# Patient Record
Sex: FEMALE | Race: WHITE | NOT HISPANIC OR LATINO | Employment: FULL TIME | URBAN - METROPOLITAN AREA
[De-identification: names, ages, dates, MRNs, and addresses within clinical notes are randomized per-mention and may not be internally consistent; named-entity substitution may affect disease eponyms.]

---

## 2017-07-05 RX ORDER — NORETHINDRONE ACETATE AND ETHINYL ESTRADIOL 1MG-20(21)
1 KIT ORAL DAILY
Qty: 28 TABLET | Refills: 0 | Status: SHIPPED | OUTPATIENT
Start: 2017-07-05 | End: 2017-08-07 | Stop reason: SDUPTHER

## 2017-08-08 RX ORDER — NORETHINDRONE ACETATE AND ETHINYL ESTRADIOL 1MG-20(21)
1 KIT ORAL DAILY
Qty: 28 TABLET | Refills: 0 | Status: SHIPPED | OUTPATIENT
Start: 2017-08-08 | End: 2017-08-09 | Stop reason: SDUPTHER

## 2017-08-09 ENCOUNTER — OFFICE VISIT (OUTPATIENT)
Dept: OBSTETRICS AND GYNECOLOGY | Facility: CLINIC | Age: 27
End: 2017-08-09
Payer: COMMERCIAL

## 2017-08-09 VITALS — BODY MASS INDEX: 25.32 KG/M2 | HEIGHT: 61 IN | WEIGHT: 134.13 LBS

## 2017-08-09 DIAGNOSIS — Z01.419 ENCOUNTER FOR GYNECOLOGICAL EXAMINATION: Primary | ICD-10-CM

## 2017-08-09 DIAGNOSIS — Z30.41 ENCOUNTER FOR SURVEILLANCE OF CONTRACEPTIVE PILLS: ICD-10-CM

## 2017-08-09 DIAGNOSIS — Z12.4 SCREENING FOR MALIGNANT NEOPLASM OF THE CERVIX: ICD-10-CM

## 2017-08-09 PROCEDURE — 99395 PREV VISIT EST AGE 18-39: CPT | Mod: S$GLB,,, | Performed by: NURSE PRACTITIONER

## 2017-08-09 PROCEDURE — 99999 PR PBB SHADOW E&M-EST. PATIENT-LVL III: CPT | Mod: PBBFAC,,, | Performed by: NURSE PRACTITIONER

## 2017-08-09 RX ORDER — ALPRAZOLAM 0.25 MG/1
0.25 TABLET ORAL DAILY
Refills: 0 | COMMUNITY
Start: 2017-05-18 | End: 2019-06-08

## 2017-08-09 RX ORDER — LISDEXAMFETAMINE DIMESYLATE 30 MG/1
30 CAPSULE ORAL EVERY MORNING
Refills: 0 | COMMUNITY
Start: 2017-07-13 | End: 2018-08-28

## 2017-08-09 RX ORDER — VENLAFAXINE HYDROCHLORIDE 75 MG/1
CAPSULE, EXTENDED RELEASE ORAL
Refills: 1 | COMMUNITY
Start: 2017-07-21 | End: 2018-08-28

## 2017-08-09 RX ORDER — NORETHINDRONE ACETATE AND ETHINYL ESTRADIOL 1MG-20(21)
1 KIT ORAL DAILY
Qty: 28 TABLET | Refills: 11 | Status: SHIPPED | OUTPATIENT
Start: 2017-08-09 | End: 2017-08-09 | Stop reason: CLARIF

## 2017-08-09 RX ORDER — NORETHINDRONE ACETATE AND ETHINYL ESTRADIOL, AND FERROUS FUMARATE 1MG-20(24)
1 KIT ORAL DAILY
Qty: 84 CAPSULE | Refills: 3 | Status: SHIPPED | OUTPATIENT
Start: 2017-08-09 | End: 2018-08-09

## 2017-08-09 NOTE — PROGRESS NOTES
Chief Complaint: Well Woman Exam     HPI:      (Shayna pt)  Last Pap: 2016 (normal)    Tamara Posey is a 27 y.o.  who presents today for well woman exam.  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  She is engaged to be , and will likely have wedding at end of 2018.  She will be without health insurance as of 17 until her new insurance kicks in in 2-3 months - she'd like to know if there's a cheaper alternative or if she can get a few packs at once.  LMP: Patient's last menstrual period was 2017 (exact date).    No issues, problems, or complaints. Specifically, patient denies abnormal vaginal bleeding, discharge, pelvic pain, urinary problems, or changes in appetite.   Ms. Posey is currently sexually active with a single male partner.   She declines STD screening today.      Past Medical History:   Diagnosis Date    ADD (attention deficit disorder)     Anxiety     History of panic attacks      Past Surgical History:   Procedure Laterality Date    LASIK Bilateral 2012    TONSILLECTOMY  2014    WISDOM TOOTH EXTRACTION       Social History     Social History    Marital status: Single     Spouse name: N/A    Number of children: N/A    Years of education: N/A     Occupational History    Not on file.     Social History Main Topics    Smoking status: Never Smoker    Smokeless tobacco: Never Used    Alcohol use Yes      Comment: Occational    Drug use: No    Sexual activity: Yes     Partners: Male     Birth control/ protection: OCP      Comment: Engaged:     Other Topics Concern    Not on file     Social History Narrative    No narrative on file     Family History   Problem Relation Age of Onset    Colon cancer Paternal Grandfather     Cancer Paternal Grandfather     Breast cancer Neg Hx     Ovarian cancer Neg Hx      OB History      Para Term  AB Living    0 0 0 0 0 0    SAB TAB Ectopic Multiple  "Live Births    0 0 0 0          Obstetric Comments    Menarche ~ 15          ROS:     GENERAL: Denies unintentional weight gain or weight loss. Feeling well overall.   SKIN: Denies rash or lesions.   HEENT: Denies headaches, or vision changes.   CARDIOVASCULAR: Denies palpitations or chest pain.   RESPIRATORY: Denies shortness of breath or dyspnea on exertion.  BREASTS: Denies pain, lumps, or nipple discharge.   ABDOMEN: Denies abdominal pain, constipation, diarrhea, nausea, vomiting, change in appetite.  URINARY: Denies frequency, dysuria, hematuria.  NEUROLOGIC: Denies syncope or weakness.   PSYCHIATRIC: Denies depression, anxiety or mood swings.      Physical Exam:      PHYSICAL EXAM:  Ht 5' 1" (1.549 m)   Wt 60.9 kg (134 lb 2.4 oz)   LMP 07/18/2017 (Exact Date)   BMI 25.35 kg/m²   Body mass index is 25.35 kg/m².     APPEARANCE: Well nourished, well developed, in no acute distress.  PSYCH: Appropriate mood and affect.  SKIN: No acne or hirsutism.  NECK: Neck symmetric without masses or thyromegaly  NODES: No inguinal, axillary, or supraclavicular lymph node enlargement  CHEST: Normal respiratory effort.  ABDOMEN: Soft.  No tenderness or masses.    BREASTS: Symmetrical, no skin changes or visible lesions.  No palpable masses or nipple discharge bilaterally.  PELVIC: Normal external genitalia without lesions.  Normal hair distribution.  Adequate perineal body, normal urethral meatus.  Vagina moist and well rugated without lesions or discharge.  Cervix pink, without lesions, discharge or tenderness.  No significant cystocele or rectocele.  Bimanual exam shows uterus to be normal size, regular, mobile and nontender.  Adnexa without masses or tenderness.    EXTREMITIES: No edema.    Assessment/Plan:     Encounter for gynecological examination    Screening for malignant neoplasm of the cervix  -     Cancel: Liquid-based pap smear, screening    Encounter for surveillance of contraceptive pills  -     Discontinue: " norethindrone-ethinyl estradiol (BLISOVI FE 1/20, 28,) 1 mg-20 mcg (21)/75 mg (7) per tablet; Take 1 tablet by mouth once daily. Pt needs appointment for further refills.  Dispense: 28 tablet; Refill: 11  -     TAYTULLA 1 mg-20 mcg (24)/75 mg (4) Cap; Take 1 capsule by mouth once daily. (Dispense 3-packs at a time please)  Dispense: 84 capsule; Refill: 3    * Next pap due 2019      Counseling:     Patient was counseled today on current ASCCP pap guidelines, the recommendation for yearly pelvic exams, healthy diet and exercise routines, annual mammograms and breast self awareness. She is to see her PCP for other health maintenance.     Return in about 1 year (around 8/9/2018) for Annual.

## 2018-08-28 ENCOUNTER — OFFICE VISIT (OUTPATIENT)
Dept: OBSTETRICS AND GYNECOLOGY | Facility: CLINIC | Age: 28
End: 2018-08-28
Payer: COMMERCIAL

## 2018-08-28 VITALS
BODY MASS INDEX: 26.81 KG/M2 | SYSTOLIC BLOOD PRESSURE: 110 MMHG | DIASTOLIC BLOOD PRESSURE: 68 MMHG | HEIGHT: 61 IN | WEIGHT: 142 LBS

## 2018-08-28 DIAGNOSIS — Z01.419 ENCOUNTER FOR GYNECOLOGICAL EXAMINATION: Primary | ICD-10-CM

## 2018-08-28 DIAGNOSIS — Z30.41 ENCOUNTER FOR SURVEILLANCE OF CONTRACEPTIVE PILLS: ICD-10-CM

## 2018-08-28 PROCEDURE — 99395 PREV VISIT EST AGE 18-39: CPT | Mod: S$GLB,,, | Performed by: NURSE PRACTITIONER

## 2018-08-28 PROCEDURE — 99999 PR PBB SHADOW E&M-EST. PATIENT-LVL III: CPT | Mod: PBBFAC,,, | Performed by: NURSE PRACTITIONER

## 2018-08-28 NOTE — PROGRESS NOTES
Chief Complaint: Well Woman Exam     (Dr. montez patient)    Last Pap:  2016 Normal,  HPV n/a    Last Mammo:  n/a    HPI:      Tamara Posey is a 28 y.o.  who presents today for well woman exam.  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  LMP: Patient's last menstrual period was 2018.  Happy with OCP's.  No issues, problems, or complaints. Specifically, patient denies abnormal vaginal bleeding, discharge, pelvic pain, urinary problems, or changes in appetite.   Ms. Posey is currently sexually active with a single male partner.  She declines STD screening today.      Past Medical History:   Diagnosis Date    ADD (attention deficit disorder)     Anxiety     Foot fracture, left     History of panic attacks      Past Surgical History:   Procedure Laterality Date    LASIK Bilateral 2012    TONSILLECTOMY  2014    WISDOM TOOTH EXTRACTION       Social History     Socioeconomic History    Marital status: Single     Spouse name: Not on file    Number of children: Not on file    Years of education: Not on file    Highest education level: Not on file   Social Needs    Financial resource strain: Not on file    Food insecurity - worry: Not on file    Food insecurity - inability: Not on file    Transportation needs - medical: Not on file    Transportation needs - non-medical: Not on file   Occupational History    Not on file   Tobacco Use    Smoking status: Never Smoker    Smokeless tobacco: Never Used   Substance and Sexual Activity    Alcohol use: Yes     Comment: Occational    Drug use: No    Sexual activity: Yes     Partners: Male     Birth control/protection: OCP     Comment: Engaged:   Other Topics Concern    Not on file   Social History Narrative    Not on file     Family History   Problem Relation Age of Onset    Colon cancer Paternal Grandfather     Cancer Paternal Grandfather     Breast cancer Neg Hx     Ovarian cancer Neg Hx   "    OB History      Para Term  AB Living    0 0 0 0 0 0    SAB TAB Ectopic Multiple Live Births    0 0 0 0          Obstetric Comments    Menarche ~ 15          ROS:     GENERAL: Denies unintentional weight gain or weight loss. Feeling well overall.   SKIN: Denies rash or lesions.   HEENT: Denies headaches, or vision changes.   CARDIOVASCULAR: Denies palpitations or chest pain.   RESPIRATORY: Denies shortness of breath or dyspnea on exertion.  BREASTS: The patient performs breast self-examination and denies pain, lumps, or nipple discharge.   ABDOMEN: Denies abdominal pain, constipation, diarrhea, nausea, vomiting, change in appetite.  URINARY: Denies frequency, dysuria, hematuria.  NEUROLOGIC: Denies syncope or weakness.   PSYCHIATRIC: Denies depression, anxiety or mood swings.  VULVAR: No pain, no lesions and no itching.  VAGINAL: No relaxation, no itching, no abnormal bleeding and no lesions.    Physical Exam:     PHYSICAL EXAM:  /68   Ht 5' 1" (1.549 m)   Wt 64.4 kg (141 lb 15.6 oz)   LMP 2018   BMI 26.83 kg/m²   Body mass index is 26.83 kg/m².     APPEARANCE: Well nourished, well developed, in no acute distress.  PSYCH: Appropriate mood and affect.  SKIN: No acne or hirsutism.  NECK: Neck symmetric without masses or thyromegaly  NODES: No inguinal, axillary, or supraclavicular lymph node enlargement  CHEST: Normal respiratory effort.  ABDOMEN: Soft.  No tenderness or masses.    BREASTS: Symmetrical, no skin changes or visible lesions.  No palpable masses or nipple discharge bilaterally.  PELVIC: External genitalia: normal external genitalia, no erythema, no discharge  and urethra: normal appearing urethra with no masses, tenderness or lesions.  Normal hair distribution.  Vagina normal appearing vagina with normal color and discharge, no lesions.  Cervix normal appearing cervix without discharge or lesions, cervical motion tenderness absent.  No significant cystocele or rectocele.  " Bimanual exam shows uterus to be uterus is normal size, shape, consistency and nontender, mobile.  Adnexa normal adnexa and no mass, fullness, tenderness.    EXTREMITIES: No edema.       Assessment/Plan:     Encounter for gynecological examination    Encounter for surveillance of contraceptive pills  - Blisovi refilled (28 tablet, RF#11)    Counseling:     Patient was counseled today on current ASCCP pap guidelines, the recommendation for yearly pelvic exams, healthy diet and exercise routines, annual mammograms starting at age 40, and breast self awareness. She is to see her PCP for other health maintenance.     Use of the [facility] Patient Portal discussed and encouraged during today's visit.     Follow-up in about 1 year (around 8/28/2019) for Annual.

## 2019-06-08 ENCOUNTER — OFFICE VISIT (OUTPATIENT)
Dept: URGENT CARE | Facility: CLINIC | Age: 29
End: 2019-06-08
Payer: COMMERCIAL

## 2019-06-08 VITALS
WEIGHT: 142 LBS | TEMPERATURE: 98 F | HEART RATE: 72 BPM | DIASTOLIC BLOOD PRESSURE: 81 MMHG | BODY MASS INDEX: 26.81 KG/M2 | OXYGEN SATURATION: 99 % | SYSTOLIC BLOOD PRESSURE: 118 MMHG | RESPIRATION RATE: 18 BRPM | HEIGHT: 61 IN

## 2019-06-08 DIAGNOSIS — H01.119 CONTACT DERMATITIS OF EYELID, UNSPECIFIED LATERALITY: Primary | ICD-10-CM

## 2019-06-08 PROCEDURE — 3008F BODY MASS INDEX DOCD: CPT | Mod: CPTII,S$GLB,,

## 2019-06-08 PROCEDURE — 99214 OFFICE O/P EST MOD 30 MIN: CPT | Mod: S$GLB,,,

## 2019-06-08 PROCEDURE — 3008F PR BODY MASS INDEX (BMI) DOCUMENTED: ICD-10-PCS | Mod: CPTII,S$GLB,,

## 2019-06-08 PROCEDURE — 99214 PR OFFICE/OUTPT VISIT, EST, LEVL IV, 30-39 MIN: ICD-10-PCS | Mod: S$GLB,,,

## 2019-06-08 RX ORDER — LOTEPREDNOL ETABONATE 2 MG/ML
1 SUSPENSION/ DROPS OPHTHALMIC 2 TIMES DAILY
Qty: 1 BOTTLE | Refills: 0 | Status: SHIPPED | OUTPATIENT
Start: 2019-06-08 | End: 2019-06-18

## 2019-06-08 RX ORDER — CETIRIZINE HYDROCHLORIDE 10 MG/1
10 TABLET ORAL DAILY
COMMUNITY

## 2019-06-08 RX ORDER — VENLAFAXINE HYDROCHLORIDE 75 MG/1
CAPSULE, EXTENDED RELEASE ORAL
COMMUNITY
Start: 2018-12-18 | End: 2019-09-23 | Stop reason: SDUPTHER

## 2019-06-08 NOTE — PROGRESS NOTES
"Subjective:       Patient ID: Tamara Posey is a 29 y.o. female.    Vitals:  height is 5' 1" (1.549 m) and weight is 64.4 kg (142 lb). Her oral temperature is 98.1 °F (36.7 °C). Her blood pressure is 118/81 and her pulse is 72. Her respiration is 18 and oxygen saturation is 99%.     Chief Complaint: Eye Problem    This is a 29 y.o. female who presents today with a chief complaint of bilateral eye redness, itchy, dryness and feels gritty for 4 days. Using [facility] pink Eye relief.   Has allergic response symptoms; redness; itchy; mild edema;    Eye Problem    Both eyes are affected.This is a new problem. The current episode started in the past 7 days. The problem occurs constantly. The problem has been gradually worsening. There was no injury mechanism. The pain is at a severity of 0/10. The patient is experiencing no pain. She does not wear contacts. Associated symptoms include an eye discharge, eye redness and itching. Pertinent negatives include no blurred vision, double vision, fever, foreign body sensation, nausea, photophobia or vomiting. She has tried eye drops for the symptoms. The treatment provided mild relief.       Constitution: Negative for chills and fever.   HENT: Negative for ear pain, congestion and sinus pain.    Neck: Negative for painful lymph nodes.   Cardiovascular: Negative for chest pain.   Eyes: Positive for eye discharge, eye itching and eye redness. Negative for eye trauma, foreign body in eye, eye pain, photophobia, vision loss, double vision, blurred vision and eyelid swelling.   Gastrointestinal: Negative for nausea and vomiting.   Genitourinary: Negative for dysuria and history of kidney stones.   Skin: Negative for rash.   Allergic/Immunologic: Positive for seasonal allergies. Negative for itching.   Neurological: Negative for dizziness, headaches and altered mental status.   Hematologic/Lymphatic: Negative for swollen lymph nodes.   Psychiatric/Behavioral: Negative for altered mental " status.       Objective:      Physical Exam   Constitutional: She is oriented to person, place, and time. She appears well-developed and well-nourished. No distress.   HENT:   Head: Normocephalic.   Right Ear: External ear normal.   Left Ear: External ear normal.   Mouth/Throat: Oropharynx is clear and moist.   Eyes: Pupils are equal, round, and reactive to light. Right eye exhibits no discharge. Left eye exhibits no discharge. No scleral icterus.   Erythema;not injected; edema; clear discharge;   Neck: Normal range of motion. Neck supple.   Cardiovascular: Normal rate, regular rhythm, normal heart sounds and intact distal pulses.   Pulmonary/Chest: Effort normal and breath sounds normal. No stridor. No respiratory distress. She has no wheezes. She has no rales.   Musculoskeletal: Normal range of motion. She exhibits no edema or deformity.   Neurological: She is alert and oriented to person, place, and time.   Skin: Skin is warm. She is not diaphoretic.   Psychiatric: She has a normal mood and affect. Her behavior is normal. Judgment and thought content normal.       Assessment:       1. Contact dermatitis of eyelid, unspecified laterality        Plan:       Patient Instructions     Seasonal Allergy  Seasonal allergy is also called hay fever. It may occur after a person is exposed to pollens released from grasses, weeds, trees and shrubs. This type of allergy occurs during the spring and summer when the pollen contacts the lining of the nose, eyes, eyelids, sinuses and throat. This causes histamine to be released from the tissues. Histamine causes itching and swelling. This may produce a watery discharge from the eyes or nose. Violent sneezing, nasal congestion, post-nasal drip, itching of the eyes, nose, throat and mouth, scratchy throat, and dry cough may also occur.  Home care  Seasonal allergy cannot be cured, but symptoms can be reduced by these measures:  · Avoid or reduce exposure to the allergen as much as  you can:    ¨ Stay indoors on windy days of pollen season.   ¨ Keep windows and doors closed. Use air conditioning instead in your home and car. This filters the air.  ¨ Change air conditioner filters often.  ¨ Take a shower, wash your hair, and change clothes after being outdoors.  ¨ Put on a NIOSH-rated 95 filter mask when working outdoors. Before going outside, take your allergy medicine as advised by your healthcare provider.  · Decongestant pills and sprays reduce tissue swelling and watery discharge. Overuse of nasal decongestant sprays may make symptoms worse. Do not use these more often than recommended. Sometimes you can experience a rebound effect (symptoms worsen), when stopping them. Talk to your healthcare provider or pharmacist about these medicines before taking them, especially if you have high blood pressure or heart problems.   · Antihistamines block the release of histamine during the allergic response. They work better when taken before symptoms develop. Unless a prescription antihistamine was prescribed, you can take over-the-counter antihistamines that do not cause drowsiness.  Ask your pharmacist for suggestions.  · Steroid nasal sprays or oral steroids may also be prescribed for more severe symptoms. These help to reduce the local inflammation that can add to the allergic response.  · If you have asthma, pollen season may make your asthma symptoms worse. It is important that you use your asthma medicines as directed during this time to prevent or treat attacks. Some persons with asthma have asthma symptoms that get worse when they take antihistamines. This is due to the drying effect on the lungs. If you notice this, stop the antihistamines, drink extra fluids and notify your doctor.  · If you have sinus congestion or drainage, a saline nasal rinse may give relief. A saline nasal rinse lessens the swelling and clears excess mucus. This allows sinuses to drain. Prepackaged kits are sold at  most drug stores. These contain pre-mixed salt packets and an irrigation device.  Follow-up care  Follow up with your healthcare provider or as directed. If you have been referred to a specialist, make an appointment promptly.  When to seek medical advice  Call your healthcare provider for any of the following:  · Facial, ear or sinus pain; colored drainage from the nose  · Headaches  · You have asthma and your asthma symptoms do not respond to the usual doses of your medicine  · Cough with colored sputum (mucus)  · Fever of 100.4°F (38°C) or higher, or as directed by the healthcare provider  Call 911 if any of these occur:  · Trouble breathing or swallowing, wheezing  · Hoarse voice, trouble speaking, or drooling  · Confusion  · Very drowsy or trouble awakening  · Fainting or loss of consciousness  · Rapid heart rate, or weak pulse  · Low blood pressure  · Feeling of doom  · Nausea, vomiting, abdominal pain, diarrhea  · Vomiting blood, or large amounts of blood in stool  · Seizure  · Cold, moist, or pale (blue in color) skin  Date Last Reviewed: 5/1/2017  © 0320-9393 [employer]. [address] [place] [id number]. All rights reserved. This information is not intended as a substitute for professional medical care. Always follow your healthcare professional's instructions.            Contact dermatitis of eyelid, unspecified laterality  -     loteprednol (ALREX) 0.2 % DrpS; Place 1 drop into both eyes 2 (two) times daily. for 10 days  Dispense: 1 Bottle; Refill: 0

## 2019-06-08 NOTE — PATIENT INSTRUCTIONS
Seasonal Allergy  Seasonal allergy is also called hay fever. It may occur after a person is exposed to pollens released from grasses, weeds, trees and shrubs. This type of allergy occurs during the spring and summer when the pollen contacts the lining of the nose, eyes, eyelids, sinuses and throat. This causes histamine to be released from the tissues. Histamine causes itching and swelling. This may produce a watery discharge from the eyes or nose. Violent sneezing, nasal congestion, post-nasal drip, itching of the eyes, nose, throat and mouth, scratchy throat, and dry cough may also occur.  Home care  Seasonal allergy cannot be cured, but symptoms can be reduced by these measures:  · Avoid or reduce exposure to the allergen as much as you can:    ¨ Stay indoors on windy days of pollen season.   ¨ Keep windows and doors closed. Use air conditioning instead in your home and car. This filters the air.  ¨ Change air conditioner filters often.  ¨ Take a shower, wash your hair, and change clothes after being outdoors.  ¨ Put on a NIOSH-rated 95 filter mask when working outdoors. Before going outside, take your allergy medicine as advised by your healthcare provider.  · Decongestant pills and sprays reduce tissue swelling and watery discharge. Overuse of nasal decongestant sprays may make symptoms worse. Do not use these more often than recommended. Sometimes you can experience a rebound effect (symptoms worsen), when stopping them. Talk to your healthcare provider or pharmacist about these medicines before taking them, especially if you have high blood pressure or heart problems.   · Antihistamines block the release of histamine during the allergic response. They work better when taken before symptoms develop. Unless a prescription antihistamine was prescribed, you can take over-the-counter antihistamines that do not cause drowsiness.  Ask your pharmacist for suggestions.  · Steroid nasal sprays or oral steroids may  also be prescribed for more severe symptoms. These help to reduce the local inflammation that can add to the allergic response.  · If you have asthma, pollen season may make your asthma symptoms worse. It is important that you use your asthma medicines as directed during this time to prevent or treat attacks. Some persons with asthma have asthma symptoms that get worse when they take antihistamines. This is due to the drying effect on the lungs. If you notice this, stop the antihistamines, drink extra fluids and notify your doctor.  · If you have sinus congestion or drainage, a saline nasal rinse may give relief. A saline nasal rinse lessens the swelling and clears excess mucus. This allows sinuses to drain. Prepackaged kits are sold at most drug stores. These contain pre-mixed salt packets and an irrigation device.  Follow-up care  Follow up with your healthcare provider or as directed. If you have been referred to a specialist, make an appointment promptly.  When to seek medical advice  Call your healthcare provider for any of the following:  · Facial, ear or sinus pain; colored drainage from the nose  · Headaches  · You have asthma and your asthma symptoms do not respond to the usual doses of your medicine  · Cough with colored sputum (mucus)  · Fever of 100.4°F (38°C) or higher, or as directed by the healthcare provider  Call 911 if any of these occur:  · Trouble breathing or swallowing, wheezing  · Hoarse voice, trouble speaking, or drooling  · Confusion  · Very drowsy or trouble awakening  · Fainting or loss of consciousness  · Rapid heart rate, or weak pulse  · Low blood pressure  · Feeling of doom  · Nausea, vomiting, abdominal pain, diarrhea  · Vomiting blood, or large amounts of blood in stool  · Seizure  · Cold, moist, or pale (blue in color) skin  Date Last Reviewed: 5/1/2017  © 1490-9050 [employer] [address] [place] [address], [place] [address]. All rights reserved. This information is not  intended as a substitute for professional medical care. Always follow your healthcare professional's instructions.

## 2019-06-11 ENCOUNTER — TELEPHONE (OUTPATIENT)
Dept: URGENT CARE | Facility: CLINIC | Age: 29
End: 2019-06-11

## 2019-06-11 NOTE — TELEPHONE ENCOUNTER
I have attempted to contact this patient by phone with a courtesy call from visit on 06/08/2019. Left msg for pt to call back

## 2019-07-19 DIAGNOSIS — Z30.41 ENCOUNTER FOR SURVEILLANCE OF CONTRACEPTIVE PILLS: ICD-10-CM

## 2019-07-23 RX ORDER — NORETHINDRONE ACETATE AND ETHINYL ESTRADIOL 1MG-20(24)
KIT ORAL
Qty: 84 TABLET | Refills: 3 | OUTPATIENT
Start: 2019-07-23

## 2019-09-16 ENCOUNTER — TELEPHONE (OUTPATIENT)
Dept: OBSTETRICS AND GYNECOLOGY | Facility: CLINIC | Age: 29
End: 2019-09-16

## 2019-09-16 NOTE — TELEPHONE ENCOUNTER
Spoke with patient and notified that it is ok for her to have her annual appointment while she is on her cycle.     Pt reschedule to a later date because she would feel more comfortable.

## 2019-09-16 NOTE — TELEPHONE ENCOUNTER
----- Message from Sirena Montesinos sent at 9/16/2019 11:27 AM CDT -----  Contact: self  Pt is calling to see if she need to reschedule her upcoming appointment due to her being on her cycle. Pt stated her cycle is not heavy at the moment and she is in the middle of her cycle. Pt can be reached at [phone].

## 2019-09-23 ENCOUNTER — OFFICE VISIT (OUTPATIENT)
Dept: OBSTETRICS AND GYNECOLOGY | Facility: CLINIC | Age: 29
End: 2019-09-23
Payer: COMMERCIAL

## 2019-09-23 VITALS
DIASTOLIC BLOOD PRESSURE: 82 MMHG | WEIGHT: 132.25 LBS | HEIGHT: 61 IN | SYSTOLIC BLOOD PRESSURE: 112 MMHG | BODY MASS INDEX: 24.97 KG/M2

## 2019-09-23 DIAGNOSIS — Z12.4 CERVICAL CANCER SCREENING: Primary | ICD-10-CM

## 2019-09-23 PROCEDURE — 99395 PREV VISIT EST AGE 18-39: CPT | Mod: S$GLB,,, | Performed by: OBSTETRICS & GYNECOLOGY

## 2019-09-23 PROCEDURE — 99395 PR PREVENTIVE VISIT,EST,18-39: ICD-10-PCS | Mod: S$GLB,,, | Performed by: OBSTETRICS & GYNECOLOGY

## 2019-09-23 PROCEDURE — 88175 CYTOPATH C/V AUTO FLUID REDO: CPT

## 2019-09-23 RX ORDER — VENLAFAXINE HYDROCHLORIDE 75 MG/1
CAPSULE, EXTENDED RELEASE ORAL
Qty: 30 CAPSULE | Refills: 11 | Status: SHIPPED | OUTPATIENT
Start: 2019-09-23 | End: 2019-09-24 | Stop reason: SDUPTHER

## 2019-09-23 NOTE — PROGRESS NOTES
"CC: 30 yo G0 female, here for AE     HPI: Tamara is overall well today. Doing well. Last pap was in 2016, NILM. Engaged. She works for "[employer]. Severo works in [employer] business. Regular cycles on COCS. Happy with pills. Gardisil up to date.     Past Medical History:   Diagnosis Date    ADD (attention deficit disorder)     Anxiety     Foot fracture, left     History of panic attacks        Past Surgical History:   Procedure Laterality Date    LASIK Bilateral     TONSILLECTOMY      WISDOM TOOTH EXTRACTION         OB History        0    Para   0    Term   0       0    AB   0    Living   0       SAB   0    TAB   0    Ectopic   0    Multiple   0    Live Births               Obstetric Comments   Menarche ~ 15             Current Outpatient Medications on File Prior to Visit   Medication Sig Dispense Refill    cetirizine (ZYRTEC) 10 MG tablet Take 10 mg by mouth once daily.      norethindrone-e.estradiol-iron (BLISOVI 24 FE ORAL) Blisovi 24 Fe      venlafaxine (EFFEXOR-XR) 75 MG 24 hr capsule venlafaxine       No current facility-administered medications on file prior to visit.          ROS:  GENERAL: Denies weight gain or weight loss. Feeling well overall.   SKIN: Denies rash or lesions.   HEAD: Denies head injury or headache.   CHEST: Denies chest pain or shortness of breath.   CARDIOVASCULAR: Denies palpitations or left sided chest pain.   ABDOMEN: No abdominal pain  URINARY: No frequency, dysuria, hematuria or burning on urination.  REPRODUCTIVE: See HPI.   HEMATOLOGIC: No easy bruisability or excessive bleeding.   MUSCULOSKELETAL: Denies joint pain or swelling.     Physical Exam:   /82   Ht 5' 1" (1.549 m)   Wt 60 kg (132 lb 4.4 oz)   LMP 2019 (Approximate)   BMI 24.99 kg/m²   General: No distress, well appearing  HEENT: normocephalic, atraumatic   Heart: Regular rate  Lungs: No increased work of breathing  Breasts: Symmetrical, no masses, " discharge or skin retractions.  Abdomen: soft, nontender, no masses  MS: lower extremeties symmetrical, no edema  Pelvic Exam:     GENITALIA: Normal external genitalia, no lesions   URETHRA: normal appearing   Bladder non tender   VAGINA: normal vaginal mucosa, no lesions   CERVIX: no lesions visible, no CMT    UTERUS: small, mobile, non tender   ADNEXA: no adnexal masses, tenderness or fullness  PSYCH: Normal affect, mood appropriate       ASSESSMENT/PLAN: 30 yo G0 here for AE.     1. Pap today. If normal, can repeat in 3 years.   2. Refilled COCs  3. Refilled effexor  4. Gardisil up to date  5. Flu shot through work    RTO in 1 year for [employer]    Saloni Poe MD  Obstetrics and Gynecology  [facility]

## 2019-09-24 RX ORDER — VENLAFAXINE HYDROCHLORIDE 75 MG/1
75 CAPSULE, EXTENDED RELEASE ORAL DAILY
Qty: 30 CAPSULE | Refills: 11 | Status: SHIPPED | OUTPATIENT
Start: 2019-09-24 | End: 2019-12-03 | Stop reason: SDUPTHER

## 2019-12-02 ENCOUNTER — OFFICE VISIT (OUTPATIENT)
Dept: INTERNAL MEDICINE | Facility: CLINIC | Age: 29
End: 2019-12-02
Payer: COMMERCIAL

## 2019-12-02 ENCOUNTER — CLINICAL SUPPORT (OUTPATIENT)
Dept: INTERNAL MEDICINE | Facility: CLINIC | Age: 29
End: 2019-12-02
Payer: COMMERCIAL

## 2019-12-02 VITALS
WEIGHT: 136.69 LBS | BODY MASS INDEX: 25.81 KG/M2 | TEMPERATURE: 99 F | HEIGHT: 61 IN | SYSTOLIC BLOOD PRESSURE: 110 MMHG | DIASTOLIC BLOOD PRESSURE: 70 MMHG | OXYGEN SATURATION: 100 % | HEART RATE: 90 BPM

## 2019-12-02 DIAGNOSIS — Z13.220 SCREENING FOR LIPID DISORDERS: ICD-10-CM

## 2019-12-02 DIAGNOSIS — Z23 NEED FOR DIPHTHERIA-TETANUS-PERTUSSIS (TDAP) VACCINE: ICD-10-CM

## 2019-12-02 DIAGNOSIS — Z00.00 ANNUAL PHYSICAL EXAM: ICD-10-CM

## 2019-12-02 DIAGNOSIS — F41.9 CHRONIC ANXIETY: ICD-10-CM

## 2019-12-02 DIAGNOSIS — Z13.1 SCREENING FOR DIABETES MELLITUS: ICD-10-CM

## 2019-12-02 DIAGNOSIS — Z00.00 ANNUAL PHYSICAL EXAM: Primary | ICD-10-CM

## 2019-12-02 PROBLEM — F90.9 ATTENTION-DEFICIT HYPERACTIVITY DISORDER, UNSPECIFIED TYPE: Status: ACTIVE | Noted: 2018-12-18

## 2019-12-02 LAB
ALBUMIN SERPL BCP-MCNC: 4 G/DL (ref 3.5–5.2)
ALP SERPL-CCNC: 64 U/L (ref 55–135)
ALT SERPL W/O P-5'-P-CCNC: 13 U/L (ref 10–44)
ANION GAP SERPL CALC-SCNC: 9 MMOL/L (ref 8–16)
AST SERPL-CCNC: 17 U/L (ref 10–40)
BASOPHILS # BLD AUTO: 0.08 K/UL (ref 0–0.2)
BASOPHILS NFR BLD: 1 % (ref 0–1.9)
BILIRUB SERPL-MCNC: 0.4 MG/DL (ref 0.1–1)
BUN SERPL-MCNC: 11 MG/DL (ref 6–20)
CALCIUM SERPL-MCNC: 9.5 MG/DL (ref 8.7–10.5)
CHLORIDE SERPL-SCNC: 103 MMOL/L (ref 95–110)
CHOLEST SERPL-MCNC: 195 MG/DL (ref 120–199)
CHOLEST/HDLC SERPL: 3.3 {RATIO} (ref 2–5)
CO2 SERPL-SCNC: 26 MMOL/L (ref 23–29)
CREAT SERPL-MCNC: 0.7 MG/DL (ref 0.5–1.4)
DIFFERENTIAL METHOD: ABNORMAL
EOSINOPHIL # BLD AUTO: 0.5 K/UL (ref 0–0.5)
EOSINOPHIL NFR BLD: 6.3 % (ref 0–8)
ERYTHROCYTE [DISTWIDTH] IN BLOOD BY AUTOMATED COUNT: 12.4 % (ref 11.5–14.5)
EST. GFR  (AFRICAN AMERICAN): >60 ML/MIN/1.73 M^2
EST. GFR  (NON AFRICAN AMERICAN): >60 ML/MIN/1.73 M^2
GLUCOSE SERPL-MCNC: 91 MG/DL (ref 70–110)
HCT VFR BLD AUTO: 44.5 % (ref 37–48.5)
HDLC SERPL-MCNC: 60 MG/DL (ref 40–75)
HDLC SERPL: 30.8 % (ref 20–50)
HGB BLD-MCNC: 14 G/DL (ref 12–16)
IMM GRANULOCYTES # BLD AUTO: 0.02 K/UL (ref 0–0.04)
IMM GRANULOCYTES NFR BLD AUTO: 0.3 % (ref 0–0.5)
LDLC SERPL CALC-MCNC: 119.2 MG/DL (ref 63–159)
LYMPHOCYTES # BLD AUTO: 2.2 K/UL (ref 1–4.8)
LYMPHOCYTES NFR BLD: 27.6 % (ref 18–48)
MCH RBC QN AUTO: 30.9 PG (ref 27–31)
MCHC RBC AUTO-ENTMCNC: 31.5 G/DL (ref 32–36)
MCV RBC AUTO: 98 FL (ref 82–98)
MONOCYTES # BLD AUTO: 0.7 K/UL (ref 0.3–1)
MONOCYTES NFR BLD: 9.1 % (ref 4–15)
NEUTROPHILS # BLD AUTO: 4.5 K/UL (ref 1.8–7.7)
NEUTROPHILS NFR BLD: 55.7 % (ref 38–73)
NONHDLC SERPL-MCNC: 135 MG/DL
NRBC BLD-RTO: 0 /100 WBC
PLATELET # BLD AUTO: 264 K/UL (ref 150–350)
PMV BLD AUTO: 11.1 FL (ref 9.2–12.9)
POTASSIUM SERPL-SCNC: 4.8 MMOL/L (ref 3.5–5.1)
PROT SERPL-MCNC: 7 G/DL (ref 6–8.4)
RBC # BLD AUTO: 4.53 M/UL (ref 4–5.4)
SODIUM SERPL-SCNC: 138 MMOL/L (ref 136–145)
TRIGL SERPL-MCNC: 79 MG/DL (ref 30–150)
WBC # BLD AUTO: 8 K/UL (ref 3.9–12.7)

## 2019-12-02 PROCEDURE — 99999 PR PBB SHADOW E&M-EST. PATIENT-LVL IV: CPT | Mod: PBBFAC,,, | Performed by: FAMILY MEDICINE

## 2019-12-02 PROCEDURE — 90471 TDAP VACCINE GREATER THAN OR EQUAL TO 7YO IM: ICD-10-PCS | Mod: S$GLB,,, | Performed by: FAMILY MEDICINE

## 2019-12-02 PROCEDURE — 90471 IMMUNIZATION ADMIN: CPT | Mod: S$GLB,,, | Performed by: FAMILY MEDICINE

## 2019-12-02 PROCEDURE — 80061 LIPID PANEL: CPT

## 2019-12-02 PROCEDURE — 90715 TDAP VACCINE GREATER THAN OR EQUAL TO 7YO IM: ICD-10-PCS | Mod: S$GLB,,, | Performed by: FAMILY MEDICINE

## 2019-12-02 PROCEDURE — 90715 TDAP VACCINE 7 YRS/> IM: CPT | Mod: S$GLB,,, | Performed by: FAMILY MEDICINE

## 2019-12-02 PROCEDURE — 99385 PREV VISIT NEW AGE 18-39: CPT | Mod: S$GLB,,, | Performed by: FAMILY MEDICINE

## 2019-12-02 PROCEDURE — 80053 COMPREHEN METABOLIC PANEL: CPT

## 2019-12-02 PROCEDURE — 85025 COMPLETE CBC W/AUTO DIFF WBC: CPT

## 2019-12-02 PROCEDURE — 99999 PR PBB SHADOW E&M-EST. PATIENT-LVL IV: ICD-10-PCS | Mod: PBBFAC,,, | Performed by: FAMILY MEDICINE

## 2019-12-02 PROCEDURE — 99385 PR PREVENTIVE VISIT,NEW,18-39: ICD-10-PCS | Mod: S$GLB,,, | Performed by: FAMILY MEDICINE

## 2019-12-02 RX ORDER — HYDROXYZINE HYDROCHLORIDE 10 MG/1
10 TABLET, FILM COATED ORAL 3 TIMES DAILY PRN
Qty: 30 TABLET | Refills: 1 | Status: SHIPPED | OUTPATIENT
Start: 2019-12-02 | End: 2020-02-07

## 2019-12-02 RX ORDER — VENLAFAXINE HYDROCHLORIDE 37.5 MG/1
37.5 CAPSULE, EXTENDED RELEASE ORAL DAILY
Qty: 30 CAPSULE | Refills: 1 | Status: SHIPPED | OUTPATIENT
Start: 2019-12-02 | End: 2019-12-30 | Stop reason: SDUPTHER

## 2019-12-02 NOTE — PATIENT INSTRUCTIONS
Prevention Guidelines, Women Ages 18 to 39  Screening tests and vaccines are an important part of managing your health. Health counseling is essential, too. Below are guidelines for these, for women ages 18 to 39. Talk with your healthcare provider to make sure youre up-to-date on what you need.  Screening Who needs it How often   Alcohol misuse All women in this age group At routine exams   Blood pressure All women in this age group Every 2 years if your blood pressure is less than 120/80 mm Hg; yearly if your systolic blood pressure is 120 to 139 mm Hg, or your diastolic blood pressure reading is 80 to 89 mm Hg   Breast cancer All women in this age group should talk with their healthcare providers about the need for clinical breast exams (CBE)1 Clinical breast exam every 3 years1   Cervical cancer Women ages 21 and older Women between ages 21 and 29 should have a Pap test every 3 years; women between ages 30 and 65 are advised to have a Pap test plus an HPV test every 5 years   Chlamydia Sexually active women ages 24 and younger, and women at increased risk for infection Every 3 years if you're at risk or have symptoms   Depression All women in this age group At routine exams   Diabetes mellitus, type 2 Adults with no symptoms who are overweight or obese and have 1 or more other risk factors for diabetes At least every 3 years   Gonorrhea Sexually active women at increased risk for infection At routine exams   Hepatitis C Anyone at increased risk At routine exams   HIV All women At routine exams   Obesity All women in this age group At routine exams   Syphilis Women at increased risk for infection - talk with your healthcare provider At routine exams   Tuberculosis Women at increased risk for infection - talk with your healthcare provider Ask your healthcare provider   Vision All women in this age group At least 1 complete exam in your 20s, and 2 in your 30s   Vaccine2 Who needs it How often   Chickenpox  (varicella) All women in this age group who have no record of this infection or vaccine 2 doses; the second dose should be given 4 to 8 weeks after the first dose   Hepatitis A Women at increased risk for infection - talk with your healthcare provider 2 doses given at least 6 months apart   Hepatitis B Women at increased risk for infection - talk with your healthcare provider 3 doses over 6 months; second dose should be given 1 month after the first dose; the third dose should be given at least 2 months after the second dose and at least 4 months after the first dose   Haemophilus influenzae Type B (HIB) Women at increased risk for infection - talk with your healthcare provider 1 to 3 doses   Human papillomavirus (HPV) All women in this age group up to age 26 3 doses; the second dose should be given 1 to 2 months after the first dose and the third dose given 6 months after the first dose   Influenza (flu) All women in this age group Once a year   Measles, mumps, rubella (MMR) All women in this age group who have no record of these infections or vaccines 1 or 2 doses   Meningococcal Women at increased risk for infection - talk with your healthcare provider 1 or more doses   Pneumococcal conjugate vaccine (PCV13) and pneumococcal polysaccharide vaccine (PPSV23) Women at increased risk for infection - talk with your healthcare provider PCV13: 1 dose ages 19 to 65 (protects against 13 types of pneumococcal bacteria)  PPSV23: 1 to 2 doses through age 64, or 1 dose at 65 or older (protects against 23 types of pneumococcal bacteria)      Tetanus/diphtheria/pertussis (Td/Tdap) booster All women in this age group Td every 10 years, or a one-time dose of Tdap instead of a Td booster after age 18, then Td every 10 years   Counseling Who needs it How often   BRCA gene mutation testing for breast and ovarian cancer susceptibility Women with increased risk for having gene mutation When your risk is known   Breast cancer and  chemoprevention Women at high risk for breast cancer When your risk is known   Diet and exercise Women who are overweight or obese When diagnosed, and then at routine exams   Domestic violence Women at the age in which they are able to have children At routine exams   Sexually transmitted infection prevention Women who are sexually active At routine exams   Skin cancer Prevention of skin cancer in fair-skinned adults through age 24 At routine exams   Use of tobacco and the health effects it can cause All women in this age group Every visit   1According to the ACS, women ages 20 to 39 years should have a clinical breast exam (CBE) as part of their routine health exam every 3 years. Breast self-exams are an option for women starting in their 20s. But the  USPSTF does not recommend CBE.  2Those who are 18 years old and not up-to-date on their childhood vaccines should get all appropriate catch-up vaccines recommended by the CDC.  Date Last Reviewed: 8/27/2015  © 9432-6209 [employer]. [address] [place] [address], [place] [address]. All rights reserved. This information is not intended as a substitute for professional medical care. Always follow your healthcare professional's instructions.

## 2019-12-02 NOTE — PROGRESS NOTES
[facility]  [facility]/ Internal Medicine  Clinic Note      Subjective:       Patient ID: Tamara Posey is a 29 y.o. female.    Chief Complaint: Headache and Panic Attack    New patient is here today for annual exam.  She has history of headaches, details below, stable and well-controlled with OTC remedies.  She has history of chronic anxiety and panic episodes, described as onset of tremors, dizziness, chest pain, excessive worry, and sense of impending doom.  She has had prior ER workup of these episodes and chest pain, with normal EKGs.  She notes taking Effexor for the chronic anxiety for the last 2 years, after prior trial of Zoloft and Paxil were both ineffective.  She feels the Effexor is becoming less effective, but is tolerating medication well.  Otherwise at baseline health, no additional questions or concerns.  Women's health issues reviewed and up to date, no breast concerns.  Past medical, surgical, family, social history reviewed.  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    Headache    The problem has been unchanged. The pain is located in the right unilateral and parietal region. The pain quality is similar to prior headaches. The quality of the pain is described as pulsating and squeezing. The pain is moderate. Associated symptoms include dizziness, eye watering, photophobia and scalp tenderness. Pertinent negatives include no blurred vision, coughing, facial sweating, fever, nausea, neck pain, numbness, phonophobia, rhinorrhea, sinus pressure, sore throat or vomiting. The symptoms are aggravated by emotional stress. She has tried acetaminophen and NSAIDs for the symptoms. The treatment provided significant relief. There is no history of cluster headaches or migraine headaches.     Review of Systems   Constitutional: Negative for fatigue and fever.   HENT: Negative for rhinorrhea, sinus pressure and sore throat.    Eyes: Positive for photophobia. Negative  "for blurred vision and visual disturbance.   Respiratory: Negative for cough and shortness of breath.    Cardiovascular: Negative for chest pain and palpitations.   Gastrointestinal: Negative for diarrhea, nausea and vomiting.   Genitourinary: Negative for dysuria and menstrual problem.   Musculoskeletal: Negative for neck pain.   Skin: Negative for rash and wound.   Neurological: Positive for dizziness and headaches. Negative for syncope and numbness.   Psychiatric/Behavioral: Negative for dysphoric mood and sleep disturbance. The patient is nervous/anxious.        Objective:      /70 (BP Location: Right arm, Patient Position: Sitting, BP Method: Medium (Manual))   Pulse 90   Temp 98.5 °F (36.9 °C)   Ht 5' 1" (1.549 m)   Wt 62 kg (136 lb 11 oz)   LMP 11/18/2019   SpO2 100%   BMI 25.83 kg/m²   Physical Exam   Constitutional: She is oriented to person, place, and time. She appears well-developed and well-nourished. No distress.   HENT:   Head: Normocephalic and atraumatic.   Right Ear: Tympanic membrane and ear canal normal. Tympanic membrane is not erythematous and not retracted. No middle ear effusion.   Left Ear: Tympanic membrane and ear canal normal. Tympanic membrane is not erythematous and not retracted.  No middle ear effusion.   Nose: Nose normal. No mucosal edema or rhinorrhea.   Mouth/Throat: Oropharynx is clear and moist and mucous membranes are normal. No posterior oropharyngeal edema or posterior oropharyngeal erythema.   Neck: Normal range of motion. No thyromegaly present.   Cardiovascular: Normal rate and regular rhythm.   No murmur heard.  Pulmonary/Chest: Effort normal and breath sounds normal. No respiratory distress. She has no wheezes. She has no rales.   Abdominal: Soft. Bowel sounds are normal. She exhibits no distension. There is no tenderness.   Musculoskeletal: Normal range of motion. She exhibits no edema.   Lymphadenopathy:     She has no cervical adenopathy.   Neurological: " She is alert and oriented to person, place, and time. No cranial nerve deficit or sensory deficit. She exhibits normal muscle tone.   Skin: Skin is warm and dry. No rash noted.   Psychiatric: She has a normal mood and affect.   Vitals reviewed.      Assessment:       1. Annual physical exam    2. Screening for diabetes mellitus    3. Screening for lipid disorders    4. Chronic anxiety        Plan:     1. Annual physical exam  2. Screening for diabetes mellitus  3. Screening for lipid disorders  - health history reviewed, USPSTF preventive health recommendations based on age reviewed and tests ordered below  - - Lipid panel; Future  - - Comprehensive metabolic panel; Future  - routine health maintenance counseling provided and healthy diet/exercise recommendations reviewed  - adult immunization counseling reviewed     4. Chronic anxiety  - history reviewed, with chronic anxiety and worsening symptoms, increasing panic episodes  - - CBC auto differential; Future   - non-pharmacologic treatment measures reviewed, including benefit of routine/structured exercise program and therapy/counseling   - treatment options reviewed, will increase dosing of Effexor to 112.5 mg daily (75+37.5 mg tablets) and monitor response, provide with trial of safe anxiolytic for as-needed use, dosing instructions and potential side effects reviewed  - - venlafaxine (EFFEXOR-XR) 37.5 MG 24 hr capsule; Take 1 capsule (37.5 mg total) by mouth once daily.  Dispense: 30 capsule; Refill: 1  - - hydrOXYzine HCl (ATARAX) 10 MG Tab; Take 1 tablet (10 mg total) by mouth 3 (three) times daily as needed.  Dispense: 30 tablet; Refill: 1  - questions answered, warning signs reviewed, patient is comfortable with this plan and was encouraged to call the office for any concerns or worsening of condition     - Follow up in about 4 weeks (around 12/30/2019) for follow-up medication.     Brayan Mayo MD  12/2/2019          Medication List with  Changes/Refills   New Medications    HYDROXYZINE HCL (ATARAX) 10 MG TAB    Take 1 tablet (10 mg total) by mouth 3 (three) times daily as needed.    VENLAFAXINE (EFFEXOR-XR) 37.5 MG 24 HR CAPSULE    Take 1 capsule (37.5 mg total) by mouth once daily.   Current Medications    CETIRIZINE (ZYRTEC) 10 MG TABLET    Take 10 mg by mouth once daily.    NORETHINDRONE-E.ESTRADIOL-IRON (BLISOVI 24 FE) 1 MG-20 MCG (24)/75 MG (4) ORAL PER TABLET    Take 1 tablet by mouth once daily.    VENLAFAXINE (EFFEXOR-XR) 75 MG 24 HR CAPSULE    Take 1 capsule (75 mg total) by mouth once daily. venlafaxine

## 2019-12-02 NOTE — PROGRESS NOTES
After obtaining consent, and per orders of Dr. Mayo, injection of tdap Lot [id number] 11/13/21 given in the LD by NIDHI TORRES. Patient tolerated well and band aid applied. Patient instructed to remain in clinic for 15 minutes afterwards, and to report any adverse reaction to me immediately.

## 2019-12-03 ENCOUNTER — TELEPHONE (OUTPATIENT)
Dept: INTERNAL MEDICINE | Facility: CLINIC | Age: 29
End: 2019-12-03

## 2019-12-03 ENCOUNTER — TELEPHONE (OUTPATIENT)
Dept: OBSTETRICS AND GYNECOLOGY | Facility: CLINIC | Age: 29
End: 2019-12-03

## 2019-12-03 DIAGNOSIS — F41.9 CHRONIC ANXIETY: Primary | ICD-10-CM

## 2019-12-03 RX ORDER — VENLAFAXINE HYDROCHLORIDE 75 MG/1
75 CAPSULE, EXTENDED RELEASE ORAL DAILY
Qty: 90 CAPSULE | Refills: 3 | Status: SHIPPED | OUTPATIENT
Start: 2019-12-03 | End: 2019-12-30 | Stop reason: SDUPTHER

## 2019-12-03 NOTE — TELEPHONE ENCOUNTER
"[employer] message sent.  Please let the patient know their results, thank you:    " Hi Ms. Posey,    I have reviewed your recent results.  Your lab results are all entirely normal, and there is nothing worrisome.  This includes your blood counts, electrolytes, blood sugar, kidney/liver function, and cholesterol levels.  Keep up the good work and please contact the office if you have any additional questions or concerns.    Brayan Mayo MD      "

## 2019-12-03 NOTE — TELEPHONE ENCOUNTER
----- Message from Urbano Corral sent at 12/3/2019 12:49 PM CST -----  Contact: Self      Can the clinic reply in [facility]:Y        Please refill the medication(s) listed below. Please call the patient when the prescription(s) is ready for  at this phone number Pt is saying that the Pharmacy is saying that Dr. Deepika don is inactive and they cant refill her prescription because of that.Please contact to further discuss and advise.        Medication #1 venlafaxine (EFFEXOR-XR) 75 MG 24 hr capsule    Medication #2       Preferred Pharmacy: [id number] - [place] - [address]

## 2019-12-03 NOTE — TELEPHONE ENCOUNTER
Spoke with patient, she has been informed she will need to visit with PCP to continue to receive medication requested.

## 2019-12-30 ENCOUNTER — OFFICE VISIT (OUTPATIENT)
Dept: INTERNAL MEDICINE | Facility: CLINIC | Age: 29
End: 2019-12-30
Payer: COMMERCIAL

## 2019-12-30 VITALS
DIASTOLIC BLOOD PRESSURE: 80 MMHG | HEIGHT: 61 IN | SYSTOLIC BLOOD PRESSURE: 106 MMHG | BODY MASS INDEX: 25.89 KG/M2 | HEART RATE: 77 BPM | WEIGHT: 137.13 LBS | OXYGEN SATURATION: 99 %

## 2019-12-30 DIAGNOSIS — F41.9 CHRONIC ANXIETY: ICD-10-CM

## 2019-12-30 PROCEDURE — 99213 PR OFFICE/OUTPT VISIT, EST, LEVL III, 20-29 MIN: ICD-10-PCS | Mod: S$GLB,,, | Performed by: FAMILY MEDICINE

## 2019-12-30 PROCEDURE — 3008F PR BODY MASS INDEX (BMI) DOCUMENTED: ICD-10-PCS | Mod: CPTII,S$GLB,, | Performed by: FAMILY MEDICINE

## 2019-12-30 PROCEDURE — 3008F BODY MASS INDEX DOCD: CPT | Mod: CPTII,S$GLB,, | Performed by: FAMILY MEDICINE

## 2019-12-30 PROCEDURE — 99999 PR PBB SHADOW E&M-EST. PATIENT-LVL III: ICD-10-PCS | Mod: PBBFAC,,, | Performed by: FAMILY MEDICINE

## 2019-12-30 PROCEDURE — 99213 OFFICE O/P EST LOW 20 MIN: CPT | Mod: S$GLB,,, | Performed by: FAMILY MEDICINE

## 2019-12-30 PROCEDURE — 99999 PR PBB SHADOW E&M-EST. PATIENT-LVL III: CPT | Mod: PBBFAC,,, | Performed by: FAMILY MEDICINE

## 2019-12-30 RX ORDER — VENLAFAXINE HYDROCHLORIDE 75 MG/1
75 CAPSULE, EXTENDED RELEASE ORAL DAILY
Qty: 90 CAPSULE | Refills: 3 | Status: SHIPPED | OUTPATIENT
Start: 2019-12-30 | End: 2022-02-22

## 2019-12-30 RX ORDER — VENLAFAXINE HYDROCHLORIDE 37.5 MG/1
37.5 CAPSULE, EXTENDED RELEASE ORAL DAILY
Qty: 90 CAPSULE | Refills: 3 | Status: SHIPPED | OUTPATIENT
Start: 2019-12-30 | End: 2021-07-08

## 2019-12-30 NOTE — PROGRESS NOTES
"[facility]  [facility]/ Internal Medicine  Clinic Note      Subjective:       Patient ID: Tamara Posey is a 29 y.o. female.    Chief Complaint: Medication Follow up and 4 week follow up    Patient returns today for follow-up visit.  She was last seen for annual exam, labs obtained and results reviewed today, all normal and reassuring.  She had noted a history of headaches, which continue to be benign in character and responding well to OTC NSAID treatment.  She had history of chronic anxiety and panic episodes, and long-term Effexor which was becoming less effective.  We discussed raising the dose of her Effexor medication, and she notes excellent response to this change.  She is having less anxiety and has had no panic episodes since her last visit.  She was given a trial of hydroxyzine and notes she has used it once, and tolerated this medication well without any undue drowsiness.  She is satisfied with her current medication regimen.    Review of Systems   Constitutional: Negative for fatigue and fever.   HENT: Negative for rhinorrhea, sinus pressure and sore throat.    Eyes: Negative for blurred vision.   Respiratory: Negative for cough and shortness of breath.    Cardiovascular: Negative for chest pain and palpitations.   Gastrointestinal: Negative for diarrhea, nausea and vomiting.   Genitourinary: Negative for dysuria and menstrual problem.   Musculoskeletal: Negative for neck pain.   Skin: Negative for rash and wound.   Neurological: Positive for dizziness and headaches. Negative for syncope and numbness.   Psychiatric/Behavioral: Negative for dysphoric mood and sleep disturbance. The patient is not nervous/anxious.        Objective:      /80 (BP Location: Left arm, Patient Position: Sitting, BP Method: Large (Manual))   Pulse 77   Ht 5' 1" (1.549 m)   Wt 62.2 kg (137 lb 2 oz)   LMP 12/23/2019   SpO2 99%   BMI 25.91 kg/m²   Physical Exam   Constitutional: She is " oriented to person, place, and time. She appears well-developed and well-nourished. No distress.   HENT:   Head: Normocephalic and atraumatic.   Neck: Normal range of motion.   Cardiovascular: Normal rate and regular rhythm.   No murmur heard.  Pulmonary/Chest: Effort normal and breath sounds normal. No respiratory distress. She has no wheezes. She has no rales.   Abdominal: Soft. Bowel sounds are normal. She exhibits no distension. There is no tenderness.   Musculoskeletal: Normal range of motion.   Neurological: She is alert and oriented to person, place, and time.   Skin: Skin is warm and dry. No rash noted.   Psychiatric: She has a normal mood and affect.   Vitals reviewed.      Assessment:       1. Chronic anxiety        Plan:     1. Chronic anxiety  - chronic condition, is now better-controlled on higher dosing of daily Effexor to 112.5 mg, will continue current regimen and refill provided   - - venlafaxine (EFFEXOR-XR) 37.5 MG 24 hr capsule; Take 1 capsule (37.5 mg total) by mouth once daily. Take with 75 mg tablet for total daily dose of 112.5 mg  Dispense: 90 capsule; Refill: 3  - - venlafaxine (EFFEXOR-XR) 75 MG 24 hr capsule; Take 1 capsule (75 mg total) by mouth once daily. Take with 37.5 mg tablet for total daily dose of 112.5 mg  Dispense: 90 capsule; Refill: 3  - non-pharmacologic treatment measures reviewed, including benefit of routine/structured exercise program and therapy/counseling   - OK to use hydroxyzine as needed for anxiety or panic, dosing instructions reviewed  - questions answered, warning signs reviewed, patient is comfortable with this plan and was encouraged to call the office for any concerns or worsening of condition     - Follow up in about 1 year (around 12/30/2020) for annual exam.     Brayan Mayo MD  12/30/2019          Medication List with Changes/Refills   Current Medications    CETIRIZINE (ZYRTEC) 10 MG TABLET    Take 10 mg by mouth once daily.    HYDROXYZINE HCL  (ATARAX) 10 MG TAB    Take 1 tablet (10 mg total) by mouth 3 (three) times daily as needed.    NORETHINDRONE-E.ESTRADIOL-IRON (BLISOVI 24 FE) 1 MG-20 MCG (24)/75 MG (4) ORAL PER TABLET    Take 1 tablet by mouth once daily.   Changed and/or Refilled Medications    Modified Medication Previous Medication    VENLAFAXINE (EFFEXOR-XR) 37.5 MG 24 HR CAPSULE venlafaxine (EFFEXOR-XR) 37.5 MG 24 hr capsule       Take 1 capsule (37.5 mg total) by mouth once daily. Take with 75 mg tablet for total daily dose of 112.5 mg    Take 1 capsule (37.5 mg total) by mouth once daily.    VENLAFAXINE (EFFEXOR-XR) 75 MG 24 HR CAPSULE venlafaxine (EFFEXOR-XR) 75 MG 24 hr capsule       Take 1 capsule (75 mg total) by mouth once daily. Take with 37.5 mg tablet for total daily dose of 112.5 mg    Take 1 capsule (75 mg total) by mouth once daily. venlafaxine

## 2020-02-06 DIAGNOSIS — F41.9 CHRONIC ANXIETY: ICD-10-CM

## 2020-02-07 RX ORDER — HYDROXYZINE HYDROCHLORIDE 10 MG/1
TABLET, FILM COATED ORAL
Qty: 30 TABLET | Refills: 1 | Status: SHIPPED | OUTPATIENT
Start: 2020-02-07 | End: 2021-09-29

## 2020-04-08 ENCOUNTER — PATIENT MESSAGE (OUTPATIENT)
Dept: INTERNAL MEDICINE | Facility: CLINIC | Age: 30
End: 2020-04-08

## 2020-04-21 DIAGNOSIS — Z01.84 ANTIBODY RESPONSE EXAMINATION: ICD-10-CM

## 2020-04-24 ENCOUNTER — OFFICE VISIT (OUTPATIENT)
Dept: INTERNAL MEDICINE | Facility: CLINIC | Age: 30
End: 2020-04-24
Payer: COMMERCIAL

## 2020-04-24 ENCOUNTER — TELEPHONE (OUTPATIENT)
Dept: INTERNAL MEDICINE | Facility: CLINIC | Age: 30
End: 2020-04-24

## 2020-04-24 DIAGNOSIS — M25.552 CHRONIC PAIN OF BOTH HIPS: ICD-10-CM

## 2020-04-24 DIAGNOSIS — G89.29 CHRONIC NECK PAIN: Primary | ICD-10-CM

## 2020-04-24 DIAGNOSIS — M54.2 CHRONIC NECK PAIN: Primary | ICD-10-CM

## 2020-04-24 DIAGNOSIS — G89.29 CHRONIC PAIN OF BOTH HIPS: ICD-10-CM

## 2020-04-24 DIAGNOSIS — M25.551 CHRONIC PAIN OF BOTH HIPS: ICD-10-CM

## 2020-04-24 PROCEDURE — 99213 OFFICE O/P EST LOW 20 MIN: CPT | Mod: 95,,, | Performed by: FAMILY MEDICINE

## 2020-04-24 PROCEDURE — 99213 PR OFFICE/OUTPT VISIT, EST, LEVL III, 20-29 MIN: ICD-10-PCS | Mod: 95,,, | Performed by: FAMILY MEDICINE

## 2020-04-24 NOTE — PROGRESS NOTES
"[facility]  [facility] - Family Medicine/ Internal Medicine  Clinic Note      Subjective:       Patient ID: Tamara Posey is a 29 y.o. female.    Chief Complaint: No chief complaint on file.    Patient calls in for a tele visit.  She notes onset of hip and neck pain 2 months ago, not getting better.  No preceding injury, but has been working at her job for longer periods of time in a seated position.  Topical remedies have been helpful.  No joint swelling, erythema, tenderness, increased warmth, or headache.  No fever or sick symptoms.    Hip Pain    Incident onset: 2 months. There was no injury mechanism. The pain is present in the left hip and right hip. Quality: "radiating and tight" The pain is moderate. Pertinent negatives include no inability to bear weight, muscle weakness, numbness or tingling. The symptoms are aggravated by movement. Treatments tried: OTC topical analgesics are working better than OTC oral ibuprofen.   Neck Pain    This is a new problem. Episode onset: 2 months. The problem occurs constantly. The problem has been unchanged. Quality: "tightness" The pain is moderate. The symptoms are aggravated by bending and position. The pain is same all the time. Pertinent negatives include no fever, headaches, numbness or tingling.     Review of Systems   Constitutional: Negative for fever.   Musculoskeletal: Positive for neck pain.   Neurological: Negative for tingling, numbness and headaches.       Objective:      There were no vitals taken for this visit.  Physical Exam   Constitutional: She is oriented to person, place, and time. She appears well-developed and well-nourished.  Non-toxic appearance. She does not have a sickly appearance. She does not appear ill. No distress.   Limited exam and vitals today due to virtual video visit   Eyes: Conjunctivae are normal.   Pulmonary/Chest: Effort normal. No respiratory distress.   Neurological: She is alert and oriented to person, place, and " time.   Psychiatric: She has a normal mood and affect. Her speech is normal and behavior is normal. Thought content normal.       Assessment:       1. Chronic neck pain    2. Chronic pain of both hips        Plan:     1. Chronic neck pain  2. Chronic pain of both hips  - history and exam findings reviewed, differential reviewed, likely muscle sprain/strain due to ergonomic issues at work, has been working for prolonged periods of time in seated position  - supportive care measures reviewed, will refer to PT for evaluation and treatment when they open back up, have recommended massage, stretching, exercise/activity, handout provided  - - Ambulatory referral/consult to Physical/Occupational Therapy; Future   - treatment options reviewed, OK to continue topical analgesics and will Rx trial of compounded medication from [facility] pharmacy  - - Rx for flurbiprofen-cyclobenzaprine-gabapentin-lidocaine faxed to [facility]  - questions answered, warning signs reviewed, patient is comfortable with this plan and was encouraged to call the office for any concerns or worsening of condition    The patient location is: at home in [place]  The chief complaint leading to consultation is: hip and neck pain  Visit type: audiovisual  Total time spent with patient: 10 minutes  Each patient to whom he or she provides medical services by telemedicine is:  (1) informed of the relationship between the physician and patient and the respective role of any other health care provider with respect to management of the patient; and (2) notified that he or she may decline to receive medical services by telemedicine and may withdraw from such care at any time.      - Follow up in 2-4 weeks as needed, for follow-up hip and neck pain.     Brayan Mayo MD  4/24/2020          Medication List with Changes/Refills   Current Medications    CETIRIZINE (ZYRTEC) 10 MG TABLET    Take 10 mg by mouth once daily.    HYDROXYZINE HCL  (ATARAX) 10 MG TAB    TAKE 1 TABLET BY MOUTH THREE TIMES A DAY AS NEEDED    NORETHINDRONE-E.ESTRADIOL-IRON (BLISOVI 24 FE) 1 MG-20 MCG (24)/75 MG (4) ORAL PER TABLET    Take 1 tablet by mouth once daily.    VENLAFAXINE (EFFEXOR-XR) 37.5 MG 24 HR CAPSULE    Take 1 capsule (37.5 mg total) by mouth once daily. Take with 75 mg tablet for total daily dose of 112.5 mg    VENLAFAXINE (EFFEXOR-XR) 75 MG 24 HR CAPSULE    Take 1 capsule (75 mg total) by mouth once daily. Take with 37.5 mg tablet for total daily dose of 112.5 mg

## 2020-04-24 NOTE — PATIENT INSTRUCTIONS
Muscle Strain in the Extremities  A muscle strain is a stretching and tearing of muscle fibers. This causes pain, especially when you move that muscle. There may also be some swelling and bruising.  Home care  · Keep the hurt area raised to reduce pain and swelling. This is especially important during the first 48 hours.  · Apply an ice pack over the injured area for 15 to 20 minutes every 3 to 6 hours. You should do this for the first 24 to 48 hours. You can make an ice pack by filling a plastic bag that seals at the top with ice cubes and then wrapping it with a thin towel. Be careful not to injure your skin with the ice treatments. Ice should never be applied directly to skin. Continue the use of ice packs for relief of pain and swelling as needed. After 48 hours, apply heat (warm shower or warm bath) for 15 to 20 minutes several times a day, or alternate ice and heat.  · You may use over-the-counter pain medicine to control pain, unless another medicine was prescribed. If you have chronic liver or kidney disease or ever had a stomach ulcer or GI bleeding, talk with your healthcare provider before using these medicines.  · For leg strains: If crutches have been recommended, dont put full weight on the hurt leg until you can do so without pain. You can return to sports when you are able to hop and run on the injured leg without pain.  Follow-up care  Follow up with your healthcare provider, or as advised.  When to seek medical advice  Call your healthcare provider right away if any of these occur:  · The toes of the injured leg become swollen, cold, blue, numb, or tingly  · Pain or swelling increases  Date Last Reviewed: 11/19/2015 © 2000-2017 [employer]. [address] [place] [id number]. All rights reserved. This information is not intended as a substitute for professional medical care. Always follow your healthcare professional's instructions.      Neck Sprain or Strain  A sudden  force that causes turning or bending of the neck can cause sprain or strain. An example would be the force from a car accident. This can stretch or tear muscles called a strain. It can also stretch or tear ligaments called a sprain. Either of these can cause neck pain. Sometimes neck pain occurs after a simple awkward movement. In either case, muscle spasm is commonly present and contributes to the pain.     Unless you had a forceful physical injury (for example, a car accident or fall), X-rays are usually not ordered for the initial evaluation of neck pain. If pain continues and dose not respond to medical treatment, X-rays and other tests may be performed at a later time.  Home care  · You may feel more soreness and spasm the first few days after the injury. Rest until symptoms begin to improve.  · When lying down, use a comfortable pillow or a rolled towel that supports the head and keeps the spine in a neutral position. The position of the head should not be tilted forward or backward.  · Apply an ice pack over the injured area for 15 to 20 minutes every 3 to 6 hours. You should do this for the first 24 to 48 hours. You can make an ice pack by filling a plastic bag that seals at the top with ice cubes and then wrapping it with a thin towel. After 48 hours, apply heat (warm shower or warm bath) for 15 to 20 minutes several times a day, or alternate ice and heat.  · You may use over-the-counter pain medicine to control pain, unless another pain medicine was prescribed. If you have chronic liver or kidney disease or ever had a stomach ulcer or GI bleeding, talk with your healthcare provider before using these medicines.  · If a soft cervical collar was prescribed, it should be worn only for periods of increased pain. It should not be worn for more than 3 hours a day, or for a period longer than 1 to 2 weeks.  Follow-up care  Follow up with your healthcare provider as directed. Physical therapy may be  needed.  Sometimes fractures dont show up on the first X-ray. Bruises and sprains can sometimes hurt as much as a fracture. These injuries can take time to heal completely. If your symptoms dont improve or they get worse, talk with your healthcare provider. You may need a repeat X-ray or other tests. If X-rays were taken, you will be told of any new findings that may affect your care.  Call 911  Call 911 if you have:  · Neck swelling, difficulty or painful swallowing  · Difficulty breathing  · Chest pain  When to seek medical advice  Call your healthcare provider right away if any of these occur:  · Pain becomes worse or spreads into your arms  · Weakness or numbness in one or both arms  Date Last Reviewed: 11/19/2015  © 5328-6065 [employer]. [address] [place] [address], [place] [address]. All rights reserved. This information is not intended as a substitute for professional medical care. Always follow your healthcare professional's instructions.

## 2020-04-24 NOTE — TELEPHONE ENCOUNTER
----- Message from Flora Ziegler sent at 4/24/2020 10:31 AM CDT -----  Contact: ESSIE DACOSTA [[id number]]  Type:  Patient Returning Call    Who Called: ESSIE DACOSTA [[id number]]    Who Left Message for Patient: Rogerio    Does the patient know what this is regarding?: yes    Best Call Back Number: [phone]    Additional Information: Patient declined virtual visit at this time, she states she would rather be seen in person

## 2020-04-24 NOTE — TELEPHONE ENCOUNTER
Spoke to patient. Patient scheduled virtual visit with Dr. Mayo today at 11:00 am for hip and pain and neck pain.

## 2020-05-21 DIAGNOSIS — Z01.84 ANTIBODY RESPONSE EXAMINATION: ICD-10-CM

## 2020-06-20 DIAGNOSIS — Z01.84 ANTIBODY RESPONSE EXAMINATION: ICD-10-CM

## 2020-07-20 DIAGNOSIS — Z01.84 ANTIBODY RESPONSE EXAMINATION: ICD-10-CM

## 2020-08-19 DIAGNOSIS — Z01.84 ANTIBODY RESPONSE EXAMINATION: ICD-10-CM

## 2020-09-18 DIAGNOSIS — Z01.84 ANTIBODY RESPONSE EXAMINATION: ICD-10-CM

## 2020-10-18 DIAGNOSIS — Z01.84 ANTIBODY RESPONSE EXAMINATION: ICD-10-CM

## 2020-11-17 DIAGNOSIS — Z01.84 ANTIBODY RESPONSE EXAMINATION: ICD-10-CM

## 2020-12-09 ENCOUNTER — PATIENT MESSAGE (OUTPATIENT)
Dept: OBSTETRICS AND GYNECOLOGY | Facility: CLINIC | Age: 30
End: 2020-12-09

## 2020-12-10 DIAGNOSIS — Z32.01 POSITIVE PREGNANCY TEST: Primary | ICD-10-CM

## 2020-12-17 ENCOUNTER — OFFICE VISIT (OUTPATIENT)
Dept: DERMATOLOGY | Facility: CLINIC | Age: 30
End: 2020-12-17
Payer: COMMERCIAL

## 2020-12-17 DIAGNOSIS — D22.9 MULTIPLE BENIGN NEVI: Primary | ICD-10-CM

## 2020-12-17 PROCEDURE — 99999 PR PBB SHADOW E&M-EST. PATIENT-LVL III: ICD-10-PCS | Mod: PBBFAC,,, | Performed by: DERMATOLOGY

## 2020-12-17 PROCEDURE — 99202 OFFICE O/P NEW SF 15 MIN: CPT | Mod: S$GLB,,, | Performed by: DERMATOLOGY

## 2020-12-17 PROCEDURE — 99202 PR OFFICE/OUTPT VISIT, NEW, LEVL II, 15-29 MIN: ICD-10-PCS | Mod: S$GLB,,, | Performed by: DERMATOLOGY

## 2020-12-17 PROCEDURE — 99999 PR PBB SHADOW E&M-EST. PATIENT-LVL III: CPT | Mod: PBBFAC,,, | Performed by: DERMATOLOGY

## 2020-12-17 PROCEDURE — 1126F PR PAIN SEVERITY QUANTIFIED, NO PAIN PRESENT: ICD-10-PCS | Mod: S$GLB,,, | Performed by: DERMATOLOGY

## 2020-12-17 PROCEDURE — 1126F AMNT PAIN NOTED NONE PRSNT: CPT | Mod: S$GLB,,, | Performed by: DERMATOLOGY

## 2020-12-17 NOTE — PROGRESS NOTES
Subjective:       Patient ID:  Tamara Aldana is a 30 y.o. female who presents for   Chief Complaint   Patient presents with    Mole     L side of scalp     HPI   New patient. Here today for focused exam of mole at L frontal hairline; present x ~12 years, asymptomatic (denies pain, pruritus, bleeding), seems stable to patient but her father thinks it may have grown. No personal or known family hx skin cancer.          Review of Systems   Constitutional: Negative for malaise.   Skin: Positive for daily sunscreen use and activity-related sunscreen use. Negative for recent sunburn and wears hat.   Hematologic/Lymphatic: Does not bruise/bleed easily.        Objective:    Physical Exam   Constitutional: She appears well-developed and well-nourished. No distress.   Neurological: She is alert and oriented to person, place, and time. She is not disoriented.   Psychiatric: She has a normal mood and affect.   Skin:   Areas Examined (abnormalities noted in diagram):   Scalp / Hair Palpated and Inspected  Head / Face Inspection Performed              Diagram Legend     Erythematous scaling macule/papule c/w actinic keratosis       Vascular papule c/w angioma      Pigmented verrucoid papule/plaque c/w seborrheic keratosis      Yellow umbilicated papule c/w sebaceous hyperplasia      Irregularly shaped tan macule c/w lentigo     1-2 mm smooth white papules consistent with Milia      Movable subcutaneous cyst with punctum c/w epidermal inclusion cyst      Subcutaneous movable cyst c/w pilar cyst      Firm pink to brown papule c/w dermatofibroma      Pedunculated fleshy papule(s) c/w skin tag(s)      Evenly pigmented macule c/w junctional nevus     Mildly variegated pigmented, slightly irregular-bordered macule c/w mildly atypical nevus      Flesh colored to evenly pigmented papule c/w intradermal nevus       Pink pearly papule/plaque c/w basal cell carcinoma      Erythematous hyperkeratotic cursted plaque c/w SCC       Surgical scar with no sign of skin cancer recurrence      Open and closed comedones      Inflammatory papules and pustules      Verrucoid papule consistent consistent with wart     Erythematous eczematous patches and plaques     Dystrophic onycholytic nail with subungual debris c/w onychomycosis     Umbilicated papule    Erythematous-base heme-crusted tan verrucoid plaque consistent with inflamed seborrheic keratosis     Erythematous Silvery Scaling Plaque c/w Psoriasis     See annotation      Assessment / Plan:        Multiple benign nevi  - Discussed diagnosis, etiology, and benign-nature of condition.  - Reassured; no lesions suspicious for malignancy noted on exam today, especially considering scalp as special site.   - Recommended daily sun protection and routine self examination of skin. Discussed the ABCDEs of melanoma and ugly duckling sign.            Follow up in about 3 months (around 3/17/2021) for TBSE.

## 2020-12-18 NOTE — PATIENT INSTRUCTIONS
What Are the Symptoms of Skin Cancer?  A change in your skin is the most common sign of skin cancer. This could be a new growth, a sore that doesnt heal, or a change in a mole. Not all skin cancers look the same.    For melanoma specifically, a simple way to remember the warning signs is to remember the A-B-C-D-Es of melanoma--    A stands for asymmetrical. Does the mole or spot have an irregular shape with two parts that look very different?  B stands for border. Is the border irregular or jagged?  C is for color. Is the color uneven?  D is for diameter. Is the mole or spot larger than the size of a pea?  E is for evolving. Has the mole or spot changed during the past few weeks or months?    Talk to your doctor if you notice changes in your skin such as a new growth, a sore that doesnt heal, a change in an old growth, or any of the A-B-C-D-Es of melanoma    What Can I Do to Reduce My Risk of Skin Cancer?  Protection from ultraviolet (UV) radiation is important all year, not just during the summer or at the beach. UV rays from the sun can reach you on cloudy and hazy days, not just on bright and jenn days. UV rays also reflect off of surfaces like water, cement, sand, and snow. Indoor tanning (using a tanning bed, pena, or sunlamp to get tan) exposes users to UV radiation.    The hours between 10 a.m. and 4 p.m. Daylight Saving Time (9 a.m. to 3 p.m. standard time) are the most hazardous for UV exposure outdoors in the continental [place]. UV rays from sunlight are the greatest during the late spring and early summer in [place].    CDC recommends easy options for protection from UV radiation--    Stay in the shade or indoors, especially during midday hours.  Wear clothing that covers your arms and legs.  Wear a hat with a wide brim to shade your face, head, ears, and neck.  Wear sunglasses that wrap around and block both UVA and UVB rays.  Use sunscreen with a sun protection factor (SPF) of  30 or higher, and both UVA and UVB (broad spectrum) protection.  Avoid indoor tanning.    Adapted from https://www.cdc.gov/cancer/skin/basic_info/

## 2021-01-07 ENCOUNTER — PROCEDURE VISIT (OUTPATIENT)
Dept: OBSTETRICS AND GYNECOLOGY | Facility: CLINIC | Age: 31
End: 2021-01-07
Payer: COMMERCIAL

## 2021-01-07 ENCOUNTER — LAB VISIT (OUTPATIENT)
Dept: LAB | Facility: OTHER | Age: 31
End: 2021-01-07
Attending: OBSTETRICS & GYNECOLOGY
Payer: COMMERCIAL

## 2021-01-07 ENCOUNTER — OFFICE VISIT (OUTPATIENT)
Dept: OBSTETRICS AND GYNECOLOGY | Facility: CLINIC | Age: 31
End: 2021-01-07
Payer: COMMERCIAL

## 2021-01-07 VITALS
DIASTOLIC BLOOD PRESSURE: 76 MMHG | BODY MASS INDEX: 27.66 KG/M2 | SYSTOLIC BLOOD PRESSURE: 118 MMHG | WEIGHT: 146.38 LBS

## 2021-01-07 DIAGNOSIS — Z36.89 ESTABLISH GESTATIONAL AGE, ULTRASOUND: ICD-10-CM

## 2021-01-07 DIAGNOSIS — Z32.01 POSITIVE PREGNANCY TEST: ICD-10-CM

## 2021-01-07 DIAGNOSIS — N91.2 AMENORRHEA: ICD-10-CM

## 2021-01-07 DIAGNOSIS — N91.2 AMENORRHEA: Primary | ICD-10-CM

## 2021-01-07 LAB
ABO + RH BLD: NORMAL
B-HCG UR QL: POSITIVE
BLD GP AB SCN CELLS X3 SERPL QL: NORMAL
CTP QC/QA: YES

## 2021-01-07 PROCEDURE — 99214 OFFICE O/P EST MOD 30 MIN: CPT | Mod: S$GLB,,, | Performed by: OBSTETRICS & GYNECOLOGY

## 2021-01-07 PROCEDURE — 99214 PR OFFICE/OUTPT VISIT, EST, LEVL IV, 30-39 MIN: ICD-10-PCS | Mod: S$GLB,,, | Performed by: OBSTETRICS & GYNECOLOGY

## 2021-01-07 PROCEDURE — 3008F BODY MASS INDEX DOCD: CPT | Mod: CPTII,S$GLB,, | Performed by: OBSTETRICS & GYNECOLOGY

## 2021-01-07 PROCEDURE — 1126F AMNT PAIN NOTED NONE PRSNT: CPT | Mod: S$GLB,,, | Performed by: OBSTETRICS & GYNECOLOGY

## 2021-01-07 PROCEDURE — 86787 VARICELLA-ZOSTER ANTIBODY: CPT

## 2021-01-07 PROCEDURE — 76801 OB US < 14 WKS SINGLE FETUS: CPT | Mod: S$GLB,,, | Performed by: OBSTETRICS & GYNECOLOGY

## 2021-01-07 PROCEDURE — 86645 CMV ANTIBODY IGM: CPT

## 2021-01-07 PROCEDURE — 86762 RUBELLA ANTIBODY: CPT

## 2021-01-07 PROCEDURE — 86644 CMV ANTIBODY: CPT

## 2021-01-07 PROCEDURE — 86592 SYPHILIS TEST NON-TREP QUAL: CPT

## 2021-01-07 PROCEDURE — 3008F PR BODY MASS INDEX (BMI) DOCUMENTED: ICD-10-PCS | Mod: CPTII,S$GLB,, | Performed by: OBSTETRICS & GYNECOLOGY

## 2021-01-07 PROCEDURE — 83021 HEMOGLOBIN CHROMOTOGRAPHY: CPT

## 2021-01-07 PROCEDURE — 1126F PR PAIN SEVERITY QUANTIFIED, NO PAIN PRESENT: ICD-10-PCS | Mod: S$GLB,,, | Performed by: OBSTETRICS & GYNECOLOGY

## 2021-01-07 PROCEDURE — 76801 PR US, OB <14WKS, TRANSABD, SINGLE GESTATION: ICD-10-PCS | Mod: S$GLB,,, | Performed by: OBSTETRICS & GYNECOLOGY

## 2021-01-07 PROCEDURE — 86703 HIV-1/HIV-2 1 RESULT ANTBDY: CPT

## 2021-01-07 PROCEDURE — 87086 URINE CULTURE/COLONY COUNT: CPT

## 2021-01-07 PROCEDURE — 99999 PR PBB SHADOW E&M-EST. PATIENT-LVL III: ICD-10-PCS | Mod: PBBFAC,,, | Performed by: OBSTETRICS & GYNECOLOGY

## 2021-01-07 PROCEDURE — 99999 PR PBB SHADOW E&M-EST. PATIENT-LVL III: CPT | Mod: PBBFAC,,, | Performed by: OBSTETRICS & GYNECOLOGY

## 2021-01-07 PROCEDURE — 86900 BLOOD TYPING SEROLOGIC ABO: CPT

## 2021-01-07 PROCEDURE — 87340 HEPATITIS B SURFACE AG IA: CPT

## 2021-01-07 RX ORDER — DOXYLAMINE SUCCINATE AND PYRIDOXINE HYDROCHLORIDE, DELAYED RELEASE TABLETS 10 MG/10 MG 10; 10 MG/1; MG/1
2 TABLET, DELAYED RELEASE ORAL NIGHTLY PRN
Qty: 30 TABLET | Refills: 1 | Status: SHIPPED | OUTPATIENT
Start: 2021-01-07 | End: 2021-09-29

## 2021-01-08 ENCOUNTER — PATIENT MESSAGE (OUTPATIENT)
Dept: OBSTETRICS AND GYNECOLOGY | Facility: CLINIC | Age: 31
End: 2021-01-08

## 2021-01-08 LAB
CMV IGG SERPL QL IA: REACTIVE
HBV SURFACE AG SERPL QL IA: NEGATIVE
HGB A2 MFR BLD HPLC: 2.7 % (ref 2.2–3.2)
HGB FRACT BLD ELPH-IMP: NORMAL
HGB FRACT BLD ELPH-IMP: NORMAL
HIV 1+2 AB+HIV1 P24 AG SERPL QL IA: NEGATIVE
RPR SER QL: NORMAL
RUBV IGG SER-ACNC: 20.5 IU/ML
RUBV IGG SER-IMP: REACTIVE

## 2021-01-09 LAB
BACTERIA UR CULT: NO GROWTH
VARICELLA INTERPRETATION: POSITIVE
VARICELLA ZOSTER IGG: 2.32 ISR (ref 0–0.9)

## 2021-01-11 ENCOUNTER — IMMUNIZATION (OUTPATIENT)
Dept: INTERNAL MEDICINE | Facility: CLINIC | Age: 31
End: 2021-01-11
Payer: COMMERCIAL

## 2021-01-11 DIAGNOSIS — Z23 NEED FOR VACCINATION: ICD-10-CM

## 2021-01-11 LAB — CMV IGM SERPL IA-ACNC: <8 AU/ML

## 2021-01-11 PROCEDURE — 91300 COVID-19, MRNA, LNP-S, PF, 30 MCG/0.3 ML DOSE VACCINE: CPT | Mod: PBBFAC | Performed by: STUDENT IN AN ORGANIZED HEALTH CARE EDUCATION/TRAINING PROGRAM

## 2021-01-13 ENCOUNTER — PATIENT MESSAGE (OUTPATIENT)
Dept: OBSTETRICS AND GYNECOLOGY | Facility: CLINIC | Age: 31
End: 2021-01-13

## 2021-01-14 ENCOUNTER — TELEPHONE (OUTPATIENT)
Dept: OBSTETRICS AND GYNECOLOGY | Facility: CLINIC | Age: 31
End: 2021-01-14

## 2021-01-30 ENCOUNTER — PATIENT MESSAGE (OUTPATIENT)
Dept: OBSTETRICS AND GYNECOLOGY | Facility: CLINIC | Age: 31
End: 2021-01-30

## 2021-01-31 DIAGNOSIS — R52 BODY ACHES: ICD-10-CM

## 2021-01-31 DIAGNOSIS — R50.9 FEVER, UNSPECIFIED FEVER CAUSE: ICD-10-CM

## 2021-01-31 DIAGNOSIS — R05.9 COUGH: Primary | ICD-10-CM

## 2021-01-31 DIAGNOSIS — R68.89 CONGESTION OF THROAT: ICD-10-CM

## 2021-01-31 DIAGNOSIS — R51.9 NONINTRACTABLE HEADACHE, UNSPECIFIED CHRONICITY PATTERN, UNSPECIFIED HEADACHE TYPE: ICD-10-CM

## 2021-01-31 DIAGNOSIS — R53.83 FATIGUE, UNSPECIFIED TYPE: ICD-10-CM

## 2021-02-01 ENCOUNTER — TELEPHONE (OUTPATIENT)
Dept: PRIMARY CARE CLINIC | Facility: CLINIC | Age: 31
End: 2021-02-01

## 2021-02-01 ENCOUNTER — LAB VISIT (OUTPATIENT)
Dept: INTERNAL MEDICINE | Facility: CLINIC | Age: 31
End: 2021-02-01

## 2021-02-01 ENCOUNTER — IMMUNIZATION (OUTPATIENT)
Dept: INTERNAL MEDICINE | Facility: CLINIC | Age: 31
End: 2021-02-01
Payer: COMMERCIAL

## 2021-02-01 DIAGNOSIS — R52 BODY ACHES: ICD-10-CM

## 2021-02-01 DIAGNOSIS — R51.9 NONINTRACTABLE HEADACHE, UNSPECIFIED CHRONICITY PATTERN, UNSPECIFIED HEADACHE TYPE: ICD-10-CM

## 2021-02-01 DIAGNOSIS — Z23 NEED FOR VACCINATION: Primary | ICD-10-CM

## 2021-02-01 DIAGNOSIS — R05.9 COUGH: ICD-10-CM

## 2021-02-01 DIAGNOSIS — R50.9 FEVER, UNSPECIFIED FEVER CAUSE: ICD-10-CM

## 2021-02-01 DIAGNOSIS — R53.83 FATIGUE, UNSPECIFIED TYPE: ICD-10-CM

## 2021-02-01 DIAGNOSIS — R68.89 CONGESTION OF THROAT: ICD-10-CM

## 2021-02-01 LAB
CTP QC/QA: YES
SARS-COV-2 RDRP RESP QL NAA+PROBE: NEGATIVE

## 2021-02-01 PROCEDURE — 91300 PR SARS-COV- 2 COVID-19 VACCINE, NO PRSV, 30MCG/0.3ML, IM: CPT | Mod: PBBFAC | Performed by: NURSE PRACTITIONER

## 2021-02-01 PROCEDURE — 0002A PR IMMUNIZ ADMIN, SARS-COV-2 COVID-19 VACC, 30MCG/0.3ML, 2ND DOSE: CPT | Mod: PBBFAC | Performed by: NURSE PRACTITIONER

## 2021-02-01 PROCEDURE — U0002 COVID-19 LAB TEST NON-CDC: HCPCS | Mod: QW,S$GLB,, | Performed by: PREVENTIVE MEDICINE

## 2021-02-01 PROCEDURE — U0002: ICD-10-PCS | Mod: QW,S$GLB,, | Performed by: PREVENTIVE MEDICINE

## 2021-02-01 RX ADMIN — RNA INGREDIENT BNT-162B2 0.3 ML: 0.23 INJECTION, SUSPENSION INTRAMUSCULAR at 10:02

## 2021-02-03 ENCOUNTER — PATIENT MESSAGE (OUTPATIENT)
Dept: ADMINISTRATIVE | Facility: OTHER | Age: 31
End: 2021-02-03

## 2021-02-03 ENCOUNTER — ROUTINE PRENATAL (OUTPATIENT)
Dept: OBSTETRICS AND GYNECOLOGY | Facility: CLINIC | Age: 31
End: 2021-02-03
Payer: COMMERCIAL

## 2021-02-03 VITALS
WEIGHT: 151.25 LBS | DIASTOLIC BLOOD PRESSURE: 60 MMHG | BODY MASS INDEX: 28.58 KG/M2 | SYSTOLIC BLOOD PRESSURE: 120 MMHG

## 2021-02-03 DIAGNOSIS — Z3A.11 11 WEEKS GESTATION OF PREGNANCY: Primary | ICD-10-CM

## 2021-02-03 PROCEDURE — 0500F PR INITIAL PRENATAL CARE VISIT: ICD-10-PCS | Mod: S$GLB,,, | Performed by: NURSE PRACTITIONER

## 2021-02-03 PROCEDURE — 99999 PR PBB SHADOW E&M-EST. PATIENT-LVL III: CPT | Mod: PBBFAC,,, | Performed by: NURSE PRACTITIONER

## 2021-02-03 PROCEDURE — 99999 PR PBB SHADOW E&M-EST. PATIENT-LVL III: ICD-10-PCS | Mod: PBBFAC,,, | Performed by: NURSE PRACTITIONER

## 2021-02-03 PROCEDURE — 0500F INITIAL PRENATAL CARE VISIT: CPT | Mod: S$GLB,,, | Performed by: NURSE PRACTITIONER

## 2021-02-09 ENCOUNTER — PROCEDURE VISIT (OUTPATIENT)
Dept: OBSTETRICS AND GYNECOLOGY | Facility: CLINIC | Age: 31
End: 2021-02-09
Payer: COMMERCIAL

## 2021-02-09 ENCOUNTER — LAB VISIT (OUTPATIENT)
Dept: LAB | Facility: OTHER | Age: 31
End: 2021-02-09
Attending: OBSTETRICS & GYNECOLOGY
Payer: COMMERCIAL

## 2021-02-09 DIAGNOSIS — Z36.89 ENCOUNTER FOR FETAL ANATOMIC SURVEY: ICD-10-CM

## 2021-02-09 DIAGNOSIS — Z36.9 ENCOUNTER FOR ANTENATAL SCREENING: Primary | ICD-10-CM

## 2021-02-09 DIAGNOSIS — Z3A.11 11 WEEKS GESTATION OF PREGNANCY: ICD-10-CM

## 2021-02-09 DIAGNOSIS — Z36.9 ENCOUNTER FOR ANTENATAL SCREENING: ICD-10-CM

## 2021-02-09 DIAGNOSIS — Z36.82 ENCOUNTER FOR (NT) NUCHAL TRANSLUCENCY SCAN: ICD-10-CM

## 2021-02-09 PROCEDURE — 36415 COLL VENOUS BLD VENIPUNCTURE: CPT

## 2021-02-09 PROCEDURE — 81508 FTL CGEN ABNOR TWO PROTEINS: CPT

## 2021-02-09 PROCEDURE — 76813 PR US, OB NUCHAL, TRANSABDOM/TRANSVAG, FIRST GESTATION: ICD-10-PCS | Mod: S$GLB,,, | Performed by: OBSTETRICS & GYNECOLOGY

## 2021-02-09 PROCEDURE — 76813 OB US NUCHAL MEAS 1 GEST: CPT | Mod: S$GLB,,, | Performed by: OBSTETRICS & GYNECOLOGY

## 2021-02-12 LAB
# FETUSES US: NORMAL
AGE AT DELIVERY: 30
B-HCG MOM SERPL: NORMAL
B-HCG SERPL-ACNC: 101.1 IU/ML
FET CRL US.MEAS: 59.8 MM
FET NASAL BONE LENGTH US.MEAS: NORMAL MM
FET NUCHAL FOLD MOM THICKNESS US.MEAS: NORMAL
FET NUCHAL FOLD THICKNESS US.MEAS: 1.4 MM
FET TS 21 RISK FROM MAT AGE: NORMAL
GA (DAYS): 3 D
GA (WEEKS): 12 WK
IDDM PATIENT QL: NORMAL
INTEGRATED SCN PATIENT-IMP NAR: NORMAL
INTEGRATED SCN PATIENT-IMP: NEGATIVE
PAPP-A MOM SERPL: NORMAL
PAPP-A SERPL-MCNC: 929.6 NG/ML
SMOKING STATUS FTND: NO
TS 18 RISK FETUS: NORMAL
TS 21 RISK FETUS: NORMAL
US DATE: NORMAL

## 2021-02-18 ENCOUNTER — PATIENT MESSAGE (OUTPATIENT)
Dept: OBSTETRICS AND GYNECOLOGY | Facility: CLINIC | Age: 31
End: 2021-02-18

## 2021-03-04 ENCOUNTER — ROUTINE PRENATAL (OUTPATIENT)
Dept: OBSTETRICS AND GYNECOLOGY | Facility: CLINIC | Age: 31
End: 2021-03-04
Payer: COMMERCIAL

## 2021-03-04 ENCOUNTER — PATIENT MESSAGE (OUTPATIENT)
Dept: OBSTETRICS AND GYNECOLOGY | Facility: CLINIC | Age: 31
End: 2021-03-04

## 2021-03-04 VITALS
WEIGHT: 149.69 LBS | BODY MASS INDEX: 28.28 KG/M2 | DIASTOLIC BLOOD PRESSURE: 62 MMHG | SYSTOLIC BLOOD PRESSURE: 112 MMHG

## 2021-03-04 DIAGNOSIS — Z34.02 SUPERVISION OF NORMAL FIRST PREGNANCY IN SECOND TRIMESTER: Primary | ICD-10-CM

## 2021-03-04 PROCEDURE — 99999 PR PBB SHADOW E&M-EST. PATIENT-LVL III: CPT | Mod: PBBFAC,,, | Performed by: OBSTETRICS & GYNECOLOGY

## 2021-03-04 PROCEDURE — 99999 PR PBB SHADOW E&M-EST. PATIENT-LVL III: ICD-10-PCS | Mod: PBBFAC,,, | Performed by: OBSTETRICS & GYNECOLOGY

## 2021-03-04 PROCEDURE — 0502F PR SUBSEQUENT PRENATAL CARE: ICD-10-PCS | Mod: CPTII,S$GLB,, | Performed by: OBSTETRICS & GYNECOLOGY

## 2021-03-04 PROCEDURE — 0502F SUBSEQUENT PRENATAL CARE: CPT | Mod: CPTII,S$GLB,, | Performed by: OBSTETRICS & GYNECOLOGY

## 2021-03-12 ENCOUNTER — PATIENT MESSAGE (OUTPATIENT)
Dept: OBSTETRICS AND GYNECOLOGY | Facility: CLINIC | Age: 31
End: 2021-03-12

## 2021-03-12 RX ORDER — ONDANSETRON 4 MG/1
4 TABLET, ORALLY DISINTEGRATING ORAL EVERY 6 HOURS PRN
Qty: 30 TABLET | Refills: 1 | Status: SHIPPED | OUTPATIENT
Start: 2021-03-12 | End: 2021-09-29

## 2021-03-16 ENCOUNTER — LAB VISIT (OUTPATIENT)
Dept: LAB | Facility: HOSPITAL | Age: 31
End: 2021-03-16
Attending: OBSTETRICS & GYNECOLOGY
Payer: COMMERCIAL

## 2021-03-16 ENCOUNTER — PATIENT MESSAGE (OUTPATIENT)
Dept: OBSTETRICS AND GYNECOLOGY | Facility: CLINIC | Age: 31
End: 2021-03-16

## 2021-03-16 DIAGNOSIS — Z34.02 SUPERVISION OF NORMAL FIRST PREGNANCY IN SECOND TRIMESTER: ICD-10-CM

## 2021-03-16 DIAGNOSIS — N91.2 AMENORRHEA: ICD-10-CM

## 2021-03-16 LAB
BASOPHILS # BLD AUTO: 0.05 K/UL (ref 0–0.2)
BASOPHILS NFR BLD: 0.6 % (ref 0–1.9)
DIFFERENTIAL METHOD: ABNORMAL
EOSINOPHIL # BLD AUTO: 0.4 K/UL (ref 0–0.5)
EOSINOPHIL NFR BLD: 4.7 % (ref 0–8)
ERYTHROCYTE [DISTWIDTH] IN BLOOD BY AUTOMATED COUNT: 13.2 % (ref 11.5–14.5)
ESTIMATED AVG GLUCOSE: 91 MG/DL (ref 68–131)
HBA1C MFR BLD: 4.8 % (ref 4–5.6)
HCT VFR BLD AUTO: 37.2 % (ref 37–48.5)
HGB BLD-MCNC: 12.6 G/DL (ref 12–16)
IMM GRANULOCYTES # BLD AUTO: 0.03 K/UL (ref 0–0.04)
IMM GRANULOCYTES NFR BLD AUTO: 0.3 % (ref 0–0.5)
LYMPHOCYTES # BLD AUTO: 1.8 K/UL (ref 1–4.8)
LYMPHOCYTES NFR BLD: 19.7 % (ref 18–48)
MCH RBC QN AUTO: 32.1 PG (ref 27–31)
MCHC RBC AUTO-ENTMCNC: 33.9 G/DL (ref 32–36)
MCV RBC AUTO: 95 FL (ref 82–98)
MONOCYTES # BLD AUTO: 0.6 K/UL (ref 0.3–1)
MONOCYTES NFR BLD: 6.5 % (ref 4–15)
NEUTROPHILS # BLD AUTO: 6.1 K/UL (ref 1.8–7.7)
NEUTROPHILS NFR BLD: 68.2 % (ref 38–73)
NRBC BLD-RTO: 0 /100 WBC
PLATELET # BLD AUTO: 263 K/UL (ref 150–350)
PMV BLD AUTO: 10 FL (ref 9.2–12.9)
RBC # BLD AUTO: 3.93 M/UL (ref 4–5.4)
WBC # BLD AUTO: 8.89 K/UL (ref 3.9–12.7)

## 2021-03-16 PROCEDURE — 85025 COMPLETE CBC W/AUTO DIFF WBC: CPT | Performed by: OBSTETRICS & GYNECOLOGY

## 2021-03-16 PROCEDURE — 36415 COLL VENOUS BLD VENIPUNCTURE: CPT | Performed by: OBSTETRICS & GYNECOLOGY

## 2021-03-16 PROCEDURE — 83036 HEMOGLOBIN GLYCOSYLATED A1C: CPT | Performed by: OBSTETRICS & GYNECOLOGY

## 2021-03-16 PROCEDURE — 81511 FTL CGEN ABNOR FOUR ANAL: CPT | Performed by: OBSTETRICS & GYNECOLOGY

## 2021-03-18 ENCOUNTER — PATIENT MESSAGE (OUTPATIENT)
Dept: RESEARCH | Facility: HOSPITAL | Age: 31
End: 2021-03-18

## 2021-03-19 LAB
# FETUSES US: NORMAL
AFP MOM SERPL: 1.2
AFP SERPL-MCNC: 47.8 NG/ML
AGE AT DELIVERY: 30
B-HCG MOM SERPL: 1.24
B-HCG SERPL-ACNC: 33 IU/ML
COLLECT DATE BLD: NORMAL
COLLECT DATE: NORMAL
FET NASAL BONE LENGTH US.MEAS: NORMAL MM
FET NUCHAL FOLD MOM THICKNESS US.MEAS: 0.94
FET NUCHAL FOLD THICKNESS US.MEAS: 1.4 MM
FET TS 21 RISK FROM MAT AGE: NORMAL
GA (DAYS): 3 D
GA (WEEKS): 12 WK
GA METHOD: NORMAL
GEST. AGE (DAYS) 2ND SAMPLE (SS2): 3
GEST. AGE (WKS) 2ND SAMPLE (SS2): 17
IDDM PATIENT QL: NORMAL
INHIBIN A MOM SERPL: 1.44
INHIBIN A SERPL-MCNC: 200.7 PG/ML
INTEGRATED SCN PATIENT-IMP: NEGATIVE
PAPP-A MOM SERPL: 1.17
PAPP-A SERPL-MCNC: 929.6 NG/ML
SEQUENTIAL SCREEN PART 2 INTERP: NORMAL
SMOKING STATUS FTND: NO
TS 18 RISK FETUS: NORMAL
TS 21 RISK FETUS: NORMAL
U ESTRIOL MOM SERPL: 0.8
U ESTRIOL SERPL-MCNC: 1.15 NG/ML

## 2021-03-26 ENCOUNTER — PATIENT MESSAGE (OUTPATIENT)
Dept: RESEARCH | Facility: HOSPITAL | Age: 31
End: 2021-03-26

## 2021-03-29 ENCOUNTER — PROCEDURE VISIT (OUTPATIENT)
Dept: MATERNAL FETAL MEDICINE | Facility: CLINIC | Age: 31
End: 2021-03-29
Payer: COMMERCIAL

## 2021-03-29 DIAGNOSIS — Q51.3 BICORNUATE UTERUS: Primary | ICD-10-CM

## 2021-03-29 DIAGNOSIS — Z3A.11 11 WEEKS GESTATION OF PREGNANCY: ICD-10-CM

## 2021-03-29 PROCEDURE — 76805 OB US >/= 14 WKS SNGL FETUS: CPT | Mod: S$GLB,,, | Performed by: OBSTETRICS & GYNECOLOGY

## 2021-03-29 PROCEDURE — 76805 PR US, OB 14+WKS, TRANSABD, SINGLE GESTATION: ICD-10-PCS | Mod: S$GLB,,, | Performed by: OBSTETRICS & GYNECOLOGY

## 2021-03-30 ENCOUNTER — PATIENT MESSAGE (OUTPATIENT)
Dept: OBSTETRICS AND GYNECOLOGY | Facility: CLINIC | Age: 31
End: 2021-03-30

## 2021-04-06 ENCOUNTER — ROUTINE PRENATAL (OUTPATIENT)
Dept: OBSTETRICS AND GYNECOLOGY | Facility: CLINIC | Age: 31
End: 2021-04-06
Payer: COMMERCIAL

## 2021-04-06 VITALS
WEIGHT: 152.56 LBS | SYSTOLIC BLOOD PRESSURE: 116 MMHG | BODY MASS INDEX: 28.83 KG/M2 | DIASTOLIC BLOOD PRESSURE: 60 MMHG

## 2021-04-06 DIAGNOSIS — Q51.3 BICORNUATE UTERUS: ICD-10-CM

## 2021-04-06 DIAGNOSIS — Z34.02 SUPERVISION OF NORMAL FIRST PREGNANCY IN SECOND TRIMESTER: Primary | ICD-10-CM

## 2021-04-06 PROCEDURE — 99999 PR PBB SHADOW E&M-EST. PATIENT-LVL III: ICD-10-PCS | Mod: PBBFAC,,, | Performed by: OBSTETRICS & GYNECOLOGY

## 2021-04-06 PROCEDURE — 0502F PR SUBSEQUENT PRENATAL CARE: ICD-10-PCS | Mod: CPTII,S$GLB,, | Performed by: OBSTETRICS & GYNECOLOGY

## 2021-04-06 PROCEDURE — 99999 PR PBB SHADOW E&M-EST. PATIENT-LVL III: CPT | Mod: PBBFAC,,, | Performed by: OBSTETRICS & GYNECOLOGY

## 2021-04-06 PROCEDURE — 0502F SUBSEQUENT PRENATAL CARE: CPT | Mod: CPTII,S$GLB,, | Performed by: OBSTETRICS & GYNECOLOGY

## 2021-04-06 RX ORDER — PANTOPRAZOLE SODIUM 20 MG/1
20 TABLET, DELAYED RELEASE ORAL DAILY
Qty: 30 TABLET | Refills: 1 | Status: SHIPPED | OUTPATIENT
Start: 2021-04-06 | End: 2021-09-29

## 2021-04-12 ENCOUNTER — PATIENT MESSAGE (OUTPATIENT)
Dept: PEDIATRICS | Facility: CLINIC | Age: 31
End: 2021-04-12

## 2021-04-13 ENCOUNTER — PROCEDURE VISIT (OUTPATIENT)
Dept: MATERNAL FETAL MEDICINE | Facility: CLINIC | Age: 31
End: 2021-04-13
Payer: COMMERCIAL

## 2021-04-13 DIAGNOSIS — Z36.89 ENCOUNTER FOR ULTRASOUND TO ASSESS FETAL GROWTH: Primary | ICD-10-CM

## 2021-04-13 DIAGNOSIS — Q51.3 BICORNUATE UTERUS: ICD-10-CM

## 2021-04-13 DIAGNOSIS — Z36.86 ENCOUNTER FOR ANTENATAL SCREENING FOR CERVICAL LENGTH: ICD-10-CM

## 2021-04-13 PROCEDURE — 76815 OB US LIMITED FETUS(S): CPT | Mod: S$GLB,,, | Performed by: OBSTETRICS & GYNECOLOGY

## 2021-04-13 PROCEDURE — 76815 PR  US,PREGNANT UTERUS,LIMITED, 1/> FETUSES: ICD-10-PCS | Mod: S$GLB,,, | Performed by: OBSTETRICS & GYNECOLOGY

## 2021-04-13 PROCEDURE — 76817 TRANSVAGINAL US OBSTETRIC: CPT | Mod: S$GLB,,, | Performed by: OBSTETRICS & GYNECOLOGY

## 2021-04-13 PROCEDURE — 76817 PR US, OB, TRANSVAG APPROACH: ICD-10-PCS | Mod: S$GLB,,, | Performed by: OBSTETRICS & GYNECOLOGY

## 2021-04-21 ENCOUNTER — PATIENT MESSAGE (OUTPATIENT)
Dept: OBSTETRICS AND GYNECOLOGY | Facility: CLINIC | Age: 31
End: 2021-04-21

## 2021-05-03 ENCOUNTER — OFFICE VISIT (OUTPATIENT)
Dept: PEDIATRICS | Facility: CLINIC | Age: 31
End: 2021-05-03
Payer: COMMERCIAL

## 2021-05-03 DIAGNOSIS — Z76.81 EXPECTANT PARENT PREBIRTH PEDIATRICIAN VISIT: Primary | ICD-10-CM

## 2021-05-03 PROCEDURE — 99499 NO LOS: ICD-10-PCS | Mod: S$GLB,,, | Performed by: PEDIATRICS

## 2021-05-03 PROCEDURE — 99499 UNLISTED E&M SERVICE: CPT | Mod: S$GLB,,, | Performed by: PEDIATRICS

## 2021-05-06 ENCOUNTER — PATIENT MESSAGE (OUTPATIENT)
Dept: OBSTETRICS AND GYNECOLOGY | Facility: CLINIC | Age: 31
End: 2021-05-06

## 2021-05-10 ENCOUNTER — ROUTINE PRENATAL (OUTPATIENT)
Dept: OBSTETRICS AND GYNECOLOGY | Facility: CLINIC | Age: 31
End: 2021-05-10
Payer: COMMERCIAL

## 2021-05-10 ENCOUNTER — LAB VISIT (OUTPATIENT)
Dept: LAB | Facility: OTHER | Age: 31
End: 2021-05-10
Attending: OBSTETRICS & GYNECOLOGY
Payer: COMMERCIAL

## 2021-05-10 VITALS — BODY MASS INDEX: 29.28 KG/M2 | DIASTOLIC BLOOD PRESSURE: 66 MMHG | WEIGHT: 155 LBS | SYSTOLIC BLOOD PRESSURE: 112 MMHG

## 2021-05-10 DIAGNOSIS — Z34.02 SUPERVISION OF NORMAL FIRST PREGNANCY IN SECOND TRIMESTER: ICD-10-CM

## 2021-05-10 DIAGNOSIS — Z34.02 SUPERVISION OF NORMAL FIRST PREGNANCY IN SECOND TRIMESTER: Primary | ICD-10-CM

## 2021-05-10 DIAGNOSIS — Q51.3 BICORNUATE UTERUS: ICD-10-CM

## 2021-05-10 LAB
BASOPHILS # BLD AUTO: 0.03 K/UL (ref 0–0.2)
BASOPHILS NFR BLD: 0.4 % (ref 0–1.9)
DIFFERENTIAL METHOD: ABNORMAL
EOSINOPHIL # BLD AUTO: 0.2 K/UL (ref 0–0.5)
EOSINOPHIL NFR BLD: 2.7 % (ref 0–8)
ERYTHROCYTE [DISTWIDTH] IN BLOOD BY AUTOMATED COUNT: 13.4 % (ref 11.5–14.5)
GLUCOSE SERPL-MCNC: 114 MG/DL (ref 70–140)
HCT VFR BLD AUTO: 36.8 % (ref 37–48.5)
HGB BLD-MCNC: 12 G/DL (ref 12–16)
IMM GRANULOCYTES # BLD AUTO: 0.03 K/UL (ref 0–0.04)
IMM GRANULOCYTES NFR BLD AUTO: 0.4 % (ref 0–0.5)
LYMPHOCYTES # BLD AUTO: 1.4 K/UL (ref 1–4.8)
LYMPHOCYTES NFR BLD: 16.9 % (ref 18–48)
MCH RBC QN AUTO: 31.6 PG (ref 27–31)
MCHC RBC AUTO-ENTMCNC: 32.6 G/DL (ref 32–36)
MCV RBC AUTO: 97 FL (ref 82–98)
MONOCYTES # BLD AUTO: 0.5 K/UL (ref 0.3–1)
MONOCYTES NFR BLD: 6.3 % (ref 4–15)
NEUTROPHILS # BLD AUTO: 6.3 K/UL (ref 1.8–7.7)
NEUTROPHILS NFR BLD: 73.3 % (ref 38–73)
NRBC BLD-RTO: 0 /100 WBC
PLATELET # BLD AUTO: 267 K/UL (ref 150–450)
PMV BLD AUTO: 10.6 FL (ref 9.2–12.9)
RBC # BLD AUTO: 3.8 M/UL (ref 4–5.4)
WBC # BLD AUTO: 8.54 K/UL (ref 3.9–12.7)

## 2021-05-10 PROCEDURE — 99999 PR PBB SHADOW E&M-EST. PATIENT-LVL III: ICD-10-PCS | Mod: PBBFAC,,, | Performed by: OBSTETRICS & GYNECOLOGY

## 2021-05-10 PROCEDURE — 99999 PR PBB SHADOW E&M-EST. PATIENT-LVL III: CPT | Mod: PBBFAC,,, | Performed by: OBSTETRICS & GYNECOLOGY

## 2021-05-10 PROCEDURE — 36415 COLL VENOUS BLD VENIPUNCTURE: CPT | Performed by: OBSTETRICS & GYNECOLOGY

## 2021-05-10 PROCEDURE — 85025 COMPLETE CBC W/AUTO DIFF WBC: CPT | Performed by: OBSTETRICS & GYNECOLOGY

## 2021-05-10 PROCEDURE — 0502F SUBSEQUENT PRENATAL CARE: CPT | Mod: CPTII,S$GLB,, | Performed by: OBSTETRICS & GYNECOLOGY

## 2021-05-10 PROCEDURE — 82950 GLUCOSE TEST: CPT | Performed by: OBSTETRICS & GYNECOLOGY

## 2021-05-10 PROCEDURE — 0502F PR SUBSEQUENT PRENATAL CARE: ICD-10-PCS | Mod: CPTII,S$GLB,, | Performed by: OBSTETRICS & GYNECOLOGY

## 2021-06-14 ENCOUNTER — ROUTINE PRENATAL (OUTPATIENT)
Dept: OBSTETRICS AND GYNECOLOGY | Facility: CLINIC | Age: 31
End: 2021-06-14
Payer: COMMERCIAL

## 2021-06-14 VITALS
WEIGHT: 159.63 LBS | BODY MASS INDEX: 30.16 KG/M2 | DIASTOLIC BLOOD PRESSURE: 62 MMHG | SYSTOLIC BLOOD PRESSURE: 102 MMHG

## 2021-06-14 DIAGNOSIS — Q51.3 BICORNUATE UTERUS: ICD-10-CM

## 2021-06-14 DIAGNOSIS — Z34.93 SUPERVISION OF LOW-RISK PREGNANCY, THIRD TRIMESTER: Primary | ICD-10-CM

## 2021-06-14 PROCEDURE — 99999 PR PBB SHADOW E&M-EST. PATIENT-LVL II: ICD-10-PCS | Mod: PBBFAC,,, | Performed by: OBSTETRICS & GYNECOLOGY

## 2021-06-14 PROCEDURE — 0502F SUBSEQUENT PRENATAL CARE: CPT | Mod: CPTII,S$GLB,, | Performed by: OBSTETRICS & GYNECOLOGY

## 2021-06-14 PROCEDURE — 99999 PR PBB SHADOW E&M-EST. PATIENT-LVL II: CPT | Mod: PBBFAC,,, | Performed by: OBSTETRICS & GYNECOLOGY

## 2021-06-14 PROCEDURE — 0502F PR SUBSEQUENT PRENATAL CARE: ICD-10-PCS | Mod: CPTII,S$GLB,, | Performed by: OBSTETRICS & GYNECOLOGY

## 2021-06-17 ENCOUNTER — IMMUNIZATION (OUTPATIENT)
Dept: PHARMACY | Facility: CLINIC | Age: 31
End: 2021-06-17
Payer: COMMERCIAL

## 2021-06-25 ENCOUNTER — TELEPHONE (OUTPATIENT)
Dept: OBSTETRICS AND GYNECOLOGY | Facility: CLINIC | Age: 31
End: 2021-06-25

## 2021-06-28 ENCOUNTER — TELEPHONE (OUTPATIENT)
Dept: OBSTETRICS AND GYNECOLOGY | Facility: CLINIC | Age: 31
End: 2021-06-28

## 2021-06-30 ENCOUNTER — ROUTINE PRENATAL (OUTPATIENT)
Dept: OBSTETRICS AND GYNECOLOGY | Facility: CLINIC | Age: 31
End: 2021-06-30
Payer: COMMERCIAL

## 2021-06-30 ENCOUNTER — PROCEDURE VISIT (OUTPATIENT)
Dept: MATERNAL FETAL MEDICINE | Facility: CLINIC | Age: 31
End: 2021-06-30
Payer: COMMERCIAL

## 2021-06-30 VITALS — SYSTOLIC BLOOD PRESSURE: 112 MMHG | DIASTOLIC BLOOD PRESSURE: 76 MMHG | WEIGHT: 162.5 LBS | BODY MASS INDEX: 30.7 KG/M2

## 2021-06-30 DIAGNOSIS — Q51.3 BICORNUATE UTERUS: ICD-10-CM

## 2021-06-30 DIAGNOSIS — Z34.93 SUPERVISION OF LOW-RISK PREGNANCY, THIRD TRIMESTER: Primary | ICD-10-CM

## 2021-06-30 DIAGNOSIS — Z36.89 ENCOUNTER FOR ULTRASOUND TO ASSESS FETAL GROWTH: ICD-10-CM

## 2021-06-30 DIAGNOSIS — O34.03 CONGENITAL ABNORMALITY OF UTERUS DURING PREGNANCY IN THIRD TRIMESTER: ICD-10-CM

## 2021-06-30 PROCEDURE — 76816 PR  US,PREGNANT UTERUS,F/U,TRANSABD APP: ICD-10-PCS | Mod: S$GLB,,, | Performed by: OBSTETRICS & GYNECOLOGY

## 2021-06-30 PROCEDURE — 0502F SUBSEQUENT PRENATAL CARE: CPT | Mod: CPTII,S$GLB,, | Performed by: OBSTETRICS & GYNECOLOGY

## 2021-06-30 PROCEDURE — 99999 PR PBB SHADOW E&M-EST. PATIENT-LVL II: CPT | Mod: PBBFAC,,, | Performed by: OBSTETRICS & GYNECOLOGY

## 2021-06-30 PROCEDURE — 76816 OB US FOLLOW-UP PER FETUS: CPT | Mod: S$GLB,,, | Performed by: OBSTETRICS & GYNECOLOGY

## 2021-06-30 PROCEDURE — 0502F PR SUBSEQUENT PRENATAL CARE: ICD-10-PCS | Mod: CPTII,S$GLB,, | Performed by: OBSTETRICS & GYNECOLOGY

## 2021-06-30 PROCEDURE — 99999 PR PBB SHADOW E&M-EST. PATIENT-LVL II: ICD-10-PCS | Mod: PBBFAC,,, | Performed by: OBSTETRICS & GYNECOLOGY

## 2021-06-30 RX ORDER — CYCLOBENZAPRINE HCL 10 MG
10 TABLET ORAL 3 TIMES DAILY PRN
Qty: 30 TABLET | Refills: 1 | Status: SHIPPED | OUTPATIENT
Start: 2021-06-30 | End: 2021-07-10

## 2021-07-03 ENCOUNTER — PATIENT MESSAGE (OUTPATIENT)
Dept: OBSTETRICS AND GYNECOLOGY | Facility: CLINIC | Age: 31
End: 2021-07-03

## 2021-07-07 ENCOUNTER — PATIENT MESSAGE (OUTPATIENT)
Dept: OBSTETRICS AND GYNECOLOGY | Facility: CLINIC | Age: 31
End: 2021-07-07

## 2021-07-08 ENCOUNTER — OFFICE VISIT (OUTPATIENT)
Dept: URGENT CARE | Facility: CLINIC | Age: 31
End: 2021-07-08
Payer: COMMERCIAL

## 2021-07-08 ENCOUNTER — TELEPHONE (OUTPATIENT)
Dept: OBSTETRICS AND GYNECOLOGY | Facility: CLINIC | Age: 31
End: 2021-07-08

## 2021-07-08 VITALS
OXYGEN SATURATION: 98 % | HEART RATE: 87 BPM | WEIGHT: 162 LBS | DIASTOLIC BLOOD PRESSURE: 73 MMHG | SYSTOLIC BLOOD PRESSURE: 120 MMHG | RESPIRATION RATE: 18 BRPM | BODY MASS INDEX: 30.58 KG/M2 | TEMPERATURE: 98 F | HEIGHT: 61 IN

## 2021-07-08 DIAGNOSIS — J02.9 SORE THROAT: ICD-10-CM

## 2021-07-08 DIAGNOSIS — R05.9 COUGH: ICD-10-CM

## 2021-07-08 DIAGNOSIS — J06.9 VIRAL URI WITH COUGH: Primary | ICD-10-CM

## 2021-07-08 DIAGNOSIS — Z20.828 RSV EXPOSURE: ICD-10-CM

## 2021-07-08 LAB
CTP QC/QA: YES
SARS-COV-2 RDRP RESP QL NAA+PROBE: NEGATIVE

## 2021-07-08 PROCEDURE — 3008F PR BODY MASS INDEX (BMI) DOCUMENTED: ICD-10-PCS | Mod: CPTII,S$GLB,, | Performed by: NURSE PRACTITIONER

## 2021-07-08 PROCEDURE — 99213 PR OFFICE/OUTPT VISIT, EST, LEVL III, 20-29 MIN: ICD-10-PCS | Mod: S$GLB,CS,, | Performed by: NURSE PRACTITIONER

## 2021-07-08 PROCEDURE — U0002 COVID-19 LAB TEST NON-CDC: HCPCS | Mod: QW,S$GLB,, | Performed by: NURSE PRACTITIONER

## 2021-07-08 PROCEDURE — 99213 OFFICE O/P EST LOW 20 MIN: CPT | Mod: S$GLB,CS,, | Performed by: NURSE PRACTITIONER

## 2021-07-08 PROCEDURE — 3008F BODY MASS INDEX DOCD: CPT | Mod: CPTII,S$GLB,, | Performed by: NURSE PRACTITIONER

## 2021-07-08 PROCEDURE — U0002: ICD-10-PCS | Mod: QW,S$GLB,, | Performed by: NURSE PRACTITIONER

## 2021-07-12 ENCOUNTER — ROUTINE PRENATAL (OUTPATIENT)
Dept: OBSTETRICS AND GYNECOLOGY | Facility: CLINIC | Age: 31
End: 2021-07-12
Payer: COMMERCIAL

## 2021-07-12 VITALS — DIASTOLIC BLOOD PRESSURE: 70 MMHG | WEIGHT: 167.75 LBS | SYSTOLIC BLOOD PRESSURE: 110 MMHG | BODY MASS INDEX: 31.7 KG/M2

## 2021-07-12 DIAGNOSIS — Z34.83 ENCOUNTER FOR SUPERVISION OF OTHER NORMAL PREGNANCY, THIRD TRIMESTER: Primary | ICD-10-CM

## 2021-07-12 PROCEDURE — 99999 PR PBB SHADOW E&M-EST. PATIENT-LVL II: ICD-10-PCS | Mod: PBBFAC,,, | Performed by: OBSTETRICS & GYNECOLOGY

## 2021-07-12 PROCEDURE — 0502F PR SUBSEQUENT PRENATAL CARE: ICD-10-PCS | Mod: CPTII,S$GLB,, | Performed by: OBSTETRICS & GYNECOLOGY

## 2021-07-12 PROCEDURE — 99999 PR PBB SHADOW E&M-EST. PATIENT-LVL II: CPT | Mod: PBBFAC,,, | Performed by: OBSTETRICS & GYNECOLOGY

## 2021-07-12 PROCEDURE — 0502F SUBSEQUENT PRENATAL CARE: CPT | Mod: CPTII,S$GLB,, | Performed by: OBSTETRICS & GYNECOLOGY

## 2021-07-30 ENCOUNTER — LAB VISIT (OUTPATIENT)
Dept: LAB | Facility: OTHER | Age: 31
End: 2021-07-30
Attending: NURSE PRACTITIONER
Payer: COMMERCIAL

## 2021-07-30 ENCOUNTER — ROUTINE PRENATAL (OUTPATIENT)
Dept: OBSTETRICS AND GYNECOLOGY | Facility: CLINIC | Age: 31
End: 2021-07-30
Payer: COMMERCIAL

## 2021-07-30 VITALS — BODY MASS INDEX: 32.4 KG/M2 | DIASTOLIC BLOOD PRESSURE: 70 MMHG | WEIGHT: 171.5 LBS | SYSTOLIC BLOOD PRESSURE: 130 MMHG

## 2021-07-30 DIAGNOSIS — Z3A.36 36 WEEKS GESTATION OF PREGNANCY: ICD-10-CM

## 2021-07-30 DIAGNOSIS — Z3A.36 36 WEEKS GESTATION OF PREGNANCY: Primary | ICD-10-CM

## 2021-07-30 LAB
ALBUMIN SERPL BCP-MCNC: 2.9 G/DL (ref 3.5–5.2)
ALP SERPL-CCNC: 165 U/L (ref 55–135)
ALT SERPL W/O P-5'-P-CCNC: 15 U/L (ref 10–44)
ANION GAP SERPL CALC-SCNC: 9 MMOL/L (ref 8–16)
AST SERPL-CCNC: 19 U/L (ref 10–40)
BASOPHILS # BLD AUTO: 0.03 K/UL (ref 0–0.2)
BASOPHILS NFR BLD: 0.3 % (ref 0–1.9)
BILIRUB SERPL-MCNC: 0.3 MG/DL (ref 0.1–1)
BUN SERPL-MCNC: 10 MG/DL (ref 6–20)
CALCIUM SERPL-MCNC: 9.3 MG/DL (ref 8.7–10.5)
CHLORIDE SERPL-SCNC: 103 MMOL/L (ref 95–110)
CO2 SERPL-SCNC: 23 MMOL/L (ref 23–29)
CREAT SERPL-MCNC: 0.7 MG/DL (ref 0.5–1.4)
DIFFERENTIAL METHOD: ABNORMAL
EOSINOPHIL # BLD AUTO: 0.2 K/UL (ref 0–0.5)
EOSINOPHIL NFR BLD: 2 % (ref 0–8)
ERYTHROCYTE [DISTWIDTH] IN BLOOD BY AUTOMATED COUNT: 13.3 % (ref 11.5–14.5)
EST. GFR  (AFRICAN AMERICAN): >60 ML/MIN/1.73 M^2
EST. GFR  (NON AFRICAN AMERICAN): >60 ML/MIN/1.73 M^2
GLUCOSE SERPL-MCNC: 74 MG/DL (ref 70–110)
HCT VFR BLD AUTO: 39.6 % (ref 37–48.5)
HGB BLD-MCNC: 12.9 G/DL (ref 12–16)
IMM GRANULOCYTES # BLD AUTO: 0.03 K/UL (ref 0–0.04)
IMM GRANULOCYTES NFR BLD AUTO: 0.3 % (ref 0–0.5)
LDH SERPL L TO P-CCNC: 200 U/L (ref 110–260)
LYMPHOCYTES # BLD AUTO: 2 K/UL (ref 1–4.8)
LYMPHOCYTES NFR BLD: 21.7 % (ref 18–48)
MCH RBC QN AUTO: 30.1 PG (ref 27–31)
MCHC RBC AUTO-ENTMCNC: 32.6 G/DL (ref 32–36)
MCV RBC AUTO: 93 FL (ref 82–98)
MONOCYTES # BLD AUTO: 1.1 K/UL (ref 0.3–1)
MONOCYTES NFR BLD: 11.3 % (ref 4–15)
NEUTROPHILS # BLD AUTO: 6 K/UL (ref 1.8–7.7)
NEUTROPHILS NFR BLD: 64.4 % (ref 38–73)
NRBC BLD-RTO: 0 /100 WBC
PLATELET # BLD AUTO: 218 K/UL (ref 150–450)
PMV BLD AUTO: 11.8 FL (ref 9.2–12.9)
POTASSIUM SERPL-SCNC: 4.1 MMOL/L (ref 3.5–5.1)
PROT SERPL-MCNC: 6.6 G/DL (ref 6–8.4)
RBC # BLD AUTO: 4.28 M/UL (ref 4–5.4)
SODIUM SERPL-SCNC: 135 MMOL/L (ref 136–145)
WBC # BLD AUTO: 9.38 K/UL (ref 3.9–12.7)

## 2021-07-30 PROCEDURE — 85025 COMPLETE CBC W/AUTO DIFF WBC: CPT | Performed by: NURSE PRACTITIONER

## 2021-07-30 PROCEDURE — 36415 COLL VENOUS BLD VENIPUNCTURE: CPT | Performed by: NURSE PRACTITIONER

## 2021-07-30 PROCEDURE — 87147 CULTURE TYPE IMMUNOLOGIC: CPT | Performed by: NURSE PRACTITIONER

## 2021-07-30 PROCEDURE — 99999 PR PBB SHADOW E&M-EST. PATIENT-LVL III: ICD-10-PCS | Mod: PBBFAC,,, | Performed by: NURSE PRACTITIONER

## 2021-07-30 PROCEDURE — 87389 HIV-1 AG W/HIV-1&-2 AB AG IA: CPT | Performed by: NURSE PRACTITIONER

## 2021-07-30 PROCEDURE — 0502F PR SUBSEQUENT PRENATAL CARE: ICD-10-PCS | Mod: CPTII,S$GLB,, | Performed by: NURSE PRACTITIONER

## 2021-07-30 PROCEDURE — 80053 COMPREHEN METABOLIC PANEL: CPT | Performed by: NURSE PRACTITIONER

## 2021-07-30 PROCEDURE — 87184 SC STD DISK METHOD PER PLATE: CPT | Performed by: NURSE PRACTITIONER

## 2021-07-30 PROCEDURE — 0502F SUBSEQUENT PRENATAL CARE: CPT | Mod: CPTII,S$GLB,, | Performed by: NURSE PRACTITIONER

## 2021-07-30 PROCEDURE — 87081 CULTURE SCREEN ONLY: CPT | Performed by: NURSE PRACTITIONER

## 2021-07-30 PROCEDURE — 99999 PR PBB SHADOW E&M-EST. PATIENT-LVL III: CPT | Mod: PBBFAC,,, | Performed by: NURSE PRACTITIONER

## 2021-07-30 PROCEDURE — 86592 SYPHILIS TEST NON-TREP QUAL: CPT | Performed by: NURSE PRACTITIONER

## 2021-07-30 PROCEDURE — 83615 LACTATE (LD) (LDH) ENZYME: CPT | Performed by: NURSE PRACTITIONER

## 2021-08-02 ENCOUNTER — ANESTHESIA EVENT (OUTPATIENT)
Dept: OBSTETRICS AND GYNECOLOGY | Facility: OTHER | Age: 31
End: 2021-08-02
Payer: COMMERCIAL

## 2021-08-02 ENCOUNTER — HOSPITAL ENCOUNTER (INPATIENT)
Facility: OTHER | Age: 31
LOS: 4 days | Discharge: HOME | End: 2021-08-06
Attending: OBSTETRICS & GYNECOLOGY | Admitting: OBSTETRICS & GYNECOLOGY
Payer: COMMERCIAL

## 2021-08-02 ENCOUNTER — ANESTHESIA (OUTPATIENT)
Dept: OBSTETRICS AND GYNECOLOGY | Facility: OTHER | Age: 31
End: 2021-08-02
Payer: COMMERCIAL

## 2021-08-02 DIAGNOSIS — O42.90 PROM (PREMATURE RUPTURE OF MEMBRANES): ICD-10-CM

## 2021-08-02 DIAGNOSIS — Z3A.37 37 WEEKS GESTATION OF PREGNANCY: ICD-10-CM

## 2021-08-02 DIAGNOSIS — Z98.891 S/P CESAREAN SECTION: Primary | ICD-10-CM

## 2021-08-02 LAB
ABO + RH BLD: NORMAL
ALBUMIN SERPL BCP-MCNC: 2.7 G/DL (ref 3.5–5.2)
ALP SERPL-CCNC: 158 U/L (ref 55–135)
ALT SERPL W/O P-5'-P-CCNC: 17 U/L (ref 10–44)
ANION GAP SERPL CALC-SCNC: 10 MMOL/L (ref 8–16)
AST SERPL-CCNC: 21 U/L (ref 10–40)
BACTERIA SPEC AEROBE CULT: ABNORMAL
BASOPHILS # BLD AUTO: 0.05 K/UL (ref 0–0.2)
BASOPHILS NFR BLD: 0.5 % (ref 0–1.9)
BILIRUB SERPL-MCNC: 0.2 MG/DL (ref 0.1–1)
BLD GP AB SCN CELLS X3 SERPL QL: NORMAL
BUN SERPL-MCNC: 13 MG/DL (ref 6–20)
CALCIUM SERPL-MCNC: 8.9 MG/DL (ref 8.7–10.5)
CHLORIDE SERPL-SCNC: 106 MMOL/L (ref 95–110)
CO2 SERPL-SCNC: 19 MMOL/L (ref 23–29)
CREAT SERPL-MCNC: 0.7 MG/DL (ref 0.5–1.4)
CREAT UR-MCNC: 94.7 MG/DL (ref 15–325)
DIFFERENTIAL METHOD: NORMAL
EOSINOPHIL # BLD AUTO: 0.2 K/UL (ref 0–0.5)
EOSINOPHIL NFR BLD: 1.5 % (ref 0–8)
ERYTHROCYTE [DISTWIDTH] IN BLOOD BY AUTOMATED COUNT: 13.2 % (ref 11.5–14.5)
EST. GFR  (AFRICAN AMERICAN): >60 ML/MIN/1.73 M^2
EST. GFR  (NON AFRICAN AMERICAN): >60 ML/MIN/1.73 M^2
GLUCOSE SERPL-MCNC: 80 MG/DL (ref 70–110)
HCT VFR BLD AUTO: 38.4 % (ref 37–48.5)
HGB BLD-MCNC: 12.7 G/DL (ref 12–16)
HIV 1+2 AB+HIV1 P24 AG SERPL QL IA: NEGATIVE
IMM GRANULOCYTES # BLD AUTO: 0.03 K/UL (ref 0–0.04)
IMM GRANULOCYTES NFR BLD AUTO: 0.3 % (ref 0–0.5)
LYMPHOCYTES # BLD AUTO: 2.4 K/UL (ref 1–4.8)
LYMPHOCYTES NFR BLD: 24.3 % (ref 18–48)
MCH RBC QN AUTO: 30.8 PG (ref 27–31)
MCHC RBC AUTO-ENTMCNC: 33.1 G/DL (ref 32–36)
MCV RBC AUTO: 93 FL (ref 82–98)
MONOCYTES # BLD AUTO: 0.9 K/UL (ref 0.3–1)
MONOCYTES NFR BLD: 8.9 % (ref 4–15)
NEUTROPHILS # BLD AUTO: 6.3 K/UL (ref 1.8–7.7)
NEUTROPHILS NFR BLD: 64.5 % (ref 38–73)
NRBC BLD-RTO: 0 /100 WBC
PLATELET # BLD AUTO: 191 K/UL (ref 150–450)
PMV BLD AUTO: 12.4 FL (ref 9.2–12.9)
POTASSIUM SERPL-SCNC: 4.3 MMOL/L (ref 3.5–5.1)
PROT SERPL-MCNC: 6.2 G/DL (ref 6–8.4)
PROT UR-MCNC: 30 MG/DL (ref 0–15)
PROT/CREAT UR: 0.32 MG/G{CREAT} (ref 0–0.2)
RBC # BLD AUTO: 4.13 M/UL (ref 4–5.4)
RPR SER QL: NORMAL
SARS-COV-2 RDRP RESP QL NAA+PROBE: NEGATIVE
SODIUM SERPL-SCNC: 135 MMOL/L (ref 136–145)
WBC # BLD AUTO: 9.78 K/UL (ref 3.9–12.7)

## 2021-08-02 PROCEDURE — 85025 COMPLETE CBC W/AUTO DIFF WBC: CPT | Performed by: STUDENT IN AN ORGANIZED HEALTH CARE EDUCATION/TRAINING PROGRAM

## 2021-08-02 PROCEDURE — 25000003 PHARM REV CODE 250: Performed by: STUDENT IN AN ORGANIZED HEALTH CARE EDUCATION/TRAINING PROGRAM

## 2021-08-02 PROCEDURE — 80053 COMPREHEN METABOLIC PANEL: CPT | Performed by: STUDENT IN AN ORGANIZED HEALTH CARE EDUCATION/TRAINING PROGRAM

## 2021-08-02 PROCEDURE — 63600175 PHARM REV CODE 636 W HCPCS: Performed by: STUDENT IN AN ORGANIZED HEALTH CARE EDUCATION/TRAINING PROGRAM

## 2021-08-02 PROCEDURE — 63600175 PHARM REV CODE 636 W HCPCS

## 2021-08-02 PROCEDURE — 99285 EMERGENCY DEPT VISIT HI MDM: CPT | Mod: 25

## 2021-08-02 PROCEDURE — 11000001 HC ACUTE MED/SURG PRIVATE ROOM

## 2021-08-02 PROCEDURE — 86900 BLOOD TYPING SEROLOGIC ABO: CPT | Performed by: STUDENT IN AN ORGANIZED HEALTH CARE EDUCATION/TRAINING PROGRAM

## 2021-08-02 PROCEDURE — U0002 COVID-19 LAB TEST NON-CDC: HCPCS | Performed by: STUDENT IN AN ORGANIZED HEALTH CARE EDUCATION/TRAINING PROGRAM

## 2021-08-02 PROCEDURE — 82570 ASSAY OF URINE CREATININE: CPT | Performed by: STUDENT IN AN ORGANIZED HEALTH CARE EDUCATION/TRAINING PROGRAM

## 2021-08-02 RX ORDER — OXYTOCIN/RINGER'S LACTATE 30/500 ML
334 PLASTIC BAG, INJECTION (ML) INTRAVENOUS ONCE
Status: DISCONTINUED | OUTPATIENT
Start: 2021-08-02 | End: 2021-08-03

## 2021-08-02 RX ORDER — OXYTOCIN/RINGER'S LACTATE 30/500 ML
0-30 PLASTIC BAG, INJECTION (ML) INTRAVENOUS CONTINUOUS
Status: DISCONTINUED | OUTPATIENT
Start: 2021-08-02 | End: 2021-08-03

## 2021-08-02 RX ORDER — MISOPROSTOL 100 UG/1
100 TABLET ORAL ONCE
Status: DISCONTINUED | OUTPATIENT
Start: 2021-08-02 | End: 2021-08-02

## 2021-08-02 RX ORDER — SIMETHICONE 80 MG
1 TABLET,CHEWABLE ORAL 4 TIMES DAILY PRN
Status: DISCONTINUED | OUTPATIENT
Start: 2021-08-02 | End: 2021-08-03

## 2021-08-02 RX ORDER — VENLAFAXINE HYDROCHLORIDE 75 MG/1
75 CAPSULE, EXTENDED RELEASE ORAL DAILY
Status: DISCONTINUED | OUTPATIENT
Start: 2021-08-02 | End: 2021-08-06 | Stop reason: HOSPADM

## 2021-08-02 RX ORDER — PROCHLORPERAZINE EDISYLATE 5 MG/ML
5 INJECTION INTRAMUSCULAR; INTRAVENOUS EVERY 6 HOURS PRN
Status: DISCONTINUED | OUTPATIENT
Start: 2021-08-02 | End: 2021-08-03

## 2021-08-02 RX ORDER — TRANEXAMIC ACID 100 MG/ML
1000 INJECTION, SOLUTION INTRAVENOUS ONCE AS NEEDED
Status: DISCONTINUED | OUTPATIENT
Start: 2021-08-02 | End: 2021-08-03

## 2021-08-02 RX ORDER — SODIUM CHLORIDE, SODIUM LACTATE, POTASSIUM CHLORIDE, CALCIUM CHLORIDE 600; 310; 30; 20 MG/100ML; MG/100ML; MG/100ML; MG/100ML
INJECTION, SOLUTION INTRAVENOUS CONTINUOUS
Status: DISCONTINUED | OUTPATIENT
Start: 2021-08-02 | End: 2021-08-03

## 2021-08-02 RX ORDER — ONDANSETRON 8 MG/1
8 TABLET, ORALLY DISINTEGRATING ORAL EVERY 8 HOURS PRN
Status: DISCONTINUED | OUTPATIENT
Start: 2021-08-02 | End: 2021-08-03

## 2021-08-02 RX ORDER — CALCIUM CARBONATE 200(500)MG
500 TABLET,CHEWABLE ORAL 3 TIMES DAILY PRN
Status: DISCONTINUED | OUTPATIENT
Start: 2021-08-02 | End: 2021-08-03

## 2021-08-02 RX ORDER — OXYTOCIN/RINGER'S LACTATE 30/500 ML
95 PLASTIC BAG, INJECTION (ML) INTRAVENOUS ONCE
Status: DISCONTINUED | OUTPATIENT
Start: 2021-08-02 | End: 2021-08-03

## 2021-08-02 RX ADMIN — MISOPROSTOL 50 MCG: 100 TABLET ORAL at 11:08

## 2021-08-02 RX ADMIN — SODIUM CHLORIDE, SODIUM LACTATE, POTASSIUM CHLORIDE, AND CALCIUM CHLORIDE: .6; .31; .03; .02 INJECTION, SOLUTION INTRAVENOUS at 06:08

## 2021-08-02 RX ADMIN — MISOPROSTOL 50 MCG: 100 TABLET ORAL at 05:08

## 2021-08-02 RX ADMIN — DEXTROSE 5 MILLION UNITS: 50 INJECTION, SOLUTION INTRAVENOUS at 10:08

## 2021-08-02 RX ADMIN — DEXTROSE 3 MILLION UNITS: 50 INJECTION, SOLUTION INTRAVENOUS at 07:08

## 2021-08-02 RX ADMIN — Medication 2 MILLI-UNITS/MIN: at 10:08

## 2021-08-02 RX ADMIN — DEXTROSE 3 MILLION UNITS: 50 INJECTION, SOLUTION INTRAVENOUS at 03:08

## 2021-08-03 PROBLEM — F32.A DEPRESSION: Status: ACTIVE | Noted: 2021-08-03

## 2021-08-03 PROBLEM — Z98.891 S/P CESAREAN SECTION: Status: ACTIVE | Noted: 2021-08-03

## 2021-08-03 PROBLEM — O14.93 PRE-ECLAMPSIA IN THIRD TRIMESTER: Status: ACTIVE | Noted: 2021-08-03

## 2021-08-03 LAB
HCO3 UR-SCNC: 22.3 MMOL/L (ref 24–28)
HCO3 UR-SCNC: 22.4 MMOL/L (ref 24–28)
HCT VFR BLD CALC: 50 %PCV (ref 36–54)
HCT VFR BLD CALC: 51 %PCV (ref 36–54)
HGB BLD-MCNC: 17 G/DL
HGB BLD-MCNC: 17 G/DL
PCO2 BLDA: 85.9 MMHG (ref 35–45)
PCO2 BLDA: 89.6 MMHG (ref 35–45)
PH SMN: 7 [PH] (ref 7.35–7.45)
PH SMN: 7.02 [PH] (ref 7.35–7.45)
PO2 BLDA: 5 MMHG (ref 80–100)
PO2 BLDA: 9 MMHG (ref 80–100)
POC BE: -9 MMOL/L
POC BE: -9 MMOL/L
POC IONIZED CALCIUM: 1.4 MMOL/L (ref 1.06–1.42)
POC IONIZED CALCIUM: 1.46 MMOL/L (ref 1.06–1.42)
POC SATURATED O2: 2 % (ref 95–100)
POC SATURATED O2: 4 % (ref 95–100)
POC TCO2: 25 MMOL/L (ref 23–27)
POC TCO2: 25 MMOL/L (ref 23–27)
POTASSIUM BLD-SCNC: 5.8 MMOL/L (ref 3.5–5.1)
POTASSIUM BLD-SCNC: 6.1 MMOL/L (ref 3.5–5.1)
SAMPLE: ABNORMAL
SAMPLE: ABNORMAL
SODIUM BLD-SCNC: 131 MMOL/L (ref 136–145)
SODIUM BLD-SCNC: 132 MMOL/L (ref 136–145)

## 2021-08-03 PROCEDURE — 25000003 PHARM REV CODE 250

## 2021-08-03 PROCEDURE — 72100003 HC LABOR CARE, EA. ADDL. 8 HRS

## 2021-08-03 PROCEDURE — 01968 ANES/ANALG CS DLVR NEURAXIAL: CPT | Mod: QY,,, | Performed by: ANESTHESIOLOGY

## 2021-08-03 PROCEDURE — 25000003 PHARM REV CODE 250: Performed by: STUDENT IN AN ORGANIZED HEALTH CARE EDUCATION/TRAINING PROGRAM

## 2021-08-03 PROCEDURE — 51702 INSERT TEMP BLADDER CATH: CPT

## 2021-08-03 PROCEDURE — 25000003 PHARM REV CODE 250: Performed by: ANESTHESIOLOGY

## 2021-08-03 PROCEDURE — 71000033 HC RECOVERY, INTIAL HOUR: Performed by: OBSTETRICS & GYNECOLOGY

## 2021-08-03 PROCEDURE — 82803 BLOOD GASES ANY COMBINATION: CPT

## 2021-08-03 PROCEDURE — 11000001 HC ACUTE MED/SURG PRIVATE ROOM

## 2021-08-03 PROCEDURE — 63600175 PHARM REV CODE 636 W HCPCS: Performed by: STUDENT IN AN ORGANIZED HEALTH CARE EDUCATION/TRAINING PROGRAM

## 2021-08-03 PROCEDURE — 99900035 HC TECH TIME PER 15 MIN (STAT)

## 2021-08-03 PROCEDURE — 59514 CESAREAN DELIVERY ONLY: CPT | Mod: QY,,, | Performed by: ANESTHESIOLOGY

## 2021-08-03 PROCEDURE — 62326 NJX INTERLAMINAR LMBR/SAC: CPT | Performed by: ANESTHESIOLOGY

## 2021-08-03 PROCEDURE — 36004724 HC OB OR TIME LEV III - 1ST 15 MIN: Performed by: OBSTETRICS & GYNECOLOGY

## 2021-08-03 PROCEDURE — 37000009 HC ANESTHESIA EA ADD 15 MINS: Performed by: OBSTETRICS & GYNECOLOGY

## 2021-08-03 PROCEDURE — 59510 PR FULL ROUT OBSTE CARE,CESAREAN DELIV: ICD-10-PCS | Mod: AT,,, | Performed by: OBSTETRICS & GYNECOLOGY

## 2021-08-03 PROCEDURE — 59514 PRA REAN DELIVERY ONLY: ICD-10-PCS | Mod: QY,,, | Performed by: ANESTHESIOLOGY

## 2021-08-03 PROCEDURE — 01968 PR INSERT CATH,ART,PERCUT,SHORTTERM: ICD-10-PCS | Mod: QY,,, | Performed by: ANESTHESIOLOGY

## 2021-08-03 PROCEDURE — 71000039 HC RECOVERY, EACH ADD'L HOUR: Performed by: OBSTETRICS & GYNECOLOGY

## 2021-08-03 PROCEDURE — 63600175 PHARM REV CODE 636 W HCPCS

## 2021-08-03 PROCEDURE — 37000008 HC ANESTHESIA 1ST 15 MINUTES: Performed by: OBSTETRICS & GYNECOLOGY

## 2021-08-03 PROCEDURE — 59510 CESAREAN DELIVERY: CPT | Mod: AT,,, | Performed by: OBSTETRICS & GYNECOLOGY

## 2021-08-03 PROCEDURE — 36004725 HC OB OR TIME LEV III - EA ADD 15 MIN: Performed by: OBSTETRICS & GYNECOLOGY

## 2021-08-03 PROCEDURE — 63600175 PHARM REV CODE 636 W HCPCS: Performed by: ANESTHESIOLOGY

## 2021-08-03 PROCEDURE — 36416 COLLJ CAPILLARY BLOOD SPEC: CPT

## 2021-08-03 RX ORDER — MORPHINE SULFATE 0.5 MG/ML
INJECTION, SOLUTION EPIDURAL; INTRATHECAL; INTRAVENOUS
Status: DISCONTINUED | OUTPATIENT
Start: 2021-08-03 | End: 2021-08-03

## 2021-08-03 RX ORDER — PRENATAL WITH FERROUS FUM AND FOLIC ACID 3080; 920; 120; 400; 22; 1.84; 3; 20; 10; 1; 12; 200; 27; 25; 2 [IU]/1; [IU]/1; MG/1; [IU]/1; MG/1; MG/1; MG/1; MG/1; MG/1; MG/1; UG/1; MG/1; MG/1; MG/1; MG/1
1 TABLET ORAL DAILY
Status: DISCONTINUED | OUTPATIENT
Start: 2021-08-04 | End: 2021-08-06 | Stop reason: HOSPADM

## 2021-08-03 RX ORDER — BISACODYL 10 MG
10 SUPPOSITORY, RECTAL RECTAL ONCE AS NEEDED
Status: DISCONTINUED | OUTPATIENT
Start: 2021-08-03 | End: 2021-08-06 | Stop reason: HOSPADM

## 2021-08-03 RX ORDER — FAMOTIDINE 10 MG/ML
20 INJECTION INTRAVENOUS ONCE
Status: COMPLETED | OUTPATIENT
Start: 2021-08-03 | End: 2021-08-03

## 2021-08-03 RX ORDER — OXYCODONE HYDROCHLORIDE 5 MG/1
10 TABLET ORAL EVERY 4 HOURS PRN
Status: DISPENSED | OUTPATIENT
Start: 2021-08-03 | End: 2021-08-04

## 2021-08-03 RX ORDER — CARBOPROST TROMETHAMINE 250 UG/ML
250 INJECTION, SOLUTION INTRAMUSCULAR
Status: DISCONTINUED | OUTPATIENT
Start: 2021-08-03 | End: 2021-08-06 | Stop reason: HOSPADM

## 2021-08-03 RX ORDER — SODIUM CITRATE AND CITRIC ACID MONOHYDRATE 334; 500 MG/5ML; MG/5ML
30 SOLUTION ORAL ONCE
Status: COMPLETED | OUTPATIENT
Start: 2021-08-03 | End: 2021-08-03

## 2021-08-03 RX ORDER — CHLORHEXIDINE GLUCONATE ORAL RINSE 1.2 MG/ML
10 SOLUTION DENTAL 2 TIMES DAILY
Status: DISCONTINUED | OUTPATIENT
Start: 2021-08-03 | End: 2021-08-06 | Stop reason: HOSPADM

## 2021-08-03 RX ORDER — ACETAMINOPHEN 325 MG/1
650 TABLET ORAL EVERY 6 HOURS
Status: DISPENSED | OUTPATIENT
Start: 2021-08-04 | End: 2021-08-04

## 2021-08-03 RX ORDER — OXYTOCIN/RINGER'S LACTATE 30/500 ML
95 PLASTIC BAG, INJECTION (ML) INTRAVENOUS ONCE
Status: DISCONTINUED | OUTPATIENT
Start: 2021-08-03 | End: 2021-08-06 | Stop reason: HOSPADM

## 2021-08-03 RX ORDER — FENTANYL CITRATE 50 UG/ML
INJECTION, SOLUTION INTRAMUSCULAR; INTRAVENOUS
Status: COMPLETED
Start: 2021-08-03 | End: 2021-08-03

## 2021-08-03 RX ORDER — AMOXICILLIN 250 MG
1 CAPSULE ORAL NIGHTLY PRN
Status: DISCONTINUED | OUTPATIENT
Start: 2021-08-03 | End: 2021-08-06 | Stop reason: HOSPADM

## 2021-08-03 RX ORDER — KETOROLAC TROMETHAMINE 30 MG/ML
INJECTION, SOLUTION INTRAMUSCULAR; INTRAVENOUS
Status: COMPLETED
Start: 2021-08-03 | End: 2021-08-03

## 2021-08-03 RX ORDER — FENTANYL CITRATE 50 UG/ML
INJECTION, SOLUTION INTRAMUSCULAR; INTRAVENOUS
Status: DISCONTINUED | OUTPATIENT
Start: 2021-08-03 | End: 2021-08-03

## 2021-08-03 RX ORDER — BUPIVACAINE HYDROCHLORIDE 2.5 MG/ML
INJECTION, SOLUTION EPIDURAL; INFILTRATION; INTRACAUDAL
Status: DISPENSED
Start: 2021-08-03 | End: 2021-08-03

## 2021-08-03 RX ORDER — SIMETHICONE 80 MG
1 TABLET,CHEWABLE ORAL EVERY 6 HOURS PRN
Status: DISCONTINUED | OUTPATIENT
Start: 2021-08-03 | End: 2021-08-06 | Stop reason: HOSPADM

## 2021-08-03 RX ORDER — MISOPROSTOL 200 UG/1
800 TABLET ORAL
Status: DISCONTINUED | OUTPATIENT
Start: 2021-08-03 | End: 2021-08-06 | Stop reason: HOSPADM

## 2021-08-03 RX ORDER — ONDANSETRON 2 MG/ML
INJECTION INTRAMUSCULAR; INTRAVENOUS
Status: DISCONTINUED | OUTPATIENT
Start: 2021-08-03 | End: 2021-08-03

## 2021-08-03 RX ORDER — FENTANYL/BUPIVACAINE/NS/PF 2MCG/ML-.1
PLASTIC BAG, INJECTION (ML) INJECTION CONTINUOUS
Status: DISCONTINUED | OUTPATIENT
Start: 2021-08-03 | End: 2021-08-06 | Stop reason: HOSPADM

## 2021-08-03 RX ORDER — PHENYLEPHRINE HCL IN 0.9% NACL 1 MG/10 ML
SYRINGE (ML) INTRAVENOUS
Status: DISCONTINUED | OUTPATIENT
Start: 2021-08-03 | End: 2021-08-03

## 2021-08-03 RX ORDER — OXYCODONE AND ACETAMINOPHEN 5; 325 MG/1; MG/1
1 TABLET ORAL EVERY 4 HOURS PRN
Status: DISCONTINUED | OUTPATIENT
Start: 2021-08-03 | End: 2021-08-06 | Stop reason: HOSPADM

## 2021-08-03 RX ORDER — CHLOROPROCAINE HYDROCHLORIDE 20 MG/ML
INJECTION, SOLUTION EPIDURAL; INFILTRATION; INTRACAUDAL; PERINEURAL
Status: DISCONTINUED | OUTPATIENT
Start: 2021-08-03 | End: 2021-08-03

## 2021-08-03 RX ORDER — DIPHENHYDRAMINE HCL 25 MG
25 CAPSULE ORAL EVERY 4 HOURS PRN
Status: DISCONTINUED | OUTPATIENT
Start: 2021-08-03 | End: 2021-08-06 | Stop reason: HOSPADM

## 2021-08-03 RX ORDER — METHYLERGONOVINE MALEATE 0.2 MG/ML
200 INJECTION INTRAVENOUS
Status: DISCONTINUED | OUTPATIENT
Start: 2021-08-03 | End: 2021-08-06 | Stop reason: HOSPADM

## 2021-08-03 RX ORDER — NALBUPHINE HYDROCHLORIDE 10 MG/ML
2.5 INJECTION, SOLUTION INTRAMUSCULAR; INTRAVENOUS; SUBCUTANEOUS ONCE
Status: DISCONTINUED | OUTPATIENT
Start: 2021-08-03 | End: 2021-08-06 | Stop reason: HOSPADM

## 2021-08-03 RX ORDER — ADHESIVE BANDAGE
30 BANDAGE TOPICAL 2 TIMES DAILY PRN
Status: DISCONTINUED | OUTPATIENT
Start: 2021-08-04 | End: 2021-08-06 | Stop reason: HOSPADM

## 2021-08-03 RX ORDER — IBUPROFEN 600 MG/1
600 TABLET ORAL EVERY 6 HOURS
Status: DISCONTINUED | OUTPATIENT
Start: 2021-08-04 | End: 2021-08-04

## 2021-08-03 RX ORDER — DIPHENOXYLATE HYDROCHLORIDE AND ATROPINE SULFATE 2.5; .025 MG/1; MG/1
1 TABLET ORAL 4 TIMES DAILY PRN
Status: DISCONTINUED | OUTPATIENT
Start: 2021-08-03 | End: 2021-08-06 | Stop reason: HOSPADM

## 2021-08-03 RX ORDER — FENTANYL/BUPIVACAINE/NS/PF 2MCG/ML-.1
PLASTIC BAG, INJECTION (ML) INJECTION
Status: COMPLETED
Start: 2021-08-03 | End: 2021-08-03

## 2021-08-03 RX ORDER — KETOROLAC TROMETHAMINE 30 MG/ML
30 INJECTION, SOLUTION INTRAMUSCULAR; INTRAVENOUS EVERY 6 HOURS
Status: ACTIVE | OUTPATIENT
Start: 2021-08-04 | End: 2021-08-04

## 2021-08-03 RX ORDER — PROCHLORPERAZINE EDISYLATE 5 MG/ML
5 INJECTION INTRAMUSCULAR; INTRAVENOUS EVERY 6 HOURS PRN
Status: DISCONTINUED | OUTPATIENT
Start: 2021-08-03 | End: 2021-08-06 | Stop reason: HOSPADM

## 2021-08-03 RX ORDER — OXYCODONE HYDROCHLORIDE 5 MG/1
5 TABLET ORAL EVERY 4 HOURS PRN
Status: DISPENSED | OUTPATIENT
Start: 2021-08-03 | End: 2021-08-04

## 2021-08-03 RX ORDER — PROCHLORPERAZINE EDISYLATE 5 MG/ML
5 INJECTION INTRAMUSCULAR; INTRAVENOUS EVERY 6 HOURS PRN
Status: DISCONTINUED | OUTPATIENT
Start: 2021-08-03 | End: 2021-08-03 | Stop reason: SDUPTHER

## 2021-08-03 RX ORDER — OXYCODONE AND ACETAMINOPHEN 10; 325 MG/1; MG/1
1 TABLET ORAL EVERY 4 HOURS PRN
Status: DISCONTINUED | OUTPATIENT
Start: 2021-08-03 | End: 2021-08-06 | Stop reason: HOSPADM

## 2021-08-03 RX ORDER — ACETAMINOPHEN 10 MG/ML
INJECTION, SOLUTION INTRAVENOUS
Status: DISCONTINUED | OUTPATIENT
Start: 2021-08-03 | End: 2021-08-03

## 2021-08-03 RX ORDER — ONDANSETRON 2 MG/ML
4 INJECTION INTRAMUSCULAR; INTRAVENOUS EVERY 6 HOURS PRN
Status: ACTIVE | OUTPATIENT
Start: 2021-08-03 | End: 2021-08-04

## 2021-08-03 RX ORDER — AZITHROMYCIN 100 MG/ML
INJECTION, POWDER, LYOPHILIZED, FOR SOLUTION INTRAVENOUS
Status: DISCONTINUED | OUTPATIENT
Start: 2021-08-03 | End: 2021-08-03

## 2021-08-03 RX ORDER — SODIUM CHLORIDE 0.9 % (FLUSH) 0.9 %
10 SYRINGE (ML) INJECTION
Status: DISCONTINUED | OUTPATIENT
Start: 2021-08-03 | End: 2021-08-06 | Stop reason: HOSPADM

## 2021-08-03 RX ORDER — OXYTOCIN 10 [USP'U]/ML
INJECTION, SOLUTION INTRAMUSCULAR; INTRAVENOUS
Status: DISCONTINUED | OUTPATIENT
Start: 2021-08-03 | End: 2021-08-03

## 2021-08-03 RX ORDER — DEXAMETHASONE SODIUM PHOSPHATE 4 MG/ML
INJECTION, SOLUTION INTRA-ARTICULAR; INTRALESIONAL; INTRAMUSCULAR; INTRAVENOUS; SOFT TISSUE
Status: DISCONTINUED | OUTPATIENT
Start: 2021-08-03 | End: 2021-08-03

## 2021-08-03 RX ORDER — DIPHENHYDRAMINE HYDROCHLORIDE 50 MG/ML
12.5 INJECTION INTRAMUSCULAR; INTRAVENOUS EVERY 4 HOURS PRN
Status: DISCONTINUED | OUTPATIENT
Start: 2021-08-03 | End: 2021-08-06 | Stop reason: HOSPADM

## 2021-08-03 RX ORDER — ONDANSETRON 8 MG/1
8 TABLET, ORALLY DISINTEGRATING ORAL EVERY 8 HOURS PRN
Status: DISCONTINUED | OUTPATIENT
Start: 2021-08-03 | End: 2021-08-06 | Stop reason: HOSPADM

## 2021-08-03 RX ORDER — DOCUSATE SODIUM 100 MG/1
200 CAPSULE, LIQUID FILLED ORAL 2 TIMES DAILY
Status: DISCONTINUED | OUTPATIENT
Start: 2021-08-03 | End: 2021-08-06 | Stop reason: HOSPADM

## 2021-08-03 RX ADMIN — DEXTROSE 3 MILLION UNITS: 50 INJECTION, SOLUTION INTRAVENOUS at 12:08

## 2021-08-03 RX ADMIN — Medication 10 ML/HR: at 01:08

## 2021-08-03 RX ADMIN — DEXAMETHASONE SODIUM PHOSPHATE 4 MG: 4 INJECTION, SOLUTION INTRAMUSCULAR; INTRAVENOUS at 05:08

## 2021-08-03 RX ADMIN — CHLOROPROCAINE HYDROCHLORIDE 5 ML: 20 INJECTION, SOLUTION EPIDURAL; INFILTRATION; INTRACAUDAL; PERINEURAL at 05:08

## 2021-08-03 RX ADMIN — SODIUM CITRATE AND CITRIC ACID MONOHYDRATE 30 ML: 500; 334 SOLUTION ORAL at 04:08

## 2021-08-03 RX ADMIN — AZITHROMYCIN MONOHYDRATE 500 MG: 500 INJECTION, POWDER, LYOPHILIZED, FOR SOLUTION INTRAVENOUS at 05:08

## 2021-08-03 RX ADMIN — ONDANSETRON 4 MG: 2 INJECTION INTRAMUSCULAR; INTRAVENOUS at 06:08

## 2021-08-03 RX ADMIN — DEXTROSE 3 MILLION UNITS: 50 INJECTION, SOLUTION INTRAVENOUS at 09:08

## 2021-08-03 RX ADMIN — OXYCODONE HYDROCHLORIDE 10 MG: 5 TABLET ORAL at 07:08

## 2021-08-03 RX ADMIN — DEXTROSE 3 MILLION UNITS: 50 INJECTION, SOLUTION INTRAVENOUS at 04:08

## 2021-08-03 RX ADMIN — ACETAMINOPHEN 1000 MG: 10 INJECTION INTRAVENOUS at 05:08

## 2021-08-03 RX ADMIN — DEXTROSE 2 G: 50 INJECTION, SOLUTION INTRAVENOUS at 05:08

## 2021-08-03 RX ADMIN — MORPHINE SULFATE 1 MG: 0.5 INJECTION EPIDURAL; INTRATHECAL; INTRAVENOUS at 06:08

## 2021-08-03 RX ADMIN — FENTANYL CITRATE 100 MCG: 50 INJECTION, SOLUTION INTRAMUSCULAR; INTRAVENOUS at 05:08

## 2021-08-03 RX ADMIN — OXYTOCIN 6 UNITS: 10 INJECTION, SOLUTION INTRAMUSCULAR; INTRAVENOUS at 05:08

## 2021-08-03 RX ADMIN — FENTANYL CITRATE 100 MCG: 50 INJECTION, SOLUTION INTRAMUSCULAR; INTRAVENOUS at 01:08

## 2021-08-03 RX ADMIN — KETOROLAC TROMETHAMINE 30 MG: 30 INJECTION, SOLUTION INTRAMUSCULAR; INTRAVENOUS at 07:08

## 2021-08-03 RX ADMIN — FAMOTIDINE 20 MG: 10 INJECTION INTRAVENOUS at 04:08

## 2021-08-03 RX ADMIN — Medication 100 MCG: at 05:08

## 2021-08-03 RX ADMIN — ONDANSETRON 8 MG: 8 TABLET, ORALLY DISINTEGRATING ORAL at 05:08

## 2021-08-03 RX ADMIN — AZITHROMYCIN MONOHYDRATE 500 MG: 500 INJECTION, POWDER, LYOPHILIZED, FOR SOLUTION INTRAVENOUS at 04:08

## 2021-08-03 RX ADMIN — DEXTROSE 3 MILLION UNITS: 50 INJECTION, SOLUTION INTRAVENOUS at 01:08

## 2021-08-03 RX ADMIN — Medication 5 ML: at 01:08

## 2021-08-04 LAB
BASOPHILS # BLD AUTO: 0.04 K/UL (ref 0–0.2)
BASOPHILS NFR BLD: 0.2 % (ref 0–1.9)
DIFFERENTIAL METHOD: ABNORMAL
EOSINOPHIL # BLD AUTO: 0 K/UL (ref 0–0.5)
EOSINOPHIL NFR BLD: 0.1 % (ref 0–8)
ERYTHROCYTE [DISTWIDTH] IN BLOOD BY AUTOMATED COUNT: 13.4 % (ref 11.5–14.5)
HCT VFR BLD AUTO: 32.9 % (ref 37–48.5)
HGB BLD-MCNC: 10.9 G/DL (ref 12–16)
IMM GRANULOCYTES # BLD AUTO: 0.1 K/UL (ref 0–0.04)
IMM GRANULOCYTES NFR BLD AUTO: 0.5 % (ref 0–0.5)
LYMPHOCYTES # BLD AUTO: 1.7 K/UL (ref 1–4.8)
LYMPHOCYTES NFR BLD: 8.7 % (ref 18–48)
MCH RBC QN AUTO: 31.1 PG (ref 27–31)
MCHC RBC AUTO-ENTMCNC: 33.1 G/DL (ref 32–36)
MCV RBC AUTO: 94 FL (ref 82–98)
MONOCYTES # BLD AUTO: 0.9 K/UL (ref 0.3–1)
MONOCYTES NFR BLD: 4.5 % (ref 4–15)
NEUTROPHILS # BLD AUTO: 17 K/UL (ref 1.8–7.7)
NEUTROPHILS NFR BLD: 86 % (ref 38–73)
NRBC BLD-RTO: 0 /100 WBC
PLATELET # BLD AUTO: 165 K/UL (ref 150–450)
PMV BLD AUTO: 12.8 FL (ref 9.2–12.9)
RBC # BLD AUTO: 3.51 M/UL (ref 4–5.4)
WBC # BLD AUTO: 19.72 K/UL (ref 3.9–12.7)

## 2021-08-04 PROCEDURE — 99024 POSTOP FOLLOW-UP VISIT: CPT | Mod: ,,, | Performed by: OBSTETRICS & GYNECOLOGY

## 2021-08-04 PROCEDURE — 25000003 PHARM REV CODE 250: Performed by: STUDENT IN AN ORGANIZED HEALTH CARE EDUCATION/TRAINING PROGRAM

## 2021-08-04 PROCEDURE — 99024 PR POST-OP FOLLOW-UP VISIT: ICD-10-PCS | Mod: ,,, | Performed by: OBSTETRICS & GYNECOLOGY

## 2021-08-04 PROCEDURE — 85025 COMPLETE CBC W/AUTO DIFF WBC: CPT | Performed by: STUDENT IN AN ORGANIZED HEALTH CARE EDUCATION/TRAINING PROGRAM

## 2021-08-04 PROCEDURE — 63600175 PHARM REV CODE 636 W HCPCS: Performed by: STUDENT IN AN ORGANIZED HEALTH CARE EDUCATION/TRAINING PROGRAM

## 2021-08-04 PROCEDURE — 11000001 HC ACUTE MED/SURG PRIVATE ROOM

## 2021-08-04 PROCEDURE — 36415 COLL VENOUS BLD VENIPUNCTURE: CPT | Performed by: STUDENT IN AN ORGANIZED HEALTH CARE EDUCATION/TRAINING PROGRAM

## 2021-08-04 PROCEDURE — 25000003 PHARM REV CODE 250: Performed by: OBSTETRICS & GYNECOLOGY

## 2021-08-04 RX ORDER — IBUPROFEN 600 MG/1
600 TABLET ORAL EVERY 6 HOURS
Status: DISCONTINUED | OUTPATIENT
Start: 2021-08-04 | End: 2021-08-06 | Stop reason: HOSPADM

## 2021-08-04 RX ADMIN — OXYCODONE 5 MG: 5 TABLET ORAL at 05:08

## 2021-08-04 RX ADMIN — ACETAMINOPHEN 650 MG: 325 TABLET ORAL at 09:08

## 2021-08-04 RX ADMIN — VENLAFAXINE HYDROCHLORIDE 75 MG: 75 CAPSULE, EXTENDED RELEASE ORAL at 09:08

## 2021-08-04 RX ADMIN — DOCUSATE SODIUM 200 MG: 100 CAPSULE, LIQUID FILLED ORAL at 09:08

## 2021-08-04 RX ADMIN — OXYCODONE 5 MG: 5 TABLET ORAL at 11:08

## 2021-08-04 RX ADMIN — ACETAMINOPHEN 650 MG: 325 TABLET ORAL at 03:08

## 2021-08-04 RX ADMIN — DOCUSATE SODIUM 200 MG: 100 CAPSULE, LIQUID FILLED ORAL at 10:08

## 2021-08-04 RX ADMIN — IBUPROFEN 600 MG: 600 TABLET ORAL at 05:08

## 2021-08-04 RX ADMIN — ACETAMINOPHEN 650 MG: 325 TABLET ORAL at 05:08

## 2021-08-04 RX ADMIN — KETOROLAC TROMETHAMINE 30 MG: 30 INJECTION, SOLUTION INTRAMUSCULAR; INTRAVENOUS at 09:08

## 2021-08-04 RX ADMIN — KETOROLAC TROMETHAMINE 30 MG: 30 INJECTION, SOLUTION INTRAMUSCULAR; INTRAVENOUS at 03:08

## 2021-08-04 RX ADMIN — SIMETHICONE CHEW TAB 80 MG 80 MG: 80 TABLET ORAL at 03:08

## 2021-08-05 PROCEDURE — 11000001 HC ACUTE MED/SURG PRIVATE ROOM

## 2021-08-05 PROCEDURE — 25000003 PHARM REV CODE 250: Performed by: STUDENT IN AN ORGANIZED HEALTH CARE EDUCATION/TRAINING PROGRAM

## 2021-08-05 PROCEDURE — 25000003 PHARM REV CODE 250: Performed by: OBSTETRICS & GYNECOLOGY

## 2021-08-05 PROCEDURE — 99024 PR POST-OP FOLLOW-UP VISIT: ICD-10-PCS | Mod: ,,, | Performed by: OBSTETRICS & GYNECOLOGY

## 2021-08-05 PROCEDURE — 99024 POSTOP FOLLOW-UP VISIT: CPT | Mod: ,,, | Performed by: OBSTETRICS & GYNECOLOGY

## 2021-08-05 RX ADMIN — DOCUSATE SODIUM 200 MG: 100 CAPSULE, LIQUID FILLED ORAL at 10:08

## 2021-08-05 RX ADMIN — OXYCODONE HYDROCHLORIDE AND ACETAMINOPHEN 1 TABLET: 5; 325 TABLET ORAL at 12:08

## 2021-08-05 RX ADMIN — PRENATAL VIT W/ FE FUMARATE-FA TAB 27-0.8 MG 1 TABLET: 27-0.8 TAB at 09:08

## 2021-08-05 RX ADMIN — IBUPROFEN 600 MG: 600 TABLET ORAL at 12:08

## 2021-08-05 RX ADMIN — DOCUSATE SODIUM 200 MG: 100 CAPSULE, LIQUID FILLED ORAL at 09:08

## 2021-08-05 RX ADMIN — IBUPROFEN 600 MG: 600 TABLET ORAL at 05:08

## 2021-08-05 RX ADMIN — IBUPROFEN 600 MG: 600 TABLET ORAL at 06:08

## 2021-08-05 RX ADMIN — IBUPROFEN 600 MG: 600 TABLET ORAL at 11:08

## 2021-08-05 RX ADMIN — CHLORHEXIDINE GLUCONATE 0.12% ORAL RINSE 10 ML: 1.2 LIQUID ORAL at 10:08

## 2021-08-05 RX ADMIN — VENLAFAXINE HYDROCHLORIDE 75 MG: 75 CAPSULE, EXTENDED RELEASE ORAL at 09:08

## 2021-08-06 VITALS
TEMPERATURE: 98 F | OXYGEN SATURATION: 96 % | RESPIRATION RATE: 16 BRPM | SYSTOLIC BLOOD PRESSURE: 124 MMHG | HEART RATE: 81 BPM | HEIGHT: 61 IN | BODY MASS INDEX: 32.3 KG/M2 | DIASTOLIC BLOOD PRESSURE: 77 MMHG | WEIGHT: 171.06 LBS

## 2021-08-06 PROCEDURE — 99024 POSTOP FOLLOW-UP VISIT: CPT | Mod: ,,, | Performed by: OBSTETRICS & GYNECOLOGY

## 2021-08-06 PROCEDURE — 25000003 PHARM REV CODE 250: Performed by: OBSTETRICS & GYNECOLOGY

## 2021-08-06 PROCEDURE — 99024 PR POST-OP FOLLOW-UP VISIT: ICD-10-PCS | Mod: ,,, | Performed by: OBSTETRICS & GYNECOLOGY

## 2021-08-06 PROCEDURE — 25000003 PHARM REV CODE 250: Performed by: STUDENT IN AN ORGANIZED HEALTH CARE EDUCATION/TRAINING PROGRAM

## 2021-08-06 RX ORDER — OXYCODONE AND ACETAMINOPHEN 5; 325 MG/1; MG/1
1 TABLET ORAL EVERY 4 HOURS PRN
Qty: 20 TABLET | Refills: 0 | Status: SHIPPED | OUTPATIENT
Start: 2021-08-06 | End: 2021-09-29

## 2021-08-06 RX ORDER — IBUPROFEN 600 MG/1
600 TABLET ORAL EVERY 6 HOURS
Qty: 30 TABLET | Refills: 0 | Status: SHIPPED | OUTPATIENT
Start: 2021-08-06 | End: 2021-09-29

## 2021-08-06 RX ADMIN — PRENATAL VIT W/ FE FUMARATE-FA TAB 27-0.8 MG 1 TABLET: 27-0.8 TAB at 08:08

## 2021-08-06 RX ADMIN — VENLAFAXINE HYDROCHLORIDE 75 MG: 75 CAPSULE, EXTENDED RELEASE ORAL at 08:08

## 2021-08-06 RX ADMIN — IBUPROFEN 600 MG: 600 TABLET ORAL at 06:08

## 2021-08-06 RX ADMIN — IBUPROFEN 600 MG: 600 TABLET ORAL at 12:08

## 2021-08-06 RX ADMIN — DOCUSATE SODIUM 200 MG: 100 CAPSULE, LIQUID FILLED ORAL at 08:08

## 2021-08-09 ENCOUNTER — PATIENT MESSAGE (OUTPATIENT)
Dept: OBSTETRICS AND GYNECOLOGY | Facility: CLINIC | Age: 31
End: 2021-08-09

## 2021-08-11 ENCOUNTER — TELEPHONE (OUTPATIENT)
Dept: OBSTETRICS AND GYNECOLOGY | Facility: CLINIC | Age: 31
End: 2021-08-11

## 2021-08-11 ENCOUNTER — HOSPITAL ENCOUNTER (EMERGENCY)
Facility: OTHER | Age: 31
Discharge: HOME | End: 2021-08-11
Attending: OBSTETRICS & GYNECOLOGY
Payer: COMMERCIAL

## 2021-08-11 VITALS
HEART RATE: 79 BPM | OXYGEN SATURATION: 100 % | TEMPERATURE: 99 F | RESPIRATION RATE: 20 BRPM | HEIGHT: 61 IN | WEIGHT: 156 LBS | DIASTOLIC BLOOD PRESSURE: 82 MMHG | SYSTOLIC BLOOD PRESSURE: 115 MMHG | BODY MASS INDEX: 29.45 KG/M2

## 2021-08-11 PROBLEM — O14.10 SEVERE PREECLAMPSIA: Status: ACTIVE | Noted: 2021-08-11

## 2021-08-11 LAB
ALBUMIN SERPL BCP-MCNC: 3.1 G/DL (ref 3.5–5.2)
ALP SERPL-CCNC: 130 U/L (ref 55–135)
ALT SERPL W/O P-5'-P-CCNC: 45 U/L (ref 10–44)
ANION GAP SERPL CALC-SCNC: 10 MMOL/L (ref 8–16)
AST SERPL-CCNC: 23 U/L (ref 10–40)
BASOPHILS # BLD AUTO: 0.07 K/UL (ref 0–0.2)
BASOPHILS NFR BLD: 0.7 % (ref 0–1.9)
BILIRUB SERPL-MCNC: 0.3 MG/DL (ref 0.1–1)
BUN SERPL-MCNC: 13 MG/DL (ref 6–20)
CALCIUM SERPL-MCNC: 9 MG/DL (ref 8.7–10.5)
CHLORIDE SERPL-SCNC: 107 MMOL/L (ref 95–110)
CO2 SERPL-SCNC: 22 MMOL/L (ref 23–29)
CREAT SERPL-MCNC: 0.8 MG/DL (ref 0.5–1.4)
DIFFERENTIAL METHOD: ABNORMAL
EOSINOPHIL # BLD AUTO: 0.3 K/UL (ref 0–0.5)
EOSINOPHIL NFR BLD: 3.6 % (ref 0–8)
ERYTHROCYTE [DISTWIDTH] IN BLOOD BY AUTOMATED COUNT: 14.2 % (ref 11.5–14.5)
EST. GFR  (AFRICAN AMERICAN): >60 ML/MIN/1.73 M^2
EST. GFR  (NON AFRICAN AMERICAN): >60 ML/MIN/1.73 M^2
GLUCOSE SERPL-MCNC: 91 MG/DL (ref 70–110)
HCT VFR BLD AUTO: 37.6 % (ref 37–48.5)
HGB BLD-MCNC: 12.4 G/DL (ref 12–16)
IMM GRANULOCYTES # BLD AUTO: 0.06 K/UL (ref 0–0.04)
IMM GRANULOCYTES NFR BLD AUTO: 0.6 % (ref 0–0.5)
LYMPHOCYTES # BLD AUTO: 2.4 K/UL (ref 1–4.8)
LYMPHOCYTES NFR BLD: 24.9 % (ref 18–48)
MCH RBC QN AUTO: 30.8 PG (ref 27–31)
MCHC RBC AUTO-ENTMCNC: 33 G/DL (ref 32–36)
MCV RBC AUTO: 93 FL (ref 82–98)
MONOCYTES # BLD AUTO: 0.8 K/UL (ref 0.3–1)
MONOCYTES NFR BLD: 8.8 % (ref 4–15)
NEUTROPHILS # BLD AUTO: 5.9 K/UL (ref 1.8–7.7)
NEUTROPHILS NFR BLD: 61.4 % (ref 38–73)
NRBC BLD-RTO: 0 /100 WBC
PLATELET # BLD AUTO: 354 K/UL (ref 150–450)
PMV BLD AUTO: 9.7 FL (ref 9.2–12.9)
POTASSIUM SERPL-SCNC: 4.1 MMOL/L (ref 3.5–5.1)
PROT SERPL-MCNC: 6.7 G/DL (ref 6–8.4)
RBC # BLD AUTO: 4.03 M/UL (ref 4–5.4)
SODIUM SERPL-SCNC: 139 MMOL/L (ref 136–145)
WBC # BLD AUTO: 9.55 K/UL (ref 3.9–12.7)

## 2021-08-11 PROCEDURE — 25000003 PHARM REV CODE 250: Performed by: STUDENT IN AN ORGANIZED HEALTH CARE EDUCATION/TRAINING PROGRAM

## 2021-08-11 PROCEDURE — 99284 EMERGENCY DEPT VISIT MOD MDM: CPT | Mod: 24,,, | Performed by: OBSTETRICS & GYNECOLOGY

## 2021-08-11 PROCEDURE — 99284 PR EMERGENCY DEPT VISIT,LEVEL IV: ICD-10-PCS | Mod: 24,,, | Performed by: OBSTETRICS & GYNECOLOGY

## 2021-08-11 PROCEDURE — 99283 EMERGENCY DEPT VISIT LOW MDM: CPT

## 2021-08-11 PROCEDURE — 80053 COMPREHEN METABOLIC PANEL: CPT | Performed by: STUDENT IN AN ORGANIZED HEALTH CARE EDUCATION/TRAINING PROGRAM

## 2021-08-11 PROCEDURE — 85025 COMPLETE CBC W/AUTO DIFF WBC: CPT | Performed by: STUDENT IN AN ORGANIZED HEALTH CARE EDUCATION/TRAINING PROGRAM

## 2021-08-11 RX ORDER — DOCUSATE SODIUM 100 MG/1
200 CAPSULE, LIQUID FILLED ORAL 2 TIMES DAILY PRN
Status: CANCELLED | OUTPATIENT
Start: 2021-08-11

## 2021-08-11 RX ORDER — SODIUM CHLORIDE 0.9 % (FLUSH) 0.9 %
10 SYRINGE (ML) INJECTION
Status: DISCONTINUED | OUTPATIENT
Start: 2021-08-11 | End: 2021-08-11

## 2021-08-11 RX ORDER — NIFEDIPINE 30 MG/1
30 TABLET, EXTENDED RELEASE ORAL DAILY
Status: DISCONTINUED | OUTPATIENT
Start: 2021-08-11 | End: 2021-08-11 | Stop reason: SDUPTHER

## 2021-08-11 RX ORDER — NIFEDIPINE 30 MG/1
30 TABLET, EXTENDED RELEASE ORAL DAILY
Qty: 30 TABLET | Refills: 0 | Status: SHIPPED | OUTPATIENT
Start: 2021-09-06 | End: 2021-09-29

## 2021-08-11 RX ORDER — ONDANSETRON 8 MG/1
8 TABLET, ORALLY DISINTEGRATING ORAL EVERY 8 HOURS PRN
Status: DISCONTINUED | OUTPATIENT
Start: 2021-08-11 | End: 2021-08-11

## 2021-08-11 RX ORDER — PROCHLORPERAZINE EDISYLATE 5 MG/ML
5 INJECTION INTRAMUSCULAR; INTRAVENOUS EVERY 6 HOURS PRN
Status: DISCONTINUED | OUTPATIENT
Start: 2021-08-11 | End: 2021-08-11

## 2021-08-11 RX ORDER — IBUPROFEN 600 MG/1
600 TABLET ORAL EVERY 6 HOURS PRN
Status: CANCELLED | OUTPATIENT
Start: 2021-08-11

## 2021-08-11 RX ORDER — NIFEDIPINE 10 MG/1
10 CAPSULE ORAL ONCE
Status: COMPLETED | OUTPATIENT
Start: 2021-08-11 | End: 2021-08-11

## 2021-08-11 RX ORDER — OXYCODONE AND ACETAMINOPHEN 5; 325 MG/1; MG/1
1 TABLET ORAL EVERY 4 HOURS PRN
Status: CANCELLED | OUTPATIENT
Start: 2021-08-11

## 2021-08-11 RX ORDER — NIFEDIPINE 30 MG/1
30 TABLET, EXTENDED RELEASE ORAL DAILY
Status: DISCONTINUED | OUTPATIENT
Start: 2021-08-12 | End: 2021-08-11

## 2021-08-11 RX ADMIN — NIFEDIPINE 30 MG: 30 TABLET, FILM COATED, EXTENDED RELEASE ORAL at 07:08

## 2021-08-11 RX ADMIN — NIFEDIPINE 10 MG: 10 CAPSULE ORAL at 06:08

## 2021-08-12 ENCOUNTER — POSTPARTUM VISIT (OUTPATIENT)
Dept: OBSTETRICS AND GYNECOLOGY | Facility: CLINIC | Age: 31
End: 2021-08-12
Payer: COMMERCIAL

## 2021-08-12 ENCOUNTER — PATIENT MESSAGE (OUTPATIENT)
Dept: OBSTETRICS AND GYNECOLOGY | Facility: CLINIC | Age: 31
End: 2021-08-12

## 2021-08-12 VITALS — HEIGHT: 61 IN | BODY MASS INDEX: 29.18 KG/M2 | WEIGHT: 154.56 LBS

## 2021-08-12 DIAGNOSIS — Z01.30 BLOOD PRESSURE CHECK: Primary | ICD-10-CM

## 2021-08-12 PROCEDURE — 99999 PR PBB SHADOW E&M-EST. PATIENT-LVL III: CPT | Mod: PBBFAC,,, | Performed by: OBSTETRICS & GYNECOLOGY

## 2021-08-12 PROCEDURE — 1111F DSCHRG MED/CURRENT MED MERGE: CPT | Mod: CPTII,S$GLB,, | Performed by: OBSTETRICS & GYNECOLOGY

## 2021-08-12 PROCEDURE — 99213 OFFICE O/P EST LOW 20 MIN: CPT | Mod: 24,S$GLB,, | Performed by: OBSTETRICS & GYNECOLOGY

## 2021-08-12 PROCEDURE — 99213 PR OFFICE/OUTPT VISIT, EST, LEVL III, 20-29 MIN: ICD-10-PCS | Mod: 24,S$GLB,, | Performed by: OBSTETRICS & GYNECOLOGY

## 2021-08-12 PROCEDURE — 99999 PR PBB SHADOW E&M-EST. PATIENT-LVL III: ICD-10-PCS | Mod: PBBFAC,,, | Performed by: OBSTETRICS & GYNECOLOGY

## 2021-08-12 PROCEDURE — 1111F PR DISCHARGE MEDS RECONCILED W/ CURRENT OUTPATIENT MED LIST: ICD-10-PCS | Mod: CPTII,S$GLB,, | Performed by: OBSTETRICS & GYNECOLOGY

## 2021-08-23 ENCOUNTER — PATIENT MESSAGE (OUTPATIENT)
Dept: OBSTETRICS AND GYNECOLOGY | Facility: CLINIC | Age: 31
End: 2021-08-23

## 2021-09-08 ENCOUNTER — TELEPHONE (OUTPATIENT)
Dept: OBSTETRICS AND GYNECOLOGY | Facility: CLINIC | Age: 31
End: 2021-09-08

## 2021-09-15 ENCOUNTER — OFFICE VISIT (OUTPATIENT)
Dept: OBSTETRICS AND GYNECOLOGY | Facility: CLINIC | Age: 31
End: 2021-09-15
Payer: COMMERCIAL

## 2021-09-15 VITALS
HEIGHT: 61 IN | WEIGHT: 154.13 LBS | SYSTOLIC BLOOD PRESSURE: 116 MMHG | DIASTOLIC BLOOD PRESSURE: 76 MMHG | BODY MASS INDEX: 29.1 KG/M2

## 2021-09-15 DIAGNOSIS — Z30.014 ENCOUNTER FOR INITIAL PRESCRIPTION OF INTRAUTERINE CONTRACEPTIVE DEVICE (IUD): ICD-10-CM

## 2021-09-15 PROCEDURE — 3078F DIAST BP <80 MM HG: CPT | Mod: CPTII,S$GLB,, | Performed by: OBSTETRICS & GYNECOLOGY

## 2021-09-15 PROCEDURE — 1160F PR REVIEW ALL MEDS BY PRESCRIBER/CLIN PHARMACIST DOCUMENTED: ICD-10-PCS | Mod: CPTII,S$GLB,, | Performed by: OBSTETRICS & GYNECOLOGY

## 2021-09-15 PROCEDURE — 3078F PR MOST RECENT DIASTOLIC BLOOD PRESSURE < 80 MM HG: ICD-10-PCS | Mod: CPTII,S$GLB,, | Performed by: OBSTETRICS & GYNECOLOGY

## 2021-09-15 PROCEDURE — 99999 PR PBB SHADOW E&M-EST. PATIENT-LVL III: ICD-10-PCS | Mod: PBBFAC,,, | Performed by: OBSTETRICS & GYNECOLOGY

## 2021-09-15 PROCEDURE — 1160F RVW MEDS BY RX/DR IN RCRD: CPT | Mod: CPTII,S$GLB,, | Performed by: OBSTETRICS & GYNECOLOGY

## 2021-09-15 PROCEDURE — 3008F PR BODY MASS INDEX (BMI) DOCUMENTED: ICD-10-PCS | Mod: CPTII,S$GLB,, | Performed by: OBSTETRICS & GYNECOLOGY

## 2021-09-15 PROCEDURE — 3044F HG A1C LEVEL LT 7.0%: CPT | Mod: CPTII,S$GLB,, | Performed by: OBSTETRICS & GYNECOLOGY

## 2021-09-15 PROCEDURE — 3008F BODY MASS INDEX DOCD: CPT | Mod: CPTII,S$GLB,, | Performed by: OBSTETRICS & GYNECOLOGY

## 2021-09-15 PROCEDURE — 99999 PR PBB SHADOW E&M-EST. PATIENT-LVL III: CPT | Mod: PBBFAC,,, | Performed by: OBSTETRICS & GYNECOLOGY

## 2021-09-15 PROCEDURE — 0503F PR POSTPARTUM CARE VISIT: ICD-10-PCS | Mod: CPTII,S$GLB,, | Performed by: OBSTETRICS & GYNECOLOGY

## 2021-09-15 PROCEDURE — 1159F MED LIST DOCD IN RCRD: CPT | Mod: CPTII,S$GLB,, | Performed by: OBSTETRICS & GYNECOLOGY

## 2021-09-15 PROCEDURE — 0503F POSTPARTUM CARE VISIT: CPT | Mod: CPTII,S$GLB,, | Performed by: OBSTETRICS & GYNECOLOGY

## 2021-09-15 PROCEDURE — 3074F PR MOST RECENT SYSTOLIC BLOOD PRESSURE < 130 MM HG: ICD-10-PCS | Mod: CPTII,S$GLB,, | Performed by: OBSTETRICS & GYNECOLOGY

## 2021-09-15 PROCEDURE — 1159F PR MEDICATION LIST DOCUMENTED IN MEDICAL RECORD: ICD-10-PCS | Mod: CPTII,S$GLB,, | Performed by: OBSTETRICS & GYNECOLOGY

## 2021-09-15 PROCEDURE — 3074F SYST BP LT 130 MM HG: CPT | Mod: CPTII,S$GLB,, | Performed by: OBSTETRICS & GYNECOLOGY

## 2021-09-15 PROCEDURE — 3044F PR MOST RECENT HEMOGLOBIN A1C LEVEL <7.0%: ICD-10-PCS | Mod: CPTII,S$GLB,, | Performed by: OBSTETRICS & GYNECOLOGY

## 2021-09-29 ENCOUNTER — PROCEDURE VISIT (OUTPATIENT)
Dept: OBSTETRICS AND GYNECOLOGY | Facility: CLINIC | Age: 31
End: 2021-09-29
Payer: COMMERCIAL

## 2021-09-29 VITALS
SYSTOLIC BLOOD PRESSURE: 112 MMHG | DIASTOLIC BLOOD PRESSURE: 70 MMHG | BODY MASS INDEX: 29.35 KG/M2 | HEIGHT: 61 IN | WEIGHT: 155.44 LBS

## 2021-09-29 DIAGNOSIS — Z30.430 ENCOUNTER FOR IUD INSERTION: Primary | ICD-10-CM

## 2021-09-29 PROCEDURE — 58300 PR INSERT INTRAUTERINE DEVICE: ICD-10-PCS | Mod: 53,S$GLB,, | Performed by: OBSTETRICS & GYNECOLOGY

## 2021-09-29 PROCEDURE — 58300 INSERT INTRAUTERINE DEVICE: CPT | Mod: 53,S$GLB,, | Performed by: OBSTETRICS & GYNECOLOGY

## 2021-10-05 ENCOUNTER — PATIENT MESSAGE (OUTPATIENT)
Dept: OBSTETRICS AND GYNECOLOGY | Facility: CLINIC | Age: 31
End: 2021-10-05

## 2021-10-06 RX ORDER — ACETAMINOPHEN AND CODEINE PHOSPHATE 120; 12 MG/5ML; MG/5ML
1 SOLUTION ORAL DAILY
Qty: 84 TABLET | Refills: 3 | Status: SHIPPED | OUTPATIENT
Start: 2021-10-06 | End: 2022-02-22

## 2021-10-18 ENCOUNTER — IMMUNIZATION (OUTPATIENT)
Dept: INTERNAL MEDICINE | Facility: CLINIC | Age: 31
End: 2021-10-18
Payer: COMMERCIAL

## 2021-10-18 PROCEDURE — 90471 IMMUNIZATION ADMIN: CPT | Mod: S$GLB,,, | Performed by: INTERNAL MEDICINE

## 2021-10-18 PROCEDURE — 90686 IIV4 VACC NO PRSV 0.5 ML IM: CPT | Mod: S$GLB,,, | Performed by: INTERNAL MEDICINE

## 2021-10-18 PROCEDURE — 90471 FLU VACCINE (QUAD) GREATER THAN OR EQUAL TO 3YO PRESERVATIVE FREE IM: ICD-10-PCS | Mod: S$GLB,,, | Performed by: INTERNAL MEDICINE

## 2021-10-18 PROCEDURE — 90686 FLU VACCINE (QUAD) GREATER THAN OR EQUAL TO 3YO PRESERVATIVE FREE IM: ICD-10-PCS | Mod: S$GLB,,, | Performed by: INTERNAL MEDICINE

## 2021-11-08 PROBLEM — O14.93 PRE-ECLAMPSIA IN THIRD TRIMESTER: Status: RESOLVED | Noted: 2021-08-03 | Resolved: 2021-11-08

## 2021-11-22 ENCOUNTER — PATIENT MESSAGE (OUTPATIENT)
Dept: OBSTETRICS AND GYNECOLOGY | Facility: CLINIC | Age: 31
End: 2021-11-22
Payer: COMMERCIAL

## 2021-12-22 ENCOUNTER — LAB VISIT (OUTPATIENT)
Dept: PRIMARY CARE CLINIC | Facility: OTHER | Age: 31
End: 2021-12-22

## 2021-12-22 DIAGNOSIS — R51.9 NONINTRACTABLE HEADACHE, UNSPECIFIED CHRONICITY PATTERN, UNSPECIFIED HEADACHE TYPE: Primary | ICD-10-CM

## 2021-12-22 LAB
CTP QC/QA: YES
SARS-COV-2 AG RESP QL IA.RAPID: NEGATIVE

## 2021-12-23 ENCOUNTER — IMMUNIZATION (OUTPATIENT)
Dept: INTERNAL MEDICINE | Facility: CLINIC | Age: 31
End: 2021-12-23
Payer: COMMERCIAL

## 2021-12-23 DIAGNOSIS — Z23 NEED FOR VACCINATION: Primary | ICD-10-CM

## 2021-12-23 PROCEDURE — 0004A COVID-19, MRNA, LNP-S, PF, 30 MCG/0.3 ML DOSE VACCINE: CPT | Mod: PBBFAC | Performed by: INTERNAL MEDICINE

## 2022-01-28 ENCOUNTER — LAB VISIT (OUTPATIENT)
Dept: PRIMARY CARE CLINIC | Facility: CLINIC | Age: 32
End: 2022-01-28
Payer: COMMERCIAL

## 2022-01-28 DIAGNOSIS — R50.9 FEVER, UNSPECIFIED FEVER CAUSE: Primary | ICD-10-CM

## 2022-01-28 DIAGNOSIS — R50.9 FEVER, UNSPECIFIED FEVER CAUSE: ICD-10-CM

## 2022-01-28 LAB
CTP QC/QA: YES
SARS-COV-2 AG RESP QL IA.RAPID: POSITIVE

## 2022-01-28 PROCEDURE — 87811 SARS-COV-2 COVID19 W/OPTIC: CPT

## 2022-01-29 ENCOUNTER — PATIENT MESSAGE (OUTPATIENT)
Dept: PRIMARY CARE CLINIC | Facility: CLINIC | Age: 32
End: 2022-01-29
Payer: COMMERCIAL

## 2022-02-21 ENCOUNTER — PATIENT MESSAGE (OUTPATIENT)
Dept: OBSTETRICS AND GYNECOLOGY | Facility: CLINIC | Age: 32
End: 2022-02-21
Payer: COMMERCIAL

## 2022-02-22 ENCOUNTER — OFFICE VISIT (OUTPATIENT)
Dept: OBSTETRICS AND GYNECOLOGY | Facility: CLINIC | Age: 32
End: 2022-02-22
Payer: COMMERCIAL

## 2022-02-22 VITALS
HEIGHT: 61 IN | SYSTOLIC BLOOD PRESSURE: 120 MMHG | BODY MASS INDEX: 28.89 KG/M2 | WEIGHT: 153 LBS | DIASTOLIC BLOOD PRESSURE: 78 MMHG

## 2022-02-22 DIAGNOSIS — N61.0 MASTITIS: Primary | ICD-10-CM

## 2022-02-22 PROCEDURE — 3008F BODY MASS INDEX DOCD: CPT | Mod: CPTII,S$GLB,, | Performed by: OBSTETRICS & GYNECOLOGY

## 2022-02-22 PROCEDURE — 3078F DIAST BP <80 MM HG: CPT | Mod: CPTII,S$GLB,, | Performed by: OBSTETRICS & GYNECOLOGY

## 2022-02-22 PROCEDURE — 99999 PR PBB SHADOW E&M-EST. PATIENT-LVL III: CPT | Mod: PBBFAC,,, | Performed by: OBSTETRICS & GYNECOLOGY

## 2022-02-22 PROCEDURE — 1159F PR MEDICATION LIST DOCUMENTED IN MEDICAL RECORD: ICD-10-PCS | Mod: CPTII,S$GLB,, | Performed by: OBSTETRICS & GYNECOLOGY

## 2022-02-22 PROCEDURE — 1160F RVW MEDS BY RX/DR IN RCRD: CPT | Mod: CPTII,S$GLB,, | Performed by: OBSTETRICS & GYNECOLOGY

## 2022-02-22 PROCEDURE — 3078F PR MOST RECENT DIASTOLIC BLOOD PRESSURE < 80 MM HG: ICD-10-PCS | Mod: CPTII,S$GLB,, | Performed by: OBSTETRICS & GYNECOLOGY

## 2022-02-22 PROCEDURE — 3074F PR MOST RECENT SYSTOLIC BLOOD PRESSURE < 130 MM HG: ICD-10-PCS | Mod: CPTII,S$GLB,, | Performed by: OBSTETRICS & GYNECOLOGY

## 2022-02-22 PROCEDURE — 3008F PR BODY MASS INDEX (BMI) DOCUMENTED: ICD-10-PCS | Mod: CPTII,S$GLB,, | Performed by: OBSTETRICS & GYNECOLOGY

## 2022-02-22 PROCEDURE — 99213 OFFICE O/P EST LOW 20 MIN: CPT | Mod: S$GLB,,, | Performed by: OBSTETRICS & GYNECOLOGY

## 2022-02-22 PROCEDURE — 1159F MED LIST DOCD IN RCRD: CPT | Mod: CPTII,S$GLB,, | Performed by: OBSTETRICS & GYNECOLOGY

## 2022-02-22 PROCEDURE — 99999 PR PBB SHADOW E&M-EST. PATIENT-LVL III: ICD-10-PCS | Mod: PBBFAC,,, | Performed by: OBSTETRICS & GYNECOLOGY

## 2022-02-22 PROCEDURE — 1160F PR REVIEW ALL MEDS BY PRESCRIBER/CLIN PHARMACIST DOCUMENTED: ICD-10-PCS | Mod: CPTII,S$GLB,, | Performed by: OBSTETRICS & GYNECOLOGY

## 2022-02-22 PROCEDURE — 99213 PR OFFICE/OUTPT VISIT, EST, LEVL III, 20-29 MIN: ICD-10-PCS | Mod: S$GLB,,, | Performed by: OBSTETRICS & GYNECOLOGY

## 2022-02-22 PROCEDURE — 3074F SYST BP LT 130 MM HG: CPT | Mod: CPTII,S$GLB,, | Performed by: OBSTETRICS & GYNECOLOGY

## 2022-02-22 RX ORDER — DICLOXACILLIN SODIUM 500 MG/1
500 CAPSULE ORAL EVERY 6 HOURS
Qty: 40 CAPSULE | Refills: 0 | Status: SHIPPED | OUTPATIENT
Start: 2022-02-22 | End: 2022-03-04

## 2022-02-22 NOTE — PROGRESS NOTES
CC: breast pain and erythema    Tamara Aldana is a 31 y.o. female  presents with complaint of above. Still BF 6 month old. + warmth and erythema breast with temp up to 100.7.     Past Medical History:   Diagnosis Date    ADD (attention deficit disorder)     Anxiety     History of panic attacks      Past Surgical History:   Procedure Laterality Date     SECTION N/A 2021    Procedure:  SECTION;  Surgeon: Julie R. Jeansonne, MD;  [facility]: [facility];  Service: OB/GYN;  Laterality: N/A;    LASIK Bilateral 2012    TONSILLECTOMY      WISDOM TOOTH EXTRACTION         Current Outpatient Medications:     cetirizine (ZYRTEC) 10 MG tablet, Take 10 mg by mouth once daily., Disp: , Rfl:     LINOLEIC ACID-SUNFLOWER OIL ORAL, Take by mouth., Disp: , Rfl:     LORazepam (ATIVAN) 0.5 MG tablet, Take one tablet by mouth once daily as needed for acute anxiety, Disp: 30 tablet, Rfl: 0    venlafaxine (EFFEXOR-XR) 150 MG Cp24, Take one capsule by mouth once daily, Disp: 30 capsule, Rfl: 2    dicloxacillin (DYNAPEN) 500 MG capsule, Take 1 capsule (500 mg total) by mouth every 6 (six) hours. for 10 days, Disp: 40 capsule, Rfl: 0  Review of patient's allergies indicates:   Allergen Reactions    Bactrim [sulfamethoxazole-trimethoprim] Itching     Social History     Tobacco Use    Smoking status: Never Smoker    Smokeless tobacco: Never Used   Substance Use Topics    Alcohol use: Not Currently    Drug use: No     Family History   Problem Relation Age of Onset    Hyperlipidemia Mother     Colon cancer Paternal Grandfather     Cancer Paternal Grandfather     Breast cancer Neg Hx     Ovarian cancer Neg Hx     Melanoma Neg Hx      OB History    Para Term  AB Living   1 1 1 0 0 1   SAB IAB Ectopic Multiple Live Births   0 0 0 0 1      # Outcome Date GA Lbr Bharath/2nd Weight Sex Delivery Anes PTL Lv   1 Term 21 37w2d  2.6 kg (5 lb 11.7 oz) M CS-LTranv Spinal, EPI N MICHAEL       Complications: Failure to Progress in Second Stage      Obstetric Comments   Menarche ~ 15            ROS:  GENERAL: No fever, chills, fatigability or weight loss.  VULVAR: No pain, no lesions and no itching.  VAGINAL: No relaxation, no itching, no discharge, no abnormal bleeding and no lesions.  ABDOMEN: No abdominal pain. Denies nausea. Denies vomiting. No diarrhea. No constipation  BREAST: + pain. No lumps. No discharge.  URINARY: No incontinence, no nocturia, no frequency and no dysuria.  CARDIOVASCULAR: No chest pain. No shortness of breath. No leg cramps.  NEUROLOGICAL: No headaches. No vision changes.    Vitals:    02/22/22 1125   BP: 120/78       Pt verbally consented for pelvic exam.  PHYSICAL EXAM:  GEN: NAD  Resp: nl effort  Breast: erythema noted right outer breast with TTP. No abscess palpated.   Psych: nl affect  Neuro: no focal deficits  Skin: warm and dry    ASSESSMENT and PLAN:    ICD-10-CM ICD-9-CM    1. Mastitis  N61.0 611.0      -Dicloxacillin sent. If no improvement 48 hours, will go to [facility] for abscess eval. Advised to continue to pump/feed from affected breast and continue warm compresses and tylenol prn.     FOLLOW UP: PRN lack of improvement.

## 2022-03-16 ENCOUNTER — PATIENT MESSAGE (OUTPATIENT)
Dept: OBSTETRICS AND GYNECOLOGY | Facility: CLINIC | Age: 32
End: 2022-03-16
Payer: COMMERCIAL

## 2022-04-04 ENCOUNTER — OFFICE VISIT (OUTPATIENT)
Dept: PRIMARY CARE CLINIC | Facility: CLINIC | Age: 32
End: 2022-04-04
Payer: COMMERCIAL

## 2022-04-04 VITALS
SYSTOLIC BLOOD PRESSURE: 120 MMHG | OXYGEN SATURATION: 99 % | HEIGHT: 61 IN | WEIGHT: 152.56 LBS | DIASTOLIC BLOOD PRESSURE: 76 MMHG | HEART RATE: 83 BPM | BODY MASS INDEX: 28.8 KG/M2 | TEMPERATURE: 98 F

## 2022-04-04 DIAGNOSIS — Z00.00 WELL ADULT EXAM: Primary | ICD-10-CM

## 2022-04-04 DIAGNOSIS — F41.9 ANXIETY: ICD-10-CM

## 2022-04-04 DIAGNOSIS — Z11.59 NEED FOR HEPATITIS C SCREENING TEST: ICD-10-CM

## 2022-04-04 PROCEDURE — 99395 PREV VISIT EST AGE 18-39: CPT | Mod: S$GLB,,, | Performed by: INTERNAL MEDICINE

## 2022-04-04 PROCEDURE — 3008F PR BODY MASS INDEX (BMI) DOCUMENTED: ICD-10-PCS | Mod: CPTII,S$GLB,, | Performed by: INTERNAL MEDICINE

## 2022-04-04 PROCEDURE — 3078F DIAST BP <80 MM HG: CPT | Mod: CPTII,S$GLB,, | Performed by: INTERNAL MEDICINE

## 2022-04-04 PROCEDURE — 1159F MED LIST DOCD IN RCRD: CPT | Mod: CPTII,S$GLB,, | Performed by: INTERNAL MEDICINE

## 2022-04-04 PROCEDURE — 99999 PR PBB SHADOW E&M-EST. PATIENT-LVL IV: ICD-10-PCS | Mod: PBBFAC,,, | Performed by: INTERNAL MEDICINE

## 2022-04-04 PROCEDURE — 3078F PR MOST RECENT DIASTOLIC BLOOD PRESSURE < 80 MM HG: ICD-10-PCS | Mod: CPTII,S$GLB,, | Performed by: INTERNAL MEDICINE

## 2022-04-04 PROCEDURE — 99999 PR PBB SHADOW E&M-EST. PATIENT-LVL IV: CPT | Mod: PBBFAC,,, | Performed by: INTERNAL MEDICINE

## 2022-04-04 PROCEDURE — 3008F BODY MASS INDEX DOCD: CPT | Mod: CPTII,S$GLB,, | Performed by: INTERNAL MEDICINE

## 2022-04-04 PROCEDURE — 1159F PR MEDICATION LIST DOCUMENTED IN MEDICAL RECORD: ICD-10-PCS | Mod: CPTII,S$GLB,, | Performed by: INTERNAL MEDICINE

## 2022-04-04 PROCEDURE — 3074F PR MOST RECENT SYSTOLIC BLOOD PRESSURE < 130 MM HG: ICD-10-PCS | Mod: CPTII,S$GLB,, | Performed by: INTERNAL MEDICINE

## 2022-04-04 PROCEDURE — 3074F SYST BP LT 130 MM HG: CPT | Mod: CPTII,S$GLB,, | Performed by: INTERNAL MEDICINE

## 2022-04-04 PROCEDURE — 99395 PR PREVENTIVE VISIT,EST,18-39: ICD-10-PCS | Mod: S$GLB,,, | Performed by: INTERNAL MEDICINE

## 2022-04-04 NOTE — PROGRESS NOTES
[facility] Progress Note  2022          Reason for Visit:      had concerns including Establish Care.       History of Present Illness:     Patient is here today to establish primary care.     She has no new complaints today.  She says that she has anxiety and has been  treated by a psychiatrist.  She is currently on venlafaxine 150 mg and 75 mg for a total of 225 mg. She also uses lorazepam as needed.  She says this has worked well to control her anxiety.  She has had ER visits in the past for panic attacks.  She says this current regimen is controlling well.  She says that it did get exacerbate the postpartum period but is doing better at this time.    Patient is interested in getting some screening blood work.  Reviewed her health maintenance which is up-to-date other than hip C screen which is a be able to get today.    Patient does have a family history of colon cancer in her grandfather.  She is unsure of his age at diagnosis.  Her father was diagnosed with polyps in his late 50s.  We discussed that given that the recommendation is to begin screening 10 years prior to the age of 1st degree relative was diagnosed, we think that we can just plan to tentatively start colon cancer screening at age 45 as usual.      Problem List:     Patient Active Problem List   Diagnosis    Attention-deficit hyperactivity disorder, unspecified type    PROM (premature rupture of membranes)    S/P  section    Depression    Severe preeclampsia    Anxiety         Surgical History:     She has a past surgical history that includes Tonsillectomy (); [place] tooth extraction (); LASIK (Bilateral, ); and  section (N/A, 2021).      Family History:      Her family history includes Cancer in her paternal grandfather; Colon cancer in her paternal grandfather; Colon polyps in her father; Hyperlipidemia in her mother; Schizophrenia in her mother.      Social History:     She  "reports that she has never smoked. She has never used smokeless tobacco. She reports previous alcohol use. She reports that she does not use drugs.      Medications:       Current Outpatient Medications:     cetirizine (ZYRTEC) 10 MG tablet, Take 10 mg by mouth once daily., Disp: , Rfl:     LINOLEIC ACID-SUNFLOWER OIL ORAL, Take by mouth., Disp: , Rfl:     LORazepam (ATIVAN) 0.5 MG tablet, Take one tab daily as needed acute anxiety, Disp: 30 tablet, Rfl: 1    venlafaxine (EFFEXOR-XR) 150 MG Cp24, Take one capsule daily, Disp: 90 capsule, Rfl: 0    venlafaxine (EFFEXOR-XR) 75 MG 24 hr capsule, take 1 capsule by mouth once daily, Disp: 90 capsule, Rfl: 0        Allergies:   She is allergic to bactrim [sulfamethoxazole-trimethoprim].      Review of Systems:     Review of Systems   Constitutional: Negative for chills and fever.   HENT: Negative for ear pain and sore throat.    Respiratory: Negative for cough, shortness of breath and wheezing.    Cardiovascular: Negative for chest pain and palpitations.   Gastrointestinal: Negative for constipation, diarrhea, nausea and vomiting.   Genitourinary: Negative for dysuria and hematuria.   Musculoskeletal: Negative for joint swelling and joint deformity.   Neurological: Negative for dizziness and weakness.           Physical Exam:     Temp:    98.4 °F (36.9 °C) (Oral)        Blood Pressure:  120/76        Pulse:   83     Respirations:     Weight:   69.2 kg (152 lb 8.9 oz)  Height:   5' 1" (1.549 m)  BMI:     Body mass index is 28.83 kg/m².    Physical Exam  Constitutional:       General: She is not in acute distress.  HENT:      Right Ear: Tympanic membrane normal.      Left Ear: Tympanic membrane normal.      Nose: No congestion or rhinorrhea.      Mouth/Throat:      Pharynx: No oropharyngeal exudate or posterior oropharyngeal erythema.   Cardiovascular:      Rate and Rhythm: Normal rate and regular rhythm.   Pulmonary:      Effort: Pulmonary effort is normal. No " respiratory distress.      Breath sounds: No wheezing.   Abdominal:      Palpations: Abdomen is soft.      Tenderness: There is no abdominal tenderness.   Skin:     General: Skin is warm.   Neurological:      General: No focal deficit present.      Mental Status: She is alert and oriented to person, place, and time.           Labs/Imaging/Testing:     Most recent CBC:  Lab Results   Component Value Date    WBC 9.55 08/11/2021    HGB 12.4 08/11/2021     08/11/2021    MCV 93 08/11/2021       Most recent Lipids:  Lab Results   Component Value Date    CHOL 195 12/02/2019    HDL 60 12/02/2019    LDLCALC 119.2 12/02/2019    TRIG 79 12/02/2019       Most recent Chemistry:  Lab Results   Component Value Date     08/11/2021    K 4.1 08/11/2021     08/11/2021    CO2 22 (L) 08/11/2021    BUN 13 08/11/2021    CREATININE 0.8 08/11/2021    GLU 91 08/11/2021    CALCIUM 9.0 08/11/2021    ALT 45 (H) 08/11/2021    AST 23 08/11/2021    ALKPHOS 130 08/11/2021    PROT 6.7 08/11/2021    ALBUMIN 3.1 (L) 08/11/2021       Other tests:  Lab Results   Component Value Date    HGBA1C 4.8 03/16/2021             Assessment and Plan:     1. Well adult exam  Routine labs ordered  HM reviewed.  Follow up annually or prn    - CBC Auto Differential; Future  - Comprehensive Metabolic Panel; Future  - Lipid Panel; Future  - TSH; Future  - Hepatitis C Antibody; Future    2. Anxiety  Continue current meds - working well    3. Need for hepatitis C screening test  - Hepatitis C Antibody; Future     RTC 12 months or sooner prn     Wang Salvador MD  4/4/2022    This note was prepared using voice-recognition software.  Although efforts are made to proofread the note, some errors may persist in the final document.

## 2022-04-05 ENCOUNTER — LAB VISIT (OUTPATIENT)
Dept: LAB | Facility: HOSPITAL | Age: 32
End: 2022-04-05
Attending: INTERNAL MEDICINE
Payer: COMMERCIAL

## 2022-04-05 DIAGNOSIS — Z11.59 NEED FOR HEPATITIS C SCREENING TEST: ICD-10-CM

## 2022-04-05 DIAGNOSIS — Z00.00 WELL ADULT EXAM: ICD-10-CM

## 2022-04-05 LAB
ALBUMIN SERPL BCP-MCNC: 4 G/DL (ref 3.5–5.2)
ALP SERPL-CCNC: 98 U/L (ref 55–135)
ALT SERPL W/O P-5'-P-CCNC: 20 U/L (ref 10–44)
ANION GAP SERPL CALC-SCNC: 10 MMOL/L (ref 8–16)
AST SERPL-CCNC: 18 U/L (ref 10–40)
BASOPHILS # BLD AUTO: 0.08 K/UL (ref 0–0.2)
BASOPHILS NFR BLD: 1 % (ref 0–1.9)
BILIRUB SERPL-MCNC: 0.4 MG/DL (ref 0.1–1)
BUN SERPL-MCNC: 13 MG/DL (ref 6–20)
CALCIUM SERPL-MCNC: 9.2 MG/DL (ref 8.7–10.5)
CHLORIDE SERPL-SCNC: 100 MMOL/L (ref 95–110)
CHOLEST SERPL-MCNC: 180 MG/DL (ref 120–199)
CHOLEST/HDLC SERPL: 2.9 {RATIO} (ref 2–5)
CO2 SERPL-SCNC: 25 MMOL/L (ref 23–29)
CREAT SERPL-MCNC: 0.8 MG/DL (ref 0.5–1.4)
DIFFERENTIAL METHOD: ABNORMAL
EOSINOPHIL # BLD AUTO: 0.4 K/UL (ref 0–0.5)
EOSINOPHIL NFR BLD: 5.7 % (ref 0–8)
ERYTHROCYTE [DISTWIDTH] IN BLOOD BY AUTOMATED COUNT: 13.3 % (ref 11.5–14.5)
EST. GFR  (AFRICAN AMERICAN): >60 ML/MIN/1.73 M^2
EST. GFR  (NON AFRICAN AMERICAN): >60 ML/MIN/1.73 M^2
GLUCOSE SERPL-MCNC: 86 MG/DL (ref 70–110)
HCT VFR BLD AUTO: 40.4 % (ref 37–48.5)
HDLC SERPL-MCNC: 63 MG/DL (ref 40–75)
HDLC SERPL: 35 % (ref 20–50)
HGB BLD-MCNC: 12.9 G/DL (ref 12–16)
IMM GRANULOCYTES # BLD AUTO: 0.01 K/UL (ref 0–0.04)
IMM GRANULOCYTES NFR BLD AUTO: 0.1 % (ref 0–0.5)
LDLC SERPL CALC-MCNC: 100 MG/DL (ref 63–159)
LYMPHOCYTES # BLD AUTO: 3.1 K/UL (ref 1–4.8)
LYMPHOCYTES NFR BLD: 39.6 % (ref 18–48)
MCH RBC QN AUTO: 30.1 PG (ref 27–31)
MCHC RBC AUTO-ENTMCNC: 31.9 G/DL (ref 32–36)
MCV RBC AUTO: 94 FL (ref 82–98)
MONOCYTES # BLD AUTO: 0.7 K/UL (ref 0.3–1)
MONOCYTES NFR BLD: 8.8 % (ref 4–15)
NEUTROPHILS # BLD AUTO: 3.5 K/UL (ref 1.8–7.7)
NEUTROPHILS NFR BLD: 44.8 % (ref 38–73)
NONHDLC SERPL-MCNC: 117 MG/DL
NRBC BLD-RTO: 0 /100 WBC
PLATELET # BLD AUTO: 291 K/UL (ref 150–450)
PMV BLD AUTO: 10.3 FL (ref 9.2–12.9)
POTASSIUM SERPL-SCNC: 4.2 MMOL/L (ref 3.5–5.1)
PROT SERPL-MCNC: 6.9 G/DL (ref 6–8.4)
RBC # BLD AUTO: 4.29 M/UL (ref 4–5.4)
SODIUM SERPL-SCNC: 135 MMOL/L (ref 136–145)
TRIGL SERPL-MCNC: 85 MG/DL (ref 30–150)
TSH SERPL DL<=0.005 MIU/L-ACNC: 1.47 UIU/ML (ref 0.4–4)
WBC # BLD AUTO: 7.76 K/UL (ref 3.9–12.7)

## 2022-04-05 PROCEDURE — 80061 LIPID PANEL: CPT | Performed by: INTERNAL MEDICINE

## 2022-04-05 PROCEDURE — 36415 COLL VENOUS BLD VENIPUNCTURE: CPT | Mod: PN | Performed by: INTERNAL MEDICINE

## 2022-04-05 PROCEDURE — 80053 COMPREHEN METABOLIC PANEL: CPT | Performed by: INTERNAL MEDICINE

## 2022-04-05 PROCEDURE — 86803 HEPATITIS C AB TEST: CPT | Performed by: INTERNAL MEDICINE

## 2022-04-05 PROCEDURE — 85025 COMPLETE CBC W/AUTO DIFF WBC: CPT | Performed by: INTERNAL MEDICINE

## 2022-04-05 PROCEDURE — 84443 ASSAY THYROID STIM HORMONE: CPT | Performed by: INTERNAL MEDICINE

## 2022-04-06 LAB — HCV AB SERPL QL IA: NEGATIVE

## 2022-04-22 ENCOUNTER — PATIENT MESSAGE (OUTPATIENT)
Dept: OBSTETRICS AND GYNECOLOGY | Facility: CLINIC | Age: 32
End: 2022-04-22
Payer: COMMERCIAL

## 2022-04-25 DIAGNOSIS — R30.0 DYSURIA: Primary | ICD-10-CM

## 2022-05-12 ENCOUNTER — PATIENT MESSAGE (OUTPATIENT)
Dept: PRIMARY CARE CLINIC | Facility: CLINIC | Age: 32
End: 2022-05-12
Payer: COMMERCIAL

## 2022-06-22 ENCOUNTER — PATIENT MESSAGE (OUTPATIENT)
Dept: OBSTETRICS AND GYNECOLOGY | Facility: CLINIC | Age: 32
End: 2022-06-22
Payer: COMMERCIAL

## 2022-06-23 RX ORDER — DICLOXACILLIN SODIUM 500 MG/1
500 CAPSULE ORAL 4 TIMES DAILY
Qty: 40 CAPSULE | Refills: 0 | Status: SHIPPED | OUTPATIENT
Start: 2022-06-23 | End: 2022-07-05

## 2022-07-14 ENCOUNTER — OFFICE VISIT (OUTPATIENT)
Dept: DERMATOLOGY | Facility: CLINIC | Age: 32
End: 2022-07-14
Payer: COMMERCIAL

## 2022-07-14 DIAGNOSIS — L81.4 LENTIGINES: ICD-10-CM

## 2022-07-14 DIAGNOSIS — D22.70 MULTIPLE BENIGN MELANOCYTIC NEVI OF UPPER EXTREMITY, LOWER EXTREMITY, AND TRUNK: ICD-10-CM

## 2022-07-14 DIAGNOSIS — D22.60 MULTIPLE BENIGN MELANOCYTIC NEVI OF UPPER EXTREMITY, LOWER EXTREMITY, AND TRUNK: ICD-10-CM

## 2022-07-14 DIAGNOSIS — D22.30 MELANOCYTIC NEVI OF FACE: ICD-10-CM

## 2022-07-14 DIAGNOSIS — Z12.83 SKIN CANCER SCREENING: ICD-10-CM

## 2022-07-14 DIAGNOSIS — D22.5 MULTIPLE BENIGN MELANOCYTIC NEVI OF UPPER EXTREMITY, LOWER EXTREMITY, AND TRUNK: ICD-10-CM

## 2022-07-14 DIAGNOSIS — D48.5 NEOPLASM OF UNCERTAIN BEHAVIOR OF SKIN: Primary | ICD-10-CM

## 2022-07-14 PROCEDURE — 1159F MED LIST DOCD IN RCRD: CPT | Mod: CPTII,S$GLB,, | Performed by: DERMATOLOGY

## 2022-07-14 PROCEDURE — 99213 OFFICE O/P EST LOW 20 MIN: CPT | Mod: 25,S$GLB,, | Performed by: DERMATOLOGY

## 2022-07-14 PROCEDURE — 99213 PR OFFICE/OUTPT VISIT, EST, LEVL III, 20-29 MIN: ICD-10-PCS | Mod: 25,S$GLB,, | Performed by: DERMATOLOGY

## 2022-07-14 PROCEDURE — 1160F RVW MEDS BY RX/DR IN RCRD: CPT | Mod: CPTII,S$GLB,, | Performed by: DERMATOLOGY

## 2022-07-14 PROCEDURE — 88305 TISSUE EXAM BY PATHOLOGIST: CPT | Mod: 26,,, | Performed by: PATHOLOGY

## 2022-07-14 PROCEDURE — 88342 CHG IMMUNOCYTOCHEMISTRY: ICD-10-PCS | Mod: 26,,, | Performed by: PATHOLOGY

## 2022-07-14 PROCEDURE — 1160F PR REVIEW ALL MEDS BY PRESCRIBER/CLIN PHARMACIST DOCUMENTED: ICD-10-PCS | Mod: CPTII,S$GLB,, | Performed by: DERMATOLOGY

## 2022-07-14 PROCEDURE — 11102 PR TANGENTIAL BIOPSY, SKIN, SINGLE LESION: ICD-10-PCS | Mod: S$GLB,,, | Performed by: DERMATOLOGY

## 2022-07-14 PROCEDURE — 88342 IMHCHEM/IMCYTCHM 1ST ANTB: CPT | Mod: 26,,, | Performed by: PATHOLOGY

## 2022-07-14 PROCEDURE — 88305 TISSUE EXAM BY PATHOLOGIST: ICD-10-PCS | Mod: 26,,, | Performed by: PATHOLOGY

## 2022-07-14 PROCEDURE — 88342 IMHCHEM/IMCYTCHM 1ST ANTB: CPT | Performed by: PATHOLOGY

## 2022-07-14 PROCEDURE — 88305 TISSUE EXAM BY PATHOLOGIST: CPT | Performed by: PATHOLOGY

## 2022-07-14 PROCEDURE — 1159F PR MEDICATION LIST DOCUMENTED IN MEDICAL RECORD: ICD-10-PCS | Mod: CPTII,S$GLB,, | Performed by: DERMATOLOGY

## 2022-07-14 PROCEDURE — 11102 TANGNTL BX SKIN SINGLE LES: CPT | Mod: S$GLB,,, | Performed by: DERMATOLOGY

## 2022-07-14 NOTE — PROGRESS NOTES
"  Patient Information  Name: Tamara Aldana  : 1990  MRN: [id number]     Referring Physician:  No ref. provider found   Primary Care Physician:  Wang Salvador MD   Date of Visit: 2022      Subjective:     History of Present lllness:    Tamara Aldana is a 32 y.o. female who presents with a chief complaint of mole and spot.    Patient is here today for a "mole" check.     Pt has a history of moderate sun exposure in the past.   Pt recalls several blistering sunburns in the past: no  Pt has history of tanning bed use: no  Pt has had moles removed in the past: yes-benign  Pt has personal history of skin cancer: no  Pt has family history of melanoma in first degree relatives: yes NMSC, great grandmother, maternal grandmother, and mother.    Today, patient complains of lesion(s):  Location: left upper forehead  Duration: years  Symptoms: wants checked  Relieving factors/Previous treatments: none    Clinical documentation obtained by nursing staff reviewed.    Review of Systems   Skin: Positive for daily sunscreen use, activity-related sunscreen use and wears hat. Negative for itching and rash.   Hematologic/Lymphatic: Does not bruise/bleed easily.       Objective:   Physical Exam   Constitutional: She appears well-developed and well-nourished. No distress.   Neurological: She is alert and oriented to person, place, and time. She is not disoriented.   Psychiatric: She has a normal mood and affect.   Skin:   Areas Examined (abnormalities noted in diagram):   Scalp / Hair Palpated and Inspected  Head / Face Inspection Performed  Neck Inspection Performed  Chest / Axilla Inspection Performed  Abdomen Inspection Performed  Genitals / Buttocks / Groin Inspection Performed  Back Inspection Performed  RUE Inspected  LUE Inspection Performed  RLE Inspected  LLE Inspection Performed  Nails and Digits Inspection Performed                     Diagram Legend     Erythematous scaling macule/papule c/w actinic " keratosis       Vascular papule c/w angioma      Pigmented verrucoid papule/plaque c/w seborrheic keratosis      Yellow umbilicated papule c/w sebaceous hyperplasia      Irregularly shaped tan macule c/w lentigo     1-2 mm smooth white papules consistent with Milia      Movable subcutaneous cyst with punctum c/w epidermal inclusion cyst      Subcutaneous movable cyst c/w pilar cyst      Firm pink to brown papule c/w dermatofibroma      Pedunculated fleshy papule(s) c/w skin tag(s)      Evenly pigmented macule c/w junctional nevus     Mildly variegated pigmented, slightly irregular-bordered macule c/w mildly atypical nevus      Flesh colored to evenly pigmented papule c/w intradermal nevus       Pink pearly papule/plaque c/w basal cell carcinoma      Erythematous hyperkeratotic cursted plaque c/w SCC      Surgical scar with no sign of skin cancer recurrence      Open and closed comedones      Inflammatory papules and pustules      Verrucoid papule consistent consistent with wart     Erythematous eczematous patches and plaques     Dystrophic onycholytic nail with subungual debris c/w onychomycosis     Umbilicated papule    Erythematous-base heme-crusted tan verrucoid plaque consistent with inflamed seborrheic keratosis     Erythematous Silvery Scaling Plaque c/w Psoriasis     See annotation          [] Data reviewed  [] Prior external notes reviewed  [] Independent review of test  [] Management discussed with another provider  [] Independent historian    Assessment / Plan:      Pathology Orders:     Normal Orders This Visit    Specimen to Pathology, Dermatology     Comments:    Number of Specimens:->1  ------------------------->-------------------------  Spec 1 Procedure:->Biopsy  Spec 1 Clinical Impression:->r/o dysplastic nevus  Spec 1 Source:->right upper arm    Questions:    Procedure Type: Dermatology and skin neoplasms    Number of Specimens: 1    ------------------------: -------------------------    Spec 1  Procedure: Biopsy    Spec 1 Clinical Impression: r/o dysplastic nevus    Spec 1 Source: right upper arm    Release to patient:         Neoplasm of uncertain behavior of skin  -     Specimen to Pathology, Dermatology    Shave biopsy procedure note:  Risk, benefits, and alternatives of biopsy are discussed with the patient, including risk of infection, scar, recurrence, and need for additional treatment of site. The patient agrees to the procedure by verbal consent. The area is marked and prepped with alcohol.  Approximately 1 mL of lidocaine 1% with epinephrine is used for local anesthesia. A sharp blade is used to remove the lesion. The specimen is sent for pathology. Hemostasis is obtained with aluminum chloride and/or monopolar hyfrecation if needed. The area is then dressed and bandaged. The patient tolerated the procedure well without adverse event. Written instructions on wound care were given and were reviewed with the patient, who is to call for any signs of bleeding or infection. The patient will be notified of the pathology results.    Melanocytic nevi of face  Multiple benign melanocytic nevi of upper extremity, lower extremity, and trunk  Multiple benign-appearing nevi present on exam today. Reassurance provided. Counseled patient to periodically examine moles and return to clinic if any changes in size, shape, or color are noted or if it becomes symptomatic (bleeding, itching, pain, etc).  Recommend using a broad-spectrum, water-resistant sunscreen with SPF of 30 or higher--reapply every 2 hours. Seek shade, wear sun-protective clothing, and perform regular skin self-exams.    Lentigines  These are benign sun spots which should be monitored for changes. Daily sun protection will reduce the number of new lesions.   Recommend using a broad-spectrum, water-resistant sunscreen with SPF of 30 or higher--reapply every 2 hours. Seek shade, wear sun-protective clothing, and perform regular skin  self-exams.    Skin cancer screening  Total body skin examination performed today as noted in physical exam. Suspicious lesion(s) were noted and/or biopsied as above.  Recommend using a broad-spectrum, water-resistant sunscreen with SPF of 30 or higher--reapply every 2 hours. Seek shade, wear sun-protective clothing, and perform regular skin self-exams.    Follow up in about 1 year (around 7/14/2023) for TBSE, or sooner dependent on pathology results.      Zohreh Nichole MD, FAAD  [facility]

## 2022-07-14 NOTE — PATIENT INSTRUCTIONS
How to perform a skin self-exam:  [email or web address]     Biopsy Wound Care Instructions    Leave the bandage on for 24 hours without getting it wet.   Clean the area once a day with a gentle soap and water, then pat dry and apply Vaseline and a bandaid.  The site should be kept moist with Vaseline at all times to improve healing. Reapply a thick coating as needed. Do not let the site air out or form a scab, as this will delay healing and worsen scarring.  If any bleeding or oozing occurs once you return home, apply firm pressure to the area for 30 minutes straight without peeking. If bleeding continues, call the office immediately.  Please message us via [facility], call us at [phone], or return to the office at any sign of increasing redness, swelling, tenderness, pain, heat, yellow drainage/discharge, or continued bleeding.      Receiving Your Pathology Results    Your pathology results will be released to you on [facility] at the same time that Dr. Nichole receives them.   Dr. Nichole will then message you with her interpretation of the results and/or with the plan going forward.  If you do not use [facility] or if your pathology results require more of an explanation, you will receive your results via a phone call.  If 2 weeks go by and you have not received your results, please message us via [facility] or call us at [phone] to inform us.

## 2022-07-21 ENCOUNTER — PATIENT MESSAGE (OUTPATIENT)
Dept: PRIMARY CARE CLINIC | Facility: CLINIC | Age: 32
End: 2022-07-21
Payer: COMMERCIAL

## 2022-07-25 ENCOUNTER — PATIENT MESSAGE (OUTPATIENT)
Dept: DERMATOLOGY | Facility: CLINIC | Age: 32
End: 2022-07-25
Payer: COMMERCIAL

## 2022-07-25 LAB
FINAL PATHOLOGIC DIAGNOSIS: NORMAL
GROSS: NORMAL
Lab: NORMAL
MICROSCOPIC EXAM: NORMAL

## 2022-07-25 NOTE — PROGRESS NOTES
Final Pathologic Diagnosis   Skin, right upper arm, shave biopsy:   -JUNCTIONAL MELANOCYTIC NEVUS WITH MODERATE ARCHITECTURAL AND CYTOLOGIC ATYPIA   -THE LESION APPEARS EXCISED IN THE PLANES OF SECTION EXAMINED

## 2022-09-07 ENCOUNTER — OFFICE VISIT (OUTPATIENT)
Dept: URGENT CARE | Facility: CLINIC | Age: 32
End: 2022-09-07
Payer: COMMERCIAL

## 2022-09-07 VITALS
OXYGEN SATURATION: 98 % | RESPIRATION RATE: 17 BRPM | WEIGHT: 152 LBS | BODY MASS INDEX: 28.7 KG/M2 | SYSTOLIC BLOOD PRESSURE: 129 MMHG | TEMPERATURE: 99 F | DIASTOLIC BLOOD PRESSURE: 87 MMHG | HEART RATE: 80 BPM | HEIGHT: 61 IN

## 2022-09-07 DIAGNOSIS — S90.455A: Primary | ICD-10-CM

## 2022-09-07 PROCEDURE — 1159F MED LIST DOCD IN RCRD: CPT | Mod: CPTII,S$GLB,, | Performed by: NURSE PRACTITIONER

## 2022-09-07 PROCEDURE — 3074F PR MOST RECENT SYSTOLIC BLOOD PRESSURE < 130 MM HG: ICD-10-PCS | Mod: CPTII,S$GLB,, | Performed by: NURSE PRACTITIONER

## 2022-09-07 PROCEDURE — 1160F PR REVIEW ALL MEDS BY PRESCRIBER/CLIN PHARMACIST DOCUMENTED: ICD-10-PCS | Mod: CPTII,S$GLB,, | Performed by: NURSE PRACTITIONER

## 2022-09-07 PROCEDURE — 3074F SYST BP LT 130 MM HG: CPT | Mod: CPTII,S$GLB,, | Performed by: NURSE PRACTITIONER

## 2022-09-07 PROCEDURE — 1160F RVW MEDS BY RX/DR IN RCRD: CPT | Mod: CPTII,S$GLB,, | Performed by: NURSE PRACTITIONER

## 2022-09-07 PROCEDURE — 3008F BODY MASS INDEX DOCD: CPT | Mod: CPTII,S$GLB,, | Performed by: NURSE PRACTITIONER

## 2022-09-07 PROCEDURE — 3008F PR BODY MASS INDEX (BMI) DOCUMENTED: ICD-10-PCS | Mod: CPTII,S$GLB,, | Performed by: NURSE PRACTITIONER

## 2022-09-07 PROCEDURE — 99214 OFFICE O/P EST MOD 30 MIN: CPT | Mod: S$GLB,,, | Performed by: NURSE PRACTITIONER

## 2022-09-07 PROCEDURE — 1159F PR MEDICATION LIST DOCUMENTED IN MEDICAL RECORD: ICD-10-PCS | Mod: CPTII,S$GLB,, | Performed by: NURSE PRACTITIONER

## 2022-09-07 PROCEDURE — 99214 PR OFFICE/OUTPT VISIT, EST, LEVL IV, 30-39 MIN: ICD-10-PCS | Mod: S$GLB,,, | Performed by: NURSE PRACTITIONER

## 2022-09-07 PROCEDURE — 3079F PR MOST RECENT DIASTOLIC BLOOD PRESSURE 80-89 MM HG: ICD-10-PCS | Mod: CPTII,S$GLB,, | Performed by: NURSE PRACTITIONER

## 2022-09-07 PROCEDURE — 3079F DIAST BP 80-89 MM HG: CPT | Mod: CPTII,S$GLB,, | Performed by: NURSE PRACTITIONER

## 2022-09-07 NOTE — PROGRESS NOTES
"Subjective:       Patient ID: Tamara Aldana is a 32 y.o. female.    Vitals:  height is 5' 0.98" (1.549 m) and weight is 68.9 kg (152 lb). Her tympanic temperature is 98.6 °F (37 °C). Her blood pressure is 129/87 and her pulse is 80. Her respiration is 17 and oxygen saturation is 98%.     Chief Complaint: Foreign Body in Skin    32 y.o female presents today c/o a splinter on one of her L toe, reports that she was walking this morning by her front porch and hit an old piece of wood with her L foot.    Other  This is a new problem. The current episode started today. Pertinent negatives include no abdominal pain, anorexia, arthralgias, change in bowel habit, chest pain, chills, congestion, coughing, diaphoresis, fatigue, fever, headaches, joint swelling, myalgias, nausea, neck pain, numbness, rash, sore throat, swollen glands, urinary symptoms, vertigo, visual change, vomiting or weakness. Nothing aggravates the symptoms. She has tried nothing for the symptoms.     Constitution: Negative. Negative for chills, sweating, fatigue and fever.   HENT: Negative.  Negative for congestion and sore throat.    Neck: Negative for neck pain.   Cardiovascular: Negative.  Negative for chest pain.   Respiratory: Negative.  Negative for cough.    Gastrointestinal: Negative.  Negative for abdominal pain, nausea and vomiting.   Endocrine: negative.   Genitourinary: Negative.  Negative for frequency and urgency.   Musculoskeletal: Negative.  Negative for joint pain, joint swelling and muscle ache.   Skin: Negative.  Negative for rash.   Allergic/Immunologic: Negative.    Neurological: Negative.  Negative for history of vertigo, headaches and numbness.   Hematologic/Lymphatic: Negative.    Psychiatric/Behavioral: Negative.       Objective:      Physical Exam   Skin:          Procedures site cleaned with betadine  0.5 cc lidocaine injected into puncture site. Splinter removed using 18 g and tissue forceps.   Patient tolerated well. "     Assessment:       1. Splinter of toe of left foot without infection, initial encounter          Plan:       Clean with soap and water  Keep clean and dry    Splinter of toe of left foot without infection, initial encounter

## 2022-09-07 NOTE — PROCEDURES
Procedures site cleaned with betadine  0.5 cc lidocaine injected into puncture site. Splinter removed using 18 g and tissue forceps.   Patient tolerated well.

## 2022-11-21 ENCOUNTER — PATIENT OUTREACH (OUTPATIENT)
Dept: ADMINISTRATIVE | Facility: HOSPITAL | Age: 32
End: 2022-11-21
Payer: COMMERCIAL

## 2022-11-21 ENCOUNTER — PATIENT MESSAGE (OUTPATIENT)
Dept: ADMINISTRATIVE | Facility: HOSPITAL | Age: 32
End: 2022-11-21
Payer: COMMERCIAL

## 2023-02-23 ENCOUNTER — PATIENT MESSAGE (OUTPATIENT)
Dept: PRIMARY CARE CLINIC | Facility: CLINIC | Age: 33
End: 2023-02-23
Payer: COMMERCIAL

## 2023-03-01 ENCOUNTER — OFFICE VISIT (OUTPATIENT)
Dept: OTOLARYNGOLOGY | Facility: CLINIC | Age: 33
End: 2023-03-01
Payer: COMMERCIAL

## 2023-03-01 VITALS
WEIGHT: 143.31 LBS | BODY MASS INDEX: 27.06 KG/M2 | SYSTOLIC BLOOD PRESSURE: 127 MMHG | HEART RATE: 84 BPM | HEIGHT: 61 IN | DIASTOLIC BLOOD PRESSURE: 85 MMHG

## 2023-03-01 DIAGNOSIS — J30.1 NON-SEASONAL ALLERGIC RHINITIS DUE TO POLLEN: ICD-10-CM

## 2023-03-01 DIAGNOSIS — R09.82 POSTNASAL DRIP: ICD-10-CM

## 2023-03-01 DIAGNOSIS — R09.81 NASAL CONGESTION: Primary | ICD-10-CM

## 2023-03-01 PROCEDURE — 1160F PR REVIEW ALL MEDS BY PRESCRIBER/CLIN PHARMACIST DOCUMENTED: ICD-10-PCS | Mod: CPTII,S$GLB,, | Performed by: OTOLARYNGOLOGY

## 2023-03-01 PROCEDURE — 99203 OFFICE O/P NEW LOW 30 MIN: CPT | Mod: S$GLB,,, | Performed by: OTOLARYNGOLOGY

## 2023-03-01 PROCEDURE — 3008F BODY MASS INDEX DOCD: CPT | Mod: CPTII,S$GLB,, | Performed by: OTOLARYNGOLOGY

## 2023-03-01 PROCEDURE — 1159F MED LIST DOCD IN RCRD: CPT | Mod: CPTII,S$GLB,, | Performed by: OTOLARYNGOLOGY

## 2023-03-01 PROCEDURE — 99999 PR PBB SHADOW E&M-EST. PATIENT-LVL IV: CPT | Mod: PBBFAC,,, | Performed by: OTOLARYNGOLOGY

## 2023-03-01 PROCEDURE — 1160F RVW MEDS BY RX/DR IN RCRD: CPT | Mod: CPTII,S$GLB,, | Performed by: OTOLARYNGOLOGY

## 2023-03-01 PROCEDURE — 3079F DIAST BP 80-89 MM HG: CPT | Mod: CPTII,S$GLB,, | Performed by: OTOLARYNGOLOGY

## 2023-03-01 PROCEDURE — 3008F PR BODY MASS INDEX (BMI) DOCUMENTED: ICD-10-PCS | Mod: CPTII,S$GLB,, | Performed by: OTOLARYNGOLOGY

## 2023-03-01 PROCEDURE — 99203 PR OFFICE/OUTPT VISIT, NEW, LEVL III, 30-44 MIN: ICD-10-PCS | Mod: S$GLB,,, | Performed by: OTOLARYNGOLOGY

## 2023-03-01 PROCEDURE — 1159F PR MEDICATION LIST DOCUMENTED IN MEDICAL RECORD: ICD-10-PCS | Mod: CPTII,S$GLB,, | Performed by: OTOLARYNGOLOGY

## 2023-03-01 PROCEDURE — 3074F PR MOST RECENT SYSTOLIC BLOOD PRESSURE < 130 MM HG: ICD-10-PCS | Mod: CPTII,S$GLB,, | Performed by: OTOLARYNGOLOGY

## 2023-03-01 PROCEDURE — 3079F PR MOST RECENT DIASTOLIC BLOOD PRESSURE 80-89 MM HG: ICD-10-PCS | Mod: CPTII,S$GLB,, | Performed by: OTOLARYNGOLOGY

## 2023-03-01 PROCEDURE — 3074F SYST BP LT 130 MM HG: CPT | Mod: CPTII,S$GLB,, | Performed by: OTOLARYNGOLOGY

## 2023-03-01 PROCEDURE — 99999 PR PBB SHADOW E&M-EST. PATIENT-LVL IV: ICD-10-PCS | Mod: PBBFAC,,, | Performed by: OTOLARYNGOLOGY

## 2023-03-01 RX ORDER — AZELASTINE 1 MG/ML
1 SPRAY, METERED NASAL 2 TIMES DAILY
Qty: 30 ML | Refills: 11 | Status: SHIPPED | OUTPATIENT
Start: 2023-03-01 | End: 2023-04-03

## 2023-03-01 NOTE — PATIENT INSTRUCTIONS
Please start Nasal Saline irrigation with NeilMed sinus rinse or equivalent over the counter once a day. Please use Distilled or Boiled water with packets as instructed on packaging.  Continue Flonase and Astelin as prescribed and spray to the outside (towards eye/ear) as show in clinic

## 2023-03-01 NOTE — PROGRESS NOTES
History of Present Illness:   Tamara Aldana is a 32 y.o. year old female evaluated on 3/1/2023, in the Otolaryngology-Head and Neck Surgery Clinic at [facility]. The patient is self-referred nasal problems.  Patient reports long-term history of nasal stuffiness but has been worse over the past several months.  She reports that her left nose gets stuffed up most of the time however this alternates sides.  She is not had any prior allergy testing.  She denies any prior nasal trauma or nasal surgery.  She has tried over the counter remedies to include Flonase and Zyrtec but these have not helped.  She denies any midface pressure thick mucus or fevers.           Past Medical/Surgical History  Past Medical History:   Diagnosis Date    ADD (attention deficit disorder)     Anxiety     History of panic attacks      Her  has a past surgical history that includes Tonsillectomy (); [place] tooth extraction (); LASIK (Bilateral, ); and  section (N/A, 2021).     Past Family/Social History  Her family history includes Cancer in her paternal grandfather; Colon cancer in her paternal grandfather; Colon polyps in her father; Hyperlipidemia in her mother; Schizophrenia in her mother.  She  reports that she has never smoked. She has never used smokeless tobacco. She reports that she does not currently use alcohol. She reports that she does not use drugs.     Medications/Allergies/Immunizations  Her current medication(s) include:   Current Outpatient Medications   Medication Sig Dispense Refill    LORazepam (ATIVAN) 0.5 MG tablet Take 1 tablet (0.5 mg total) by mouth daily as needed for acute anxiety 30 tablet 1    LORazepam (ATIVAN) 0.5 MG tablet Take one tablet by mouth daily as needed for anxiety 30 tablet 2    venlafaxine (EFFEXOR-XR) 150 MG Cp24 Take one capsule daily 90 capsule 1    venlafaxine (EFFEXOR-XR) 75 MG 24 hr capsule Take 1 capsule by mouth once daily. 90 capsule 0    venlafaxine  (EFFEXOR-XR) 75 MG 24 hr capsule Take one capsule by mouth daily 90 capsule 1    azelastine (ASTELIN) 137 mcg (0.1 %) nasal spray 1 spray (137 mcg total) by Nasal route 2 (two) times daily. 30 mL 11    cetirizine (ZYRTEC) 10 MG tablet Take 10 mg by mouth once daily.      chlorhexidine (PERIDEX) 0.12 % solution Rinse one capful for 30 seconds and expectorate, use after breakfast and before bed until bottle is complete or as directed by doctor (Patient not taking: Reported on 3/1/2023) 473 mL 3    ibuprofen (ADVIL,MOTRIN) 800 MG tablet Take 1 tablet by mouth every 8 hours as needed for pain 30 tablet 0    LINOLEIC ACID-SUNFLOWER OIL ORAL Take by mouth.      lisdexamfetamine (VYVANSE) 20 MG capsule Take 1 capsule by mouth every morning (Patient not taking: Reported on 3/1/2023) 30 capsule 0    lisdexamfetamine (VYVANSE) 20 MG capsule Take 1 capsule by mouth every morning. (Patient not taking: Reported on 3/1/2023) 30 capsule 0     No current facility-administered medications for this visit.        Allergies: Bactrim [sulfamethoxazole-trimethoprim]     Immunizations:   Immunization History   Administered Date(s) Administered    COVID-19, MRNA, LN-S, PF (Pfizer) (Purple Cap) 01/11/2021, 02/01/2021, 12/23/2021    DTaP 1990, [birth year], 11/25/1992, 07/11/1996    HIB 1990, [birth year], 09/02/1992    HPV Quadrivalent 06/19/2007, 08/31/2007, 01/02/2008    Hepatitis B 08/22/2013, 08/22/2013, 10/15/2013, 10/15/2013    Hepatitis B, Adult 08/05/2014    Hepatitis B, Pediatric/Adolescent 07/06/1993, 07/06/2003, 08/20/2003, 11/24/2003    IPV 1990, 1990, 1990, 07/11/1996    Influenza 09/27/2020    Influenza - Quadrivalent - PF *Preferred* (6 months and older) 10/22/2018, 10/03/2019, 09/29/2020, 10/18/2021, 10/20/2022    Influenza - Trivalent (ADULT) 01/19/2013    Influenza - Trivalent - PF (ADULT) 09/11/2015, 09/05/2016    MMR 09/02/1992, 07/11/1996, 01/16/2018, 02/22/2018     "Meningococcal Conjugate (MCV4P) 06/19/2007    PPD Test 05/24/2011, 05/27/2011, 03/21/2014    Tdap 04/25/2016, 12/02/2019, 06/14/2021    Tetanus 06/06/2006         Review of Systems   Constitutional: Negative for fever, weight loss and weight gain.  Skin: Negative for rash, itchiness, dryness  HENT:  As per HPI  Cardiovascular: Negative for chest pain and dyspnea on exertion .   Respiratory: Is not experiencing shortness of breath.   Gastrointestinal: Negative for nausea and vomiting.   Neurological: Negative for headaches.   Lymph/Heme: Negative for lymphadenopathy or easy bruising  Musculoskeletal: Negative for joint or muscle pain  Psychiatric: The patient is not nervous/anxious.        All other systems are negative except for that listed in the HPI.      PHYSICAL EXAM:   Vital Signs:  /85 (BP Location: Left arm, Patient Position: Sitting)   Pulse 84   Ht 5' 0.98" (1.549 m)   Wt 65 kg (143 lb 4.8 oz)   BMI 27.09 kg/m²      General:  Well-developed, well-nourished  Communication and Voice:  Clear pitch and clarity  Hearing: Hearing adequate for verbal communication bilaterally   Inspection:  Normocephalic and atraumatic without mass or lesion  Palpation:  Facial skeleton intact without bony stepoffs  Parotid Glands:  No mass or tenderness  Facial Strength:  Facial motility symmetric and full bilaterally  Pinna:  External ear intact and fully developed  External canal:  Canal is patent with intact skin  Tympanic Membrane:  Clear and mobile  External nose:  No scar or anatomic deformity  Internal Nose:  Septum intact and midline.  Mildly hyperemic mucosa with some mucus but no significant edema or polyp no significant turbinate hypertrophy TMJ:  No pain to palpation with full mobility  Oral cavity, Lips, Teeth, and Gums:  Mucosa and teeth intact and viable, No lesions, masses or ulcers  Oropharynx: No erythema or exudate, no masses or ulcerations, non-obstructive tonsils  Nasopharynx:  No mass or lesion " with intact mucosa  Hypopharynx:  Not well visualized secondary to gagging  Larynx:  Not well visualized secondary to gagging  Neck, Trachea, Lymphatics:  Midline trachea without mass or lesion, no lymphadenopathy  Thyroid:  No mass or nodularity  Eyes: No nystagmus with equal extraocular motion bilaterally  Neuro/Psych/Balance: Patient oriented and appropriate in interaction;  Appropriate mood and affect;  Gait is intact with no imbalance; Cranial nerves I-XII are intact  Respiratory effort:  Equal inspiration and expiration without stridor  Peripheral Vascular:  Warm extremities with equal pulses     ASSESSMENT:   1. Nasal congestion    2. Non-seasonal allergic rhinitis due to pollen    3. Postnasal drip            PLAN:   I explained to the patient that most of her symptoms are related to allergic rhinitis.  I do not see any evidence of sinusitis.  I recommended nasal saline irrigation.  I will start her on Astelin nasal spray.  She will continue Zyrtec at night as a rescue medication.  If she does not improve I recommend allergy referral for testing.      I believe that Ms. Aldana has a good understanding of the issues involved and I answered all of her questions.     DISCLAIMER: This note was prepared with Linea voice recognition transcription software. Garbled syntax, mangled pronouns, and other bizarre constructions may be attributed to that software system. While efforts were made to correct any mistakes made by this voice recognition program, some errors and/or omissions may remain in the note that were missed when the note was originally created.

## 2023-03-06 ENCOUNTER — PATIENT MESSAGE (OUTPATIENT)
Dept: OBSTETRICS AND GYNECOLOGY | Facility: CLINIC | Age: 33
End: 2023-03-06
Payer: COMMERCIAL

## 2023-03-22 ENCOUNTER — OFFICE VISIT (OUTPATIENT)
Dept: DERMATOLOGY | Facility: CLINIC | Age: 33
End: 2023-03-22
Payer: COMMERCIAL

## 2023-03-22 VITALS — BODY MASS INDEX: 27.04 KG/M2 | WEIGHT: 143 LBS

## 2023-03-22 DIAGNOSIS — D48.5 NEOPLASM OF UNCERTAIN BEHAVIOR OF SKIN: Primary | ICD-10-CM

## 2023-03-22 DIAGNOSIS — L81.4 LENTIGINES: ICD-10-CM

## 2023-03-22 DIAGNOSIS — Z87.898 HISTORY OF ATYPICAL NEVUS: ICD-10-CM

## 2023-03-22 DIAGNOSIS — D22.9 NEVUS OF MULTIPLE SITES: ICD-10-CM

## 2023-03-22 PROCEDURE — 1159F PR MEDICATION LIST DOCUMENTED IN MEDICAL RECORD: ICD-10-PCS | Mod: CPTII,S$GLB,, | Performed by: DERMATOLOGY

## 2023-03-22 PROCEDURE — 3008F PR BODY MASS INDEX (BMI) DOCUMENTED: ICD-10-PCS | Mod: CPTII,S$GLB,, | Performed by: DERMATOLOGY

## 2023-03-22 PROCEDURE — 99999 PR PBB SHADOW E&M-EST. PATIENT-LVL III: CPT | Mod: PBBFAC,,, | Performed by: DERMATOLOGY

## 2023-03-22 PROCEDURE — 88341 IMHCHEM/IMCYTCHM EA ADD ANTB: CPT | Performed by: PATHOLOGY

## 2023-03-22 PROCEDURE — 88305 TISSUE EXAM BY PATHOLOGIST: CPT | Performed by: PATHOLOGY

## 2023-03-22 PROCEDURE — 88342 CHG IMMUNOCYTOCHEMISTRY: ICD-10-PCS | Mod: 26,,, | Performed by: PATHOLOGY

## 2023-03-22 PROCEDURE — 1160F RVW MEDS BY RX/DR IN RCRD: CPT | Mod: CPTII,S$GLB,, | Performed by: DERMATOLOGY

## 2023-03-22 PROCEDURE — 88342 IMHCHEM/IMCYTCHM 1ST ANTB: CPT | Performed by: PATHOLOGY

## 2023-03-22 PROCEDURE — 88305 TISSUE EXAM BY PATHOLOGIST: CPT | Mod: 26,,, | Performed by: PATHOLOGY

## 2023-03-22 PROCEDURE — 1159F MED LIST DOCD IN RCRD: CPT | Mod: CPTII,S$GLB,, | Performed by: DERMATOLOGY

## 2023-03-22 PROCEDURE — 88341 PR IHC OR ICC EACH ADD'L SINGLE ANTIBODY  STAINPR: ICD-10-PCS | Mod: 26,,, | Performed by: PATHOLOGY

## 2023-03-22 PROCEDURE — 88342 IMHCHEM/IMCYTCHM 1ST ANTB: CPT | Mod: 26,,, | Performed by: PATHOLOGY

## 2023-03-22 PROCEDURE — 11102 TANGNTL BX SKIN SINGLE LES: CPT | Mod: S$GLB,,, | Performed by: DERMATOLOGY

## 2023-03-22 PROCEDURE — 11102 PR TANGENTIAL BIOPSY, SKIN, SINGLE LESION: ICD-10-PCS | Mod: S$GLB,,, | Performed by: DERMATOLOGY

## 2023-03-22 PROCEDURE — 99214 PR OFFICE/OUTPT VISIT, EST, LEVL IV, 30-39 MIN: ICD-10-PCS | Mod: 25,S$GLB,, | Performed by: DERMATOLOGY

## 2023-03-22 PROCEDURE — 99214 OFFICE O/P EST MOD 30 MIN: CPT | Mod: 25,S$GLB,, | Performed by: DERMATOLOGY

## 2023-03-22 PROCEDURE — 1160F PR REVIEW ALL MEDS BY PRESCRIBER/CLIN PHARMACIST DOCUMENTED: ICD-10-PCS | Mod: CPTII,S$GLB,, | Performed by: DERMATOLOGY

## 2023-03-22 PROCEDURE — 3008F BODY MASS INDEX DOCD: CPT | Mod: CPTII,S$GLB,, | Performed by: DERMATOLOGY

## 2023-03-22 PROCEDURE — 99999 PR PBB SHADOW E&M-EST. PATIENT-LVL III: ICD-10-PCS | Mod: PBBFAC,,, | Performed by: DERMATOLOGY

## 2023-03-22 PROCEDURE — 88305 TISSUE EXAM BY PATHOLOGIST: ICD-10-PCS | Mod: 26,,, | Performed by: PATHOLOGY

## 2023-03-22 PROCEDURE — 88341 IMHCHEM/IMCYTCHM EA ADD ANTB: CPT | Mod: 26,,, | Performed by: PATHOLOGY

## 2023-03-22 NOTE — PROGRESS NOTES
Subjective:       Patient ID:  Tamara Aldana is a 32 y.o. female who presents for   Chief Complaint   Patient presents with    Skin Check     Would like skin check no lesions of concern.     Lesion - Initial  Affected locations: face, scalp, left arm, right arm, chest, torso, back, abdomen, left upper leg, right upper leg, left lower leg and right lower leg  Signs / symptoms: asymptomatic  Aggravated by: nothing  Relieving factors/Treatments tried: nothing    Review of Systems   Constitutional:  Negative for fever, chills, weight loss, weight gain, fatigue, night sweats and malaise.   Skin:  Positive for daily sunscreen use and activity-related sunscreen use. Negative for wears hat.   Hematologic/Lymphatic: Does not bruise/bleed easily.      Objective:    Physical Exam   Constitutional: She appears well-developed and well-nourished. No distress.   Neurological: She is alert and oriented to person, place, and time. She is not disoriented.   Psychiatric: She has a normal mood and affect.   Skin:   Areas Examined (abnormalities noted in diagram):   Scalp / Hair Palpated and Inspected  Head / Face Inspection Performed  Neck Inspection Performed  Chest / Axilla Inspection Performed  Abdomen Inspection Performed  Back Inspection Performed  RUE Inspected  LUE Inspection Performed  RLE Inspected  LLE Inspection Performed  Nails and Digits Inspection Performed                     Diagram Legend     Erythematous scaling macule/papule c/w actinic keratosis       Vascular papule c/w angioma      Pigmented verrucoid papule/plaque c/w seborrheic keratosis      Yellow umbilicated papule c/w sebaceous hyperplasia      Irregularly shaped tan macule c/w lentigo     1-2 mm smooth white papules consistent with Milia      Movable subcutaneous cyst with punctum c/w epidermal inclusion cyst      Subcutaneous movable cyst c/w pilar cyst      Firm pink to brown papule c/w dermatofibroma      Pedunculated fleshy papule(s) c/w skin  "tag(s)      Evenly pigmented macule c/w junctional nevus     Mildly variegated pigmented, slightly irregular-bordered macule c/w mildly atypical nevus      Flesh colored to evenly pigmented papule c/w intradermal nevus       Pink pearly papule/plaque c/w basal cell carcinoma      Erythematous hyperkeratotic cursted plaque c/w SCC      Surgical scar with no sign of skin cancer recurrence      Open and closed comedones      Inflammatory papules and pustules      Verrucoid papule consistent consistent with wart     Erythematous eczematous patches and plaques     Dystrophic onycholytic nail with subungual debris c/w onychomycosis     Umbilicated papule    Erythematous-base heme-crusted tan verrucoid plaque consistent with inflamed seborrheic keratosis     Erythematous Silvery Scaling Plaque c/w Psoriasis     See annotation      Assessment / Plan:      Pathology Orders:       Normal Orders This Visit    Specimen to Pathology, Dermatology     Comments:    Number of Specimens:->1  ------------------------->-------------------------  Spec 1 Procedure:->Biopsy  Spec 1 Clinical Impression:->nevus  Spec 1 Source:->right abdomen    Questions:    Procedure Type: Dermatology and skin neoplasms    Number of Specimens: 1    ------------------------: -------------------------    Spec 1 Procedure: Biopsy    Spec 1 Clinical Impression: nevus    Spec 1 Source: right abdomen    Release to patient:           Neoplasm of uncertain behavior of skin  -     Specimen to Pathology, Dermatology  Shave removal procedure note:    Shave removal performed after verbal consent including risk of infection, scar, recurrence, need for additional treatment of site. Area prepped with alcohol, anesthetized 1% lidocaine with epinephrine. Lesional tissue shaved. Lesion defect size 7mm No complications. Dressing applied. Wound care explained.          Nevus of multiple sites  The "ABCD" rules to observe pigmented lesions were reviewed.  Brochure " "provided      History of atypical nevus  Comments:  moderate atypia right upper arm no recurrence    Lentigines  The "ABCD" rules to observe pigmented lesions were reviewed.  sunscreen             Follow up in about 1 year (around 3/22/2024).  "

## 2023-03-29 ENCOUNTER — PATIENT MESSAGE (OUTPATIENT)
Dept: DERMATOLOGY | Facility: CLINIC | Age: 33
End: 2023-03-29
Payer: COMMERCIAL

## 2023-03-29 LAB
FINAL PATHOLOGIC DIAGNOSIS: NORMAL
GROSS: NORMAL
Lab: NORMAL
MICROSCOPIC EXAM: NORMAL
SUPPLEMENTAL DIAGNOSIS: NORMAL

## 2023-04-02 NOTE — PROGRESS NOTES
[facility] Progress Note  4/3/2023          Reason for Visit:      had concerns including Annual Exam.       History of Present Illness:     Pt is here today for an annual exam.    Her only concern is difficulties losing weight after her pregnancy 19 months ago.  She has been doing orangetheory, exercising regulalry, trying to eat healthy but weight doesn't seem to change.  She isn't really interested in weight loss medicaitons.  She would be interested to see if labs would shed any light on the situation.      Allergies really bad lately -   Saw ENT  Already on Flonase and zyrtec.  They prescribed astelin, but she was nervous about using it because it listed weight gain as possible side effect.  Some conjunctival injections- using pataday drops.  She is interested in seeing allergist to evaluate      Problem List:     Patient Active Problem List   Diagnosis    Attention-deficit hyperactivity disorder, unspecified type    PROM (premature rupture of membranes)    S/P  section    Depression    Severe preeclampsia    Anxiety         Surgical History:     She has a past surgical history that includes Tonsillectomy (); [place] tooth extraction (); LASIK (Bilateral, ); and  section (N/A, 2021).      Family History:      Her family history includes Cancer in her paternal grandfather; Colon cancer in her paternal grandfather; Colon polyps in her father; Hyperlipidemia in her mother; Schizophrenia in her mother.      Social History:     She reports that she has never smoked. She has never been exposed to tobacco smoke. She has never used smokeless tobacco. She reports that she does not currently use alcohol. She reports that she does not use drugs.      Medications:       Current Outpatient Medications:     cetirizine (ZYRTEC) 10 MG tablet, Take 10 mg by mouth once daily., Disp: , Rfl:     dextroamphetamine-amphetamine (ADDERALL XR) 20 MG 24 hr capsule, Take one capsule  by mouth once daily every morning, Disp: 30 capsule, Rfl: 0    lisdexamfetamine (VYVANSE) 20 MG capsule, Take 1 capsule by mouth every morning, Disp: 30 capsule, Rfl: 0    lisdexamfetamine (VYVANSE) 20 MG capsule, Take 1 capsule by mouth every morning., Disp: 30 capsule, Rfl: 0    lisdexamfetamine (VYVANSE) 40 MG Cap, Take one cap po daily in am, Disp: 30 capsule, Rfl: 0    LORazepam (ATIVAN) 0.5 MG tablet, Take 1 tablet (0.5 mg total) by mouth daily as needed for acute anxiety, Disp: 30 tablet, Rfl: 2    venlafaxine (EFFEXOR-XR) 150 MG Cp24, Take one capsule by mouth once daily, Disp: 90 capsule, Rfl: 1    venlafaxine (EFFEXOR-XR) 75 MG 24 hr capsule, Take one capsule by mouth once daily, Disp: 90 capsule, Rfl: 1    ibuprofen (ADVIL,MOTRIN) 800 MG tablet, Take 1 tablet by mouth every 8 hours as needed for pain, Disp: 30 tablet, Rfl: 0        Allergies:   She is allergic to bactrim [sulfamethoxazole-trimethoprim].      Review of Systems:     Review of Systems   Constitutional:  Negative for chills and fever.   HENT:  Negative for ear pain and sore throat.    Respiratory:  Negative for cough, shortness of breath and wheezing.    Cardiovascular:  Negative for chest pain and palpitations.   Gastrointestinal:  Negative for constipation, diarrhea, nausea and vomiting.   Genitourinary:  Negative for dysuria and hematuria.   Musculoskeletal:  Negative for joint swelling and joint deformity.   Neurological:  Negative for dizziness and weakness.         Physical Exam:     Temp:             Blood Pressure:  116/74        Pulse:   94     Respirations:  18  Weight:   75.4 kg (166 lb 3.6 oz)  Height:   5' (1.524 m)  BMI:     Body mass index is 32.46 kg/m².    Physical Exam  Constitutional:       General: She is not in acute distress.  HENT:      Right Ear: Tympanic membrane normal.      Left Ear: Tympanic membrane normal.      Nose: No congestion or rhinorrhea.      Mouth/Throat:      Pharynx: No oropharyngeal exudate or  posterior oropharyngeal erythema.   Cardiovascular:      Rate and Rhythm: Normal rate and regular rhythm.   Pulmonary:      Effort: Pulmonary effort is normal. No respiratory distress.      Breath sounds: No wheezing.   Abdominal:      Palpations: Abdomen is soft.      Tenderness: There is no abdominal tenderness.   Skin:     General: Skin is warm.   Neurological:      General: No focal deficit present.      Mental Status: She is alert and oriented to person, place, and time.         Labs/Imaging/Testing:     Most recent CBC:  Lab Results   Component Value Date    WBC 7.76 04/05/2022    HGB 12.9 04/05/2022     04/05/2022    MCV 94 04/05/2022       Most recent Lipids:  Lab Results   Component Value Date    CHOL 180 04/05/2022    HDL 63 04/05/2022    LDLCALC 100.0 04/05/2022    TRIG 85 04/05/2022       Most recent Chemistry:  Lab Results   Component Value Date     (L) 04/05/2022    K 4.2 04/05/2022     04/05/2022    CO2 25 04/05/2022    BUN 13 04/05/2022    CREATININE 0.8 04/05/2022    GLU 86 04/05/2022    CALCIUM 9.2 04/05/2022    ALT 20 04/05/2022    AST 18 04/05/2022    ALKPHOS 98 04/05/2022    PROT 6.9 04/05/2022    ALBUMIN 4.0 04/05/2022       Other tests:  Lab Results   Component Value Date    TSH 1.466 04/05/2022    HGBA1C 4.8 03/16/2021             Assessment and Plan:     1. Well adult exam    2. Allergic rhinitis, unspecified seasonality, unspecified trigger  - Ambulatory referral/consult to Allergy; Future    3. Weight gain  - CBC Auto Differential; Future  - Comprehensive Metabolic Panel; Future  - Lipid Panel; Future  - Hemoglobin A1C; Future  - TSH; Future  - T4, Free; Future  - CORTISOL, 8AM; Future    4. Fatigue, unspecified type  - CBC Auto Differential; Future  - Comprehensive Metabolic Panel; Future  - Lipid Panel; Future  - Hemoglobin A1C; Future  - TSH; Future  - T4, Free; Future  - CORTISOL, 8AM; Future     Encouraged continued efforts at cutting back on calories, increasing  physical activity.  Will check labs to evaluate for thyroid disorder, cortisol excess.      RTC 12 mos or sooner roman Salvador MD  4/3/2023    This note was prepared using voice-recognition software.  Although efforts are made to proofread the note, some errors may persist in the final document.

## 2023-04-03 ENCOUNTER — PATIENT MESSAGE (OUTPATIENT)
Dept: ADMINISTRATIVE | Facility: HOSPITAL | Age: 33
End: 2023-04-03
Payer: COMMERCIAL

## 2023-04-03 ENCOUNTER — OFFICE VISIT (OUTPATIENT)
Dept: PRIMARY CARE CLINIC | Facility: CLINIC | Age: 33
End: 2023-04-03
Payer: COMMERCIAL

## 2023-04-03 ENCOUNTER — PATIENT MESSAGE (OUTPATIENT)
Dept: DERMATOLOGY | Facility: CLINIC | Age: 33
End: 2023-04-03
Payer: COMMERCIAL

## 2023-04-03 VITALS
HEART RATE: 94 BPM | RESPIRATION RATE: 18 BRPM | HEIGHT: 60 IN | SYSTOLIC BLOOD PRESSURE: 116 MMHG | WEIGHT: 166.25 LBS | DIASTOLIC BLOOD PRESSURE: 74 MMHG | OXYGEN SATURATION: 98 % | BODY MASS INDEX: 32.64 KG/M2

## 2023-04-03 DIAGNOSIS — Z00.00 WELL ADULT EXAM: ICD-10-CM

## 2023-04-03 DIAGNOSIS — R53.83 FATIGUE, UNSPECIFIED TYPE: ICD-10-CM

## 2023-04-03 DIAGNOSIS — R63.5 WEIGHT GAIN: ICD-10-CM

## 2023-04-03 DIAGNOSIS — J30.9 ALLERGIC RHINITIS, UNSPECIFIED SEASONALITY, UNSPECIFIED TRIGGER: Primary | ICD-10-CM

## 2023-04-03 PROCEDURE — 99999 PR PBB SHADOW E&M-EST. PATIENT-LVL V: CPT | Mod: PBBFAC,,, | Performed by: INTERNAL MEDICINE

## 2023-04-03 PROCEDURE — 3008F BODY MASS INDEX DOCD: CPT | Mod: CPTII,S$GLB,, | Performed by: INTERNAL MEDICINE

## 2023-04-03 PROCEDURE — 1159F MED LIST DOCD IN RCRD: CPT | Mod: CPTII,S$GLB,, | Performed by: INTERNAL MEDICINE

## 2023-04-03 PROCEDURE — 99395 PR PREVENTIVE VISIT,EST,18-39: ICD-10-PCS | Mod: S$GLB,,, | Performed by: INTERNAL MEDICINE

## 2023-04-03 PROCEDURE — 3074F SYST BP LT 130 MM HG: CPT | Mod: CPTII,S$GLB,, | Performed by: INTERNAL MEDICINE

## 2023-04-03 PROCEDURE — 3078F DIAST BP <80 MM HG: CPT | Mod: CPTII,S$GLB,, | Performed by: INTERNAL MEDICINE

## 2023-04-03 PROCEDURE — 1159F PR MEDICATION LIST DOCUMENTED IN MEDICAL RECORD: ICD-10-PCS | Mod: CPTII,S$GLB,, | Performed by: INTERNAL MEDICINE

## 2023-04-03 PROCEDURE — 3078F PR MOST RECENT DIASTOLIC BLOOD PRESSURE < 80 MM HG: ICD-10-PCS | Mod: CPTII,S$GLB,, | Performed by: INTERNAL MEDICINE

## 2023-04-03 PROCEDURE — 99395 PREV VISIT EST AGE 18-39: CPT | Mod: S$GLB,,, | Performed by: INTERNAL MEDICINE

## 2023-04-03 PROCEDURE — 99999 PR PBB SHADOW E&M-EST. PATIENT-LVL V: ICD-10-PCS | Mod: PBBFAC,,, | Performed by: INTERNAL MEDICINE

## 2023-04-03 PROCEDURE — 3074F PR MOST RECENT SYSTOLIC BLOOD PRESSURE < 130 MM HG: ICD-10-PCS | Mod: CPTII,S$GLB,, | Performed by: INTERNAL MEDICINE

## 2023-04-03 PROCEDURE — 3008F PR BODY MASS INDEX (BMI) DOCUMENTED: ICD-10-PCS | Mod: CPTII,S$GLB,, | Performed by: INTERNAL MEDICINE

## 2023-04-04 ENCOUNTER — LAB VISIT (OUTPATIENT)
Dept: LAB | Facility: HOSPITAL | Age: 33
End: 2023-04-04
Attending: INTERNAL MEDICINE
Payer: COMMERCIAL

## 2023-04-04 DIAGNOSIS — R53.83 FATIGUE, UNSPECIFIED TYPE: ICD-10-CM

## 2023-04-04 DIAGNOSIS — R63.5 WEIGHT GAIN: ICD-10-CM

## 2023-04-04 LAB
ALBUMIN SERPL BCP-MCNC: 4.1 G/DL (ref 3.5–5.2)
ALP SERPL-CCNC: 76 U/L (ref 55–135)
ALT SERPL W/O P-5'-P-CCNC: 17 U/L (ref 10–44)
ANION GAP SERPL CALC-SCNC: 7 MMOL/L (ref 8–16)
AST SERPL-CCNC: 17 U/L (ref 10–40)
BASOPHILS # BLD AUTO: 0.09 K/UL (ref 0–0.2)
BASOPHILS NFR BLD: 1.1 % (ref 0–1.9)
BILIRUB SERPL-MCNC: 0.4 MG/DL (ref 0.1–1)
BUN SERPL-MCNC: 11 MG/DL (ref 6–20)
CALCIUM SERPL-MCNC: 8.9 MG/DL (ref 8.7–10.5)
CHLORIDE SERPL-SCNC: 105 MMOL/L (ref 95–110)
CHOLEST SERPL-MCNC: 153 MG/DL (ref 120–199)
CHOLEST/HDLC SERPL: 3.6 {RATIO} (ref 2–5)
CO2 SERPL-SCNC: 24 MMOL/L (ref 23–29)
CORTIS SERPL-MCNC: 10.5 UG/DL (ref 4.3–22.4)
CREAT SERPL-MCNC: 0.8 MG/DL (ref 0.5–1.4)
DIFFERENTIAL METHOD: NORMAL
EOSINOPHIL # BLD AUTO: 0.5 K/UL (ref 0–0.5)
EOSINOPHIL NFR BLD: 6.2 % (ref 0–8)
ERYTHROCYTE [DISTWIDTH] IN BLOOD BY AUTOMATED COUNT: 13.2 % (ref 11.5–14.5)
EST. GFR  (NO RACE VARIABLE): >60 ML/MIN/1.73 M^2
ESTIMATED AVG GLUCOSE: 100 MG/DL (ref 68–131)
GLUCOSE SERPL-MCNC: 94 MG/DL (ref 70–110)
HBA1C MFR BLD: 5.1 % (ref 4–5.6)
HCT VFR BLD AUTO: 40.2 % (ref 37–48.5)
HDLC SERPL-MCNC: 43 MG/DL (ref 40–75)
HDLC SERPL: 28.1 % (ref 20–50)
HGB BLD-MCNC: 13 G/DL (ref 12–16)
IMM GRANULOCYTES # BLD AUTO: 0.01 K/UL (ref 0–0.04)
IMM GRANULOCYTES NFR BLD AUTO: 0.1 % (ref 0–0.5)
LDLC SERPL CALC-MCNC: 94.2 MG/DL (ref 63–159)
LYMPHOCYTES # BLD AUTO: 2.3 K/UL (ref 1–4.8)
LYMPHOCYTES NFR BLD: 26.9 % (ref 18–48)
MCH RBC QN AUTO: 30.8 PG (ref 27–31)
MCHC RBC AUTO-ENTMCNC: 32.3 G/DL (ref 32–36)
MCV RBC AUTO: 95 FL (ref 82–98)
MONOCYTES # BLD AUTO: 0.9 K/UL (ref 0.3–1)
MONOCYTES NFR BLD: 10.3 % (ref 4–15)
NEUTROPHILS # BLD AUTO: 4.7 K/UL (ref 1.8–7.7)
NEUTROPHILS NFR BLD: 55.4 % (ref 38–73)
NONHDLC SERPL-MCNC: 110 MG/DL
NRBC BLD-RTO: 0 /100 WBC
PLATELET # BLD AUTO: 282 K/UL (ref 150–450)
PMV BLD AUTO: 10.7 FL (ref 9.2–12.9)
POTASSIUM SERPL-SCNC: 4.1 MMOL/L (ref 3.5–5.1)
PROT SERPL-MCNC: 6.7 G/DL (ref 6–8.4)
RBC # BLD AUTO: 4.22 M/UL (ref 4–5.4)
SODIUM SERPL-SCNC: 136 MMOL/L (ref 136–145)
T4 FREE SERPL-MCNC: 0.78 NG/DL (ref 0.71–1.51)
TRIGL SERPL-MCNC: 79 MG/DL (ref 30–150)
TSH SERPL DL<=0.005 MIU/L-ACNC: 1.36 UIU/ML (ref 0.4–4)
WBC # BLD AUTO: 8.55 K/UL (ref 3.9–12.7)

## 2023-04-04 PROCEDURE — 85025 COMPLETE CBC W/AUTO DIFF WBC: CPT | Performed by: INTERNAL MEDICINE

## 2023-04-04 PROCEDURE — 80061 LIPID PANEL: CPT | Performed by: INTERNAL MEDICINE

## 2023-04-04 PROCEDURE — 36415 COLL VENOUS BLD VENIPUNCTURE: CPT | Performed by: INTERNAL MEDICINE

## 2023-04-04 PROCEDURE — 84443 ASSAY THYROID STIM HORMONE: CPT | Performed by: INTERNAL MEDICINE

## 2023-04-04 PROCEDURE — 80053 COMPREHEN METABOLIC PANEL: CPT | Performed by: INTERNAL MEDICINE

## 2023-04-04 PROCEDURE — 83036 HEMOGLOBIN GLYCOSYLATED A1C: CPT | Performed by: INTERNAL MEDICINE

## 2023-04-04 PROCEDURE — 84439 ASSAY OF FREE THYROXINE: CPT | Performed by: INTERNAL MEDICINE

## 2023-04-04 PROCEDURE — 82533 TOTAL CORTISOL: CPT | Performed by: INTERNAL MEDICINE

## 2023-04-21 ENCOUNTER — PATIENT MESSAGE (OUTPATIENT)
Dept: PRIMARY CARE CLINIC | Facility: CLINIC | Age: 33
End: 2023-04-21
Payer: COMMERCIAL

## 2023-04-21 NOTE — TELEPHONE ENCOUNTER
Please review my chart message, pt would like help tapering off of Effexor and switching to Wellbutrin do to weight gain.

## 2023-04-25 RX ORDER — VENLAFAXINE HYDROCHLORIDE 37.5 MG/1
37.5 CAPSULE, EXTENDED RELEASE ORAL DAILY
Qty: 30 CAPSULE | Refills: 11 | Status: SHIPPED | OUTPATIENT
Start: 2023-04-25 | End: 2023-09-14

## 2023-04-25 RX ORDER — BUPROPION HYDROCHLORIDE 150 MG/1
150 TABLET ORAL DAILY
Qty: 30 TABLET | Refills: 11 | Status: SHIPPED | OUTPATIENT
Start: 2023-04-25 | End: 2024-02-27

## 2023-05-02 ENCOUNTER — OFFICE VISIT (OUTPATIENT)
Dept: URGENT CARE | Facility: CLINIC | Age: 33
End: 2023-05-02
Payer: COMMERCIAL

## 2023-05-02 VITALS
BODY MASS INDEX: 32.64 KG/M2 | TEMPERATURE: 99 F | HEART RATE: 91 BPM | WEIGHT: 166.25 LBS | DIASTOLIC BLOOD PRESSURE: 80 MMHG | SYSTOLIC BLOOD PRESSURE: 125 MMHG | RESPIRATION RATE: 18 BRPM | HEIGHT: 60 IN | OXYGEN SATURATION: 97 %

## 2023-05-02 DIAGNOSIS — J06.9 VIRAL URI WITH COUGH: Primary | ICD-10-CM

## 2023-05-02 DIAGNOSIS — R09.81 NASAL CONGESTION: ICD-10-CM

## 2023-05-02 LAB
CTP QC/QA: YES
SARS-COV-2 AG RESP QL IA.RAPID: NEGATIVE

## 2023-05-02 PROCEDURE — 99213 PR OFFICE/OUTPT VISIT, EST, LEVL III, 20-29 MIN: ICD-10-PCS | Mod: S$GLB,,,

## 2023-05-02 PROCEDURE — 87811 SARS CORONAVIRUS 2 ANTIGEN POCT, MANUAL READ: ICD-10-PCS | Mod: QW,S$GLB,,

## 2023-05-02 PROCEDURE — 99213 OFFICE O/P EST LOW 20 MIN: CPT | Mod: S$GLB,,,

## 2023-05-02 PROCEDURE — 87811 SARS-COV-2 COVID19 W/OPTIC: CPT | Mod: QW,S$GLB,,

## 2023-05-02 RX ORDER — BENZONATATE 200 MG/1
200 CAPSULE ORAL 3 TIMES DAILY PRN
Qty: 30 CAPSULE | Refills: 0 | Status: SHIPPED | OUTPATIENT
Start: 2023-05-02 | End: 2023-05-14

## 2023-05-02 RX ORDER — IPRATROPIUM BROMIDE 42 UG/1
2 SPRAY, METERED NASAL 4 TIMES DAILY
Qty: 15 ML | Refills: 0 | Status: SHIPPED | OUTPATIENT
Start: 2023-05-02 | End: 2023-05-11

## 2023-05-02 RX ORDER — PROMETHAZINE HYDROCHLORIDE AND DEXTROMETHORPHAN HYDROBROMIDE 6.25; 15 MG/5ML; MG/5ML
5 SYRUP ORAL EVERY 4 HOURS PRN
Qty: 180 ML | Refills: 0 | Status: SHIPPED | OUTPATIENT
Start: 2023-05-02 | End: 2023-05-11

## 2023-05-02 NOTE — PATIENT INSTRUCTIONS
- Rest.    - Drink plenty of fluids.  - Viral upper respiratory infections typically run their course in 10-14 days.     - You can take plain Mucinex (guaifenesin) 1200 mg twice a day to help loosen mucous.     - If you do NOT have high blood pressure, you may use a decongestant form (D)  of this medication (ie. Claritin- D, zyrtec-D, allegra-D) or if you do not take the D form, you can take sudafed (pseudoephedrine) over the counter, which is a decongestant. Do NOT take two decongestant (D) medications at the same time (such as mucinex-D and claritin-D or plain sudafed and claritin D). Dextromethorphan (DM) is a cough suppressant over the counter (ie. mucinex DM, robitussin, delsym; dayquil/nyquil has DM as well.)     - You can use Flonase (fluticasone) nasal spray as directed for sinus congestion and postnasal drip. This is a steroid nasal spray that works locally over time to decrease the inflammation in your nose/sinuses and help with allergic symptoms. This is not an quick- relief spray like afrin, but it works well if used daily.  Discontinue if you develop nose bleed  - Use nasal saline prior to Flonase.  - Use Ocean Spray Nasal Saline 1-3 puffs each nostril every 2-3 hours then blow out onto tissue. This is to irrigate the nasal passage way to clear the sinus openings. Use until sinus problem resolved.    - A Neti Pot with sterile saline can help break up nasal congestion and give relief.      - Warm salt water gargles can help with sore throat     - Warm tea with honey can help with sore throat and cough. Honey is a natural cough suppressant.      - Acetaminophen (tylenol) or Ibuprofen (advil,motrin) as directed as needed for fever/pain. Avoid tylenol if you have a history of liver disease. Do not take ibuprofen if you have a history of GI bleeding, kidney disease, or if you take blood thinners.   - Ibuprofen dosing for adults: 400 mg by mouth every 4-6 hours as needed. Max: 2400 mg/day; Info: use lowest  effective dose, shortest effective treatment duration; give w/ food if GI upset occurs.  - Tylenol dosing for adults: [By mouth route, immediate-release form] Dose: 325-1000 mg by mouth every 4-6h as needed; Max: 1 g/4h and 4 g/day from all sources. [By mouth route, extended-release form] Dose: 650-1300 mg Extended Release by mouth every 8h as needed; Max: 4 g/day from all sources.     - You must understand that you have received an Urgent Care treatment only and that you may be released before all of your medical problems are known or treated.   - You, the patient, will arrange for follow up care as instructed.   - If your condition worsens or fails to improve we recommend that you receive another evaluation at the ER immediately or contact your PCP to discuss your concerns or return here.   - Follow up with your PCP or specialty clinic as directed in the next 1-2 weeks if not improved or as needed.  You can call [phone] to schedule an appointment with the appropriate provider.    If your symptoms do not improve or worsen, go to the emergency room immediately.

## 2023-05-02 NOTE — PROGRESS NOTES
Subjective:      Patient ID: Tamara Aldana is a 32 y.o. female.    Vitals:  height is 5' (1.524 m) and weight is 75.4 kg (166 lb 3.6 oz). Her oral temperature is 98.8 °F (37.1 °C). Her blood pressure is 125/80 and her pulse is 91. Her respiration is 18 and oxygen saturation is 97%.     Chief Complaint: URI    Patient presents today with a cough, chills and fatigue starting yesterday. She is having ear pressure and sore throat. Denies shortness of breath or productive cough.   Patient tried taking dayquil and had no relief.     URI   This is a new problem. The current episode started in the past 7 days. The problem has been unchanged. There has been no fever. Associated symptoms include congestion, coughing, ear pain, a plugged ear sensation, rhinorrhea, sneezing and a sore throat. Pertinent negatives include no nausea, sinus pain or vomiting. She has tried decongestant for the symptoms. The treatment provided no relief.   Constitution: Negative for chills and sweating.   HENT:  Positive for ear pain, congestion, postnasal drip and sore throat. Negative for sinus pain and sinus pressure.    Eyes:  Negative for eye pain and eye redness.   Respiratory:  Positive for cough. Negative for sputum production and shortness of breath.    Gastrointestinal:  Negative for nausea and vomiting.   Allergic/Immunologic: Positive for sneezing.   Neurological:  Negative for disorientation and altered mental status.   Psychiatric/Behavioral:  Negative for altered mental status and disorientation.     Objective:     Physical Exam   Constitutional: She is oriented to person, place, and time. She appears well-developed. She is cooperative.  Non-toxic appearance. She does not appear ill. No distress.      Comments:Patient sits comfortably in exam chair. Answers questions in complete sentences. Does not show any signs of distress or discoloration.        HENT:   Head: Normocephalic and atraumatic.   Ears:   Right Ear: Hearing, external  ear and ear canal normal. A middle ear effusion is present. impacted cerumen  Left Ear: Hearing, external ear and ear canal normal. A middle ear effusion is present. impacted cerumen  Nose: Mucosal edema, rhinorrhea and congestion present. No nasal deformity. No epistaxis. Right sinus exhibits no maxillary sinus tenderness and no frontal sinus tenderness. Left sinus exhibits no maxillary sinus tenderness and no frontal sinus tenderness.   Mouth/Throat: Uvula is midline and mucous membranes are normal. No trismus in the jaw. Normal dentition. No uvula swelling. Posterior oropharyngeal erythema present. No oropharyngeal exudate or posterior oropharyngeal edema. No tonsillar exudate.   Eyes: Conjunctivae and lids are normal. No scleral icterus.   Neck: Trachea normal and phonation normal. Neck supple. No edema present. No erythema present. No neck rigidity present.   Cardiovascular: Normal rate, regular rhythm, normal heart sounds and normal pulses.   Pulmonary/Chest: Effort normal and breath sounds normal. No stridor. No respiratory distress. She has no decreased breath sounds. She has no wheezes. She has no rhonchi. She has no rales.   Abdominal: Normal appearance.   Musculoskeletal: Normal range of motion.         General: No deformity. Normal range of motion.   Lymphadenopathy:     She has no cervical adenopathy.        Right cervical: No superficial cervical, no deep cervical and no posterior cervical adenopathy present.       Left cervical: No superficial cervical, no deep cervical and no posterior cervical adenopathy present.   Neurological: She is alert and oriented to person, place, and time. She exhibits normal muscle tone. Coordination normal.   Skin: Skin is warm, dry, intact, not diaphoretic and not pale.   Psychiatric: Her speech is normal and behavior is normal. Judgment and thought content normal.   Nursing note and vitals reviewed.  Results for orders placed or performed in visit on 05/02/23   SARS  Coronavirus 2 Antigen, POCT Manual Read   Result Value Ref Range    SARS Coronavirus 2 Antigen Negative Negative     Acceptable Yes        Assessment:     1. Viral URI with cough    2. Nasal congestion        Plan:       Viral URI with cough  -     ipratropium (ATROVENT) 42 mcg (0.06 %) nasal spray; 2 sprays by Each Nostril route 4 (four) times daily. for 7 days  Dispense: 15 mL; Refill: 0  -     promethazine-dextromethorphan (PROMETHAZINE-DM) 6.25-15 mg/5 mL Syrp; Take 5 mLs by mouth every 4 (four) hours as needed (cough).  Dispense: 180 mL; Refill: 0  -     benzonatate (TESSALON) 200 MG capsule; Take 1 capsule (200 mg total) by mouth 3 (three) times daily as needed for Cough.  Dispense: 30 capsule; Refill: 0    Nasal congestion  -     SARS Coronavirus 2 Antigen, POCT Manual Read  -     ipratropium (ATROVENT) 42 mcg (0.06 %) nasal spray; 2 sprays by Each Nostril route 4 (four) times daily. for 7 days  Dispense: 15 mL; Refill: 0                Patient Instructions   - Rest.    - Drink plenty of fluids.  - Viral upper respiratory infections typically run their course in 10-14 days.     - You can take plain Mucinex (guaifenesin) 1200 mg twice a day to help loosen mucous.     - If you do NOT have high blood pressure, you may use a decongestant form (D)  of this medication (ie. Claritin- D, zyrtec-D, allegra-D) or if you do not take the D form, you can take sudafed (pseudoephedrine) over the counter, which is a decongestant. Do NOT take two decongestant (D) medications at the same time (such as mucinex-D and claritin-D or plain sudafed and claritin D). Dextromethorphan (DM) is a cough suppressant over the counter (ie. mucinex DM, robitussin, delsym; dayquil/nyquil has DM as well.)     - You can use Flonase (fluticasone) nasal spray as directed for sinus congestion and postnasal drip. This is a steroid nasal spray that works locally over time to decrease the inflammation in your nose/sinuses and help with  allergic symptoms. This is not an quick- relief spray like afrin, but it works well if used daily.  Discontinue if you develop nose bleed  - Use nasal saline prior to Flonase.  - Use Ocean Spray Nasal Saline 1-3 puffs each nostril every 2-3 hours then blow out onto tissue. This is to irrigate the nasal passage way to clear the sinus openings. Use until sinus problem resolved.    - A Neti Pot with sterile saline can help break up nasal congestion and give relief.      - Warm salt water gargles can help with sore throat     - Warm tea with honey can help with sore throat and cough. Honey is a natural cough suppressant.      - Acetaminophen (tylenol) or Ibuprofen (advil,motrin) as directed as needed for fever/pain. Avoid tylenol if you have a history of liver disease. Do not take ibuprofen if you have a history of GI bleeding, kidney disease, or if you take blood thinners.   - Ibuprofen dosing for adults: 400 mg by mouth every 4-6 hours as needed. Max: 2400 mg/day; Info: use lowest effective dose, shortest effective treatment duration; give w/ food if GI upset occurs.  - Tylenol dosing for adults: [By mouth route, immediate-release form] Dose: 325-1000 mg by mouth every 4-6h as needed; Max: 1 g/4h and 4 g/day from all sources. [By mouth route, extended-release form] Dose: 650-1300 mg Extended Release by mouth every 8h as needed; Max: 4 g/day from all sources.     - You must understand that you have received an Urgent Care treatment only and that you may be released before all of your medical problems are known or treated.   - You, the patient, will arrange for follow up care as instructed.   - If your condition worsens or fails to improve we recommend that you receive another evaluation at the ER immediately or contact your PCP to discuss your concerns or return here.   - Follow up with your PCP or specialty clinic as directed in the next 1-2 weeks if not improved or as needed.  You can call [phone] to schedule  an appointment with the appropriate provider.    If your symptoms do not improve or worsen, go to the emergency room immediately.

## 2023-05-08 ENCOUNTER — OFFICE VISIT (OUTPATIENT)
Dept: URGENT CARE | Facility: CLINIC | Age: 33
End: 2023-05-08
Payer: COMMERCIAL

## 2023-05-08 VITALS
TEMPERATURE: 99 F | HEIGHT: 60 IN | BODY MASS INDEX: 32.59 KG/M2 | WEIGHT: 166 LBS | HEART RATE: 92 BPM | RESPIRATION RATE: 20 BRPM | OXYGEN SATURATION: 98 % | SYSTOLIC BLOOD PRESSURE: 131 MMHG | DIASTOLIC BLOOD PRESSURE: 90 MMHG

## 2023-05-08 DIAGNOSIS — R50.9 SUBJECTIVE FEVER: Primary | ICD-10-CM

## 2023-05-08 DIAGNOSIS — R05.1 ACUTE COUGH: ICD-10-CM

## 2023-05-08 DIAGNOSIS — H93.8X3 EAR CONGESTION, BILATERAL: ICD-10-CM

## 2023-05-08 LAB
CTP QC/QA: YES
SARS-COV-2 AG RESP QL IA.RAPID: NEGATIVE

## 2023-05-08 PROCEDURE — 99213 OFFICE O/P EST LOW 20 MIN: CPT | Mod: S$GLB,,,

## 2023-05-08 PROCEDURE — 87811 SARS-COV-2 COVID19 W/OPTIC: CPT | Mod: QW,S$GLB,,

## 2023-05-08 PROCEDURE — 99213 PR OFFICE/OUTPT VISIT, EST, LEVL III, 20-29 MIN: ICD-10-PCS | Mod: S$GLB,,,

## 2023-05-08 PROCEDURE — 87811 SARS CORONAVIRUS 2 ANTIGEN POCT, MANUAL READ: ICD-10-PCS | Mod: QW,S$GLB,,

## 2023-05-08 RX ORDER — AZITHROMYCIN 250 MG/1
250 TABLET, FILM COATED ORAL DAILY
Qty: 6 TABLET | Refills: 0 | Status: SHIPPED | OUTPATIENT
Start: 2023-05-08 | End: 2023-05-13

## 2023-05-08 NOTE — PATIENT INSTRUCTIONS
Reviewed negative COVID-19 virus test with patient who verbalized understanding.  Advised patient that symptoms are indicative of an upper respiratory infection which is viral in nature and should be treated symptomatically.  We discussed over-the-counter medications as well as home remedies to help with current symptoms.  We also discussed a wait and see antibiotic plan which the patient verbalized understanding.  Patient educational handouts also included in discharge paperwork for patient who verbalized understanding agrees with plan of care.  They deny any further questions or concerns at this time.  Patient exits exam room in no acute distress.      PLEASE READ YOUR DISCHARGE INSTRUCTIONS ENTIRELY AS IT CONTAINS IMPORTANT INFORMATION.    - Take antibiotic as indicated  - Please drink plenty of fluids.  - Please get plenty of rest.  - You can take plain Mucinex (guaifenesin) 1200 mg twice a day to help loosen mucous.   - Use over the counter Flonase as directed  Please return here or go to the Emergency Department for any concerns or worsening of condition.  - Please take an over the counter antihistamine medication (Allegra/Claritin/Zyrtec/Xyzal) of your choice as directed. These are antihistamines that can help with runny nose, nasal congestion, sneezing, and helps to dry up post-nasal drip, which usually causes sore throat and cough.    -If you do NOT have high blood pressure, you may use a decongestant form (D)  of this medication (ie. Claritin- D, zyrtec-D, allegra-D, Mucinex-D) or if you do not take the D form, you can take sudafed (pseudoephedrine) over the counter, which is a decongestant. Do NOT take two decongestant (D) medications at the same time (such as mucinex-D and claritin-D or plain sudafed and claritin D). Dextromethorphan (DM) is a cough suppressant over the counter (ie. mucinex DM, robitussin, delsym; dayquil/nyquil has DM as well.)    If you do have Hypertension or palpitations, it is safe  to take Coricidin HBP for relief of sinus symptoms.    - If not allergic, please take over the counter Tylenol (Acetaminophen) and/or Motrin (Ibuprofen) as directed for control of pain and/or fever.  Avoid tylenol if you have a history of liver disease. Do not take ibuprofen if you have a history of GI bleeding, kidney disease, or if you take blood thinners.  Please follow up with your primary care doctor or specialist as needed.    -IF YOU RECEIVED PRESCRIPTION COUGH SUPPRESSANTS: Take prescription cough meds (pills) as prescribed; take prescription cough syrup at night as needed for cough.  Do not take both the prescribed cough pills and syrup at the same time or within 6 hours of each other.  Do not take the cough syrup with any other sedative medication as it can can cause drowsiness. Do not operate any heavy machinery, drink or drive while taking the cough syrup.    Try taking half a dose first of the cough syrup to see how it affects you.     Sore throat recommendations: Warm fluids, warm salt water gargles, throat lozenges, tea, honey, soup, rest, hydration.    Use over the counter flonase: one spray each nostril twice daily OR two sprays each nostril once daily for sinus congestion and postnasal drip. This is a steroid nasal spray that works locally over time to decrease the inflammation in your nose/sinuses and help with allergic symptoms. This is not an quick- relief spray like afrin, but it works well if used daily.  Discontinue if you develop nose bleed    Sinus rinses DO NOT USE TAP WATER, if you must, water must be a rolling boil for 1 minute, let it cool, then use.  May use distilled water, or over the counter nasal saline rinses.  Vics vapor rub in shower to help open nasal passages.  May use nasal gel to keep passages moisturized.  May use Nasal saline sprays during the day for added relief of congestion.   For those who go to the gym, please do not use the sauna or steam room now to clear  sinuses.    If you  smoke, please stop smoking.    Please return or see your primary care doctor if you develop new or worsening symptoms.     Please arrange follow up with your primary medical clinic as soon as possible. You must understand that you've received an Urgent Care treatment only and that you may be released before all of your medical problems are known or treated. You, the patient, will arrange for follow up as instructed. If your symptoms worsen or fail to improve you should go to the Emergency Room.

## 2023-05-08 NOTE — PROGRESS NOTES
Subjective:      Patient ID: Tamara Aldana is a 32 y.o. female.    Vitals:  height is 5' (1.524 m) and weight is 75.3 kg (166 lb). Her temperature is 98.9 °F (37.2 °C). Her blood pressure is 131/90 (abnormal) and her pulse is 92. Her respiration is 20 and oxygen saturation is 98%.     Chief Complaint: Cough    This is a 32 y.o. female who presents today with a chief complaint of cold symptoms that began a week ago. She states that she was seen six days ago and given tessalon perls, promethazine DM. She says that her symptoms have not improved and her throat has started to get irritated from coughing.  States she is able to blow her nose so the decongestant is working but she is only able to take it at night. She's complaining of a fever, chills, fatigue, and a productive cough. Her fever has gotten as high as 102.0. She's been taking advil to help relieve her symptoms. States she was taking zyrtec and Sudafed but stopped taking it earlier in the week. States her main concern was pneumonia since her son is sick and her  was diagnosed with it. Allergic to bactrim.      Cough  This is a recurrent problem. The current episode started in the past 7 days. The problem has been gradually worsening. The problem occurs constantly. The cough is Productive of sputum. Associated symptoms include chills, a fever and a sore throat (irritated from coughing). Pertinent negatives include no chest pain, ear congestion, ear pain, eye redness, headaches, heartburn, hemoptysis, myalgias, nasal congestion, postnasal drip, rash, rhinorrhea, shortness of breath, sweats, weight loss or wheezing. Nothing aggravates the symptoms. Treatments tried: ibuprofen. The treatment provided mild relief.   Constitution: Positive for chills, fatigue and fever.   HENT:  Positive for congestion (ears) and sore throat (irritated from coughing). Negative for ear pain, ear discharge, postnasal drip, sinus pain, sinus pressure, trouble swallowing and  voice change.    Neck: Negative for neck pain and neck stiffness.   Cardiovascular:  Negative for chest pain.   Eyes:  Negative for eye itching and eye redness.   Respiratory:  Positive for cough and sputum production. Negative for bloody sputum, shortness of breath and wheezing.    Gastrointestinal:  Negative for nausea and heartburn.   Musculoskeletal:  Negative for muscle ache.   Skin:  Negative for rash.   Allergic/Immunologic: Negative for sneezing.   Neurological:  Negative for headaches.    Objective:     Vitals:    05/08/23 1752   BP: (!) 131/90   Pulse: 92   Resp: 20   Temp: 98.9 °F (37.2 °C)       Physical Exam   Constitutional: She is oriented to person, place, and time. She appears well-developed. She is cooperative.  Non-toxic appearance. She does not appear ill. No distress.   HENT:   Head: Normocephalic and atraumatic.   Ears:   Right Ear: Hearing, tympanic membrane, external ear and ear canal normal. impacted cerumen  Left Ear: Hearing, tympanic membrane, external ear and ear canal normal. impacted cerumen  Nose: Nose normal. No mucosal edema, rhinorrhea or nasal deformity. No epistaxis. Right sinus exhibits no maxillary sinus tenderness and no frontal sinus tenderness. Left sinus exhibits no maxillary sinus tenderness and no frontal sinus tenderness.   Mouth/Throat: Uvula is midline, oropharynx is clear and moist and mucous membranes are normal. Mucous membranes are moist. No trismus in the jaw. Normal dentition. No uvula swelling. No oropharyngeal exudate, posterior oropharyngeal edema, posterior oropharyngeal erythema or cobblestoning. No tonsillar exudate.   Eyes: Conjunctivae and lids are normal. Pupils are equal, round, and reactive to light. Right eye exhibits no discharge. Left eye exhibits no discharge. No scleral icterus.   Neck: Trachea normal and phonation normal. Neck supple. No edema present. No erythema present. No neck rigidity present.   Cardiovascular: Normal rate, regular rhythm,  normal heart sounds and normal pulses.   Pulmonary/Chest: Effort normal and breath sounds normal. No respiratory distress. She has no decreased breath sounds. She has no wheezes. She has no rhonchi. She has no rales.   Abdominal: Normal appearance.   Musculoskeletal: Normal range of motion.         General: No deformity. Normal range of motion.   Lymphadenopathy:     She has cervical adenopathy.   Neurological: She is alert and oriented to person, place, and time. She exhibits normal muscle tone. Coordination normal.   Skin: Skin is warm, dry, intact, not diaphoretic and not pale.   Psychiatric: Her speech is normal and behavior is normal. Judgment and thought content normal.   Nursing note and vitals reviewed.    Assessment:     1. Subjective fever    2. Acute cough    3. Ear congestion, bilateral      Results for orders placed or performed in visit on 05/08/23   SARS Coronavirus 2 Antigen, POCT Manual Read   Result Value Ref Range    SARS Coronavirus 2 Antigen Negative Negative     Acceptable Yes        Plan:       Subjective fever  -     SARS Coronavirus 2 Antigen, POCT Manual Read  -     azithromycin (Z-HIEU) 250 MG tablet; Take 1 tablet (250 mg total) by mouth once daily. Take 2 tablets by mouth on day 1, then one tablet daily on days 2-5. for 5 days  Dispense: 6 tablet; Refill: 0    Acute cough  -     SARS Coronavirus 2 Antigen, POCT Manual Read  -     azithromycin (Z-HIEU) 250 MG tablet; Take 1 tablet (250 mg total) by mouth once daily. Take 2 tablets by mouth on day 1, then one tablet daily on days 2-5. for 5 days  Dispense: 6 tablet; Refill: 0    Ear congestion, bilateral        Patient Instructions   Reviewed negative COVID-19 virus test with patient who verbalized understanding.  Advised patient that symptoms are indicative of an upper respiratory infection which is viral in nature and should be treated symptomatically.  We discussed over-the-counter medications as well as home remedies to  help with current symptoms.  We also discussed a wait and see antibiotic plan which the patient verbalized understanding.  Patient educational handouts also included in discharge paperwork for patient who verbalized understanding agrees with plan of care.  They deny any further questions or concerns at this time.  Patient exits exam room in no acute distress.      PLEASE READ YOUR DISCHARGE INSTRUCTIONS ENTIRELY AS IT CONTAINS IMPORTANT INFORMATION.    - Take antibiotic as indicated  - Please drink plenty of fluids.  - Please get plenty of rest.  - You can take plain Mucinex (guaifenesin) 1200 mg twice a day to help loosen mucous.   - Use over the counter Flonase as directed  Please return here or go to the Emergency Department for any concerns or worsening of condition.  - Please take an over the counter antihistamine medication (Allegra/Claritin/Zyrtec/Xyzal) of your choice as directed. These are antihistamines that can help with runny nose, nasal congestion, sneezing, and helps to dry up post-nasal drip, which usually causes sore throat and cough.    -If you do NOT have high blood pressure, you may use a decongestant form (D)  of this medication (ie. Claritin- D, zyrtec-D, allegra-D, Mucinex-D) or if you do not take the D form, you can take sudafed (pseudoephedrine) over the counter, which is a decongestant. Do NOT take two decongestant (D) medications at the same time (such as mucinex-D and claritin-D or plain sudafed and claritin D). Dextromethorphan (DM) is a cough suppressant over the counter (ie. mucinex DM, robitussin, delsym; dayquil/nyquil has DM as well.)    If you do have Hypertension or palpitations, it is safe to take Coricidin HBP for relief of sinus symptoms.    - If not allergic, please take over the counter Tylenol (Acetaminophen) and/or Motrin (Ibuprofen) as directed for control of pain and/or fever.  Avoid tylenol if you have a history of liver disease. Do not take ibuprofen if you have a  history of GI bleeding, kidney disease, or if you take blood thinners.  Please follow up with your primary care doctor or specialist as needed.    -IF YOU RECEIVED PRESCRIPTION COUGH SUPPRESSANTS: Take prescription cough meds (pills) as prescribed; take prescription cough syrup at night as needed for cough.  Do not take both the prescribed cough pills and syrup at the same time or within 6 hours of each other.  Do not take the cough syrup with any other sedative medication as it can can cause drowsiness. Do not operate any heavy machinery, drink or drive while taking the cough syrup.    Try taking half a dose first of the cough syrup to see how it affects you.     Sore throat recommendations: Warm fluids, warm salt water gargles, throat lozenges, tea, honey, soup, rest, hydration.    Use over the counter flonase: one spray each nostril twice daily OR two sprays each nostril once daily for sinus congestion and postnasal drip. This is a steroid nasal spray that works locally over time to decrease the inflammation in your nose/sinuses and help with allergic symptoms. This is not an quick- relief spray like afrin, but it works well if used daily.  Discontinue if you develop nose bleed    Sinus rinses DO NOT USE TAP WATER, if you must, water must be a rolling boil for 1 minute, let it cool, then use.  May use distilled water, or over the counter nasal saline rinses.  Vics vapor rub in shower to help open nasal passages.  May use nasal gel to keep passages moisturized.  May use Nasal saline sprays during the day for added relief of congestion.   For those who go to the gym, please do not use the sauna or steam room now to clear sinuses.    If you  smoke, please stop smoking.    Please return or see your primary care doctor if you develop new or worsening symptoms.     Please arrange follow up with your primary medical clinic as soon as possible. You must understand that you've received an Urgent Care treatment only and  that you may be released before all of your medical problems are known or treated. You, the patient, will arrange for follow up as instructed. If your symptoms worsen or fail to improve you should go to the Emergency Room.

## 2023-06-01 ENCOUNTER — PATIENT MESSAGE (OUTPATIENT)
Dept: PRIMARY CARE CLINIC | Facility: CLINIC | Age: 33
End: 2023-06-01
Payer: COMMERCIAL

## 2023-06-11 ENCOUNTER — PATIENT MESSAGE (OUTPATIENT)
Dept: DERMATOLOGY | Facility: CLINIC | Age: 33
End: 2023-06-11
Payer: COMMERCIAL

## 2023-08-07 ENCOUNTER — PATIENT MESSAGE (OUTPATIENT)
Dept: PRIMARY CARE CLINIC | Facility: CLINIC | Age: 33
End: 2023-08-07
Payer: COMMERCIAL

## 2023-08-07 DIAGNOSIS — R10.9 ABDOMINAL PAIN, UNSPECIFIED ABDOMINAL LOCATION: Primary | ICD-10-CM

## 2023-08-07 NOTE — PROGRESS NOTES
[facility] Progress Note  2023    This was a virtual visit conducted via webcam.      Reason for Visit:      is having diarrhea  Time spent on visit today: 12 minutes    History of Present Illness:     Diarrhea  Past 2 months, lots of diarrhea, (loose stools- watery at times)  Never formed anymore  No recent change in diet  No travel other than [place]  No fevers, doesn't have nausea, feels ok otherwise.  Lost about 12 pounds, but has been trying to lose weight by watching what she eats.    Gets abdominal pain, sharp, in epigastric area, also crampy at times.  Lasts a few minutes and then goes away.  Happens while eating and after eating.  Having a bowel movement doesn't make pain resolve.  The cramping tends to go away by itself after 5-10 minutes  There was some blood in tissue when wiping (Bright red) just once    Grandfather had colon cancer.  Father has polyps and diverticulitis.  No family history of inflammatory bowel disease    Has not tried any OTC meds for this    Problem List:     Problem List:  2022: Anxiety  2021: Severe preeclampsia  2021: S/P  section  2021: Pre-eclampsia in third trimester  2021: Depression  2021: PROM (premature rupture of membranes)  2018: Attention-deficit hyperactivity disorder, unspecified type  2013: Exposure to blood or body fluid        Medications:       Current Outpatient Medications:     buPROPion (WELLBUTRIN XL) 150 MG TB24 tablet, Take 1 tablet (150 mg total) by mouth once daily., Disp: 30 tablet, Rfl: 11    buPROPion (WELLBUTRIN XL) 150 MG TB24 tablet, Take 1 tablet (150 mg total) by mouth., Disp: 30 tablet, Rfl: 2    busPIRone (BUSPAR) 10 MG tablet, Take 1 tablet (10 mg total) by mouth 3 (three) times daily as needed., Disp: 90 tablet, Rfl: 1    cetirizine (ZYRTEC) 10 MG tablet, Take 10 mg by mouth once daily., Disp: , Rfl:     dextroamphetamine-amphetamine (ADDERALL XR) 20 MG 24 hr capsule, Take one capsule  by mouth once daily every morning (Patient not taking: Reported on 5/8/2023), Disp: 30 capsule, Rfl: 0    ibuprofen (ADVIL,MOTRIN) 800 MG tablet, Take 1 tablet by mouth every 8 hours as needed for pain (Patient not taking: Reported on 5/8/2023), Disp: 30 tablet, Rfl: 0    lisdexamfetamine (VYVANSE) 20 MG capsule, Take 1 capsule by mouth every morning (Patient not taking: Reported on 5/8/2023), Disp: 30 capsule, Rfl: 0    lisdexamfetamine (VYVANSE) 20 MG capsule, Take 1 capsule by mouth every morning., Disp: 30 capsule, Rfl: 0    lisdexamfetamine (VYVANSE) 40 MG Cap, Take one capsule by mouth daily in the morning. (Patient not taking: Reported on 5/8/2023), Disp: 30 capsule, Rfl: 0    LORazepam (ATIVAN) 0.5 MG tablet, Take 1 tablet (0.5 mg total) by mouth daily as needed for acute anxiety, Disp: 30 tablet, Rfl: 2    LORazepam (ATIVAN) 0.5 MG tablet, Take one tab daily as needed for acute anxiety, Disp: 30 tablet, Rfl: 2    sodium fluoride-pot nitrate (PREVIDENT 5000 SENSITIVE) 1.1-5 % Pste, Use 1 to 2 times per day.  Brush, floss, mouthrinse as normal prior to using this.  Do not rinse out after use.  Do not eat or drink for 30 minutes after use., Disp: 100 mL, Rfl: 0    venlafaxine (EFFEXOR XR) 37.5 MG 24 hr capsule, Take 1 capsule (37.5 mg total) by mouth once daily., Disp: 30 capsule, Rfl: 11    venlafaxine (EFFEXOR-XR) 75 MG 24 hr capsule, Take one capsule by mouth once daily (Patient not taking: Reported on 5/8/2023), Disp: 90 capsule, Rfl: 1      Review of Systems:     Review of Systems   Constitutional:  Negative for fever.   Gastrointestinal:  Positive for abdominal pain, diarrhea and nausea. Negative for constipation and vomiting.   Genitourinary:  Negative for dysuria, frequency and hematuria.   Musculoskeletal:  Negative for arthralgias and myalgias.   Neurological:  Negative for headaches.           Physical Exam:     Pt is alert and oriented.  Speech is clear and coherent.  Speaking in complete  sentences.  No distress.  Mood and affect are normal.      Assessment and Plan:     1. Diarrhea, unspecified type  - Giardia / Cryptosporidum, EIA; Future  - Stool culture; Future  - Cyclospora; Future  - Fecal Immunochemical Test (iFOBT); Future  - Clostridium difficile EIA; Future    Will get stool studies.  If no infectious cause identified, then will plan to refer to GI for possible CSC  May try immodium prn otc.    Follow up if no improvement or worsening     Wang Salvador MD  8/8/2023    This note was prepared using voice-recognition software.  Although efforts are made to proofread the note, some errors may persist in the final document.    Dr. Salvador's [place] License number is [id number]  This was a virtual (audio/video visit) in lieu of in-person visit in context of the coronavirus emergency.      Patient/Family members identity was confirmed, and confidentiality/privacy confirmed prior to visit. Verbal informed consent was obtained from the patient's legal guardian or patient when appropriate to conduct this virtual visit.  They authorized me to provide medical care and voiced understanding of the risks, benefits, and alternatives of virtual care.  Guardian understands the limitations inherent of a virtual visit, that they may choose to be seen in person if desired or needed, and that they may halt the virtual visit at any time for any reason.     Originating Site: Patient's home   Distant Site:  [facility]     I certify that this visit was done via secure two-way simultaneous audio and video transmission with informed consent of the patient and/or guardian. Over 50% of the time was counseling or coordinating care.

## 2023-08-08 ENCOUNTER — OFFICE VISIT (OUTPATIENT)
Dept: PRIMARY CARE CLINIC | Facility: CLINIC | Age: 33
End: 2023-08-08
Payer: COMMERCIAL

## 2023-08-08 DIAGNOSIS — R19.7 DIARRHEA, UNSPECIFIED TYPE: Primary | ICD-10-CM

## 2023-08-08 PROCEDURE — 99214 PR OFFICE/OUTPT VISIT, EST, LEVL IV, 30-39 MIN: ICD-10-PCS | Mod: 95,,, | Performed by: INTERNAL MEDICINE

## 2023-08-08 PROCEDURE — 3044F PR MOST RECENT HEMOGLOBIN A1C LEVEL <7.0%: ICD-10-PCS | Mod: CPTII,95,, | Performed by: INTERNAL MEDICINE

## 2023-08-08 PROCEDURE — 99214 OFFICE O/P EST MOD 30 MIN: CPT | Mod: 95,,, | Performed by: INTERNAL MEDICINE

## 2023-08-08 PROCEDURE — 3044F HG A1C LEVEL LT 7.0%: CPT | Mod: CPTII,95,, | Performed by: INTERNAL MEDICINE

## 2023-08-11 ENCOUNTER — LAB VISIT (OUTPATIENT)
Dept: LAB | Facility: HOSPITAL | Age: 33
End: 2023-08-11
Attending: INTERNAL MEDICINE
Payer: COMMERCIAL

## 2023-08-11 DIAGNOSIS — R19.7 DIARRHEA, UNSPECIFIED TYPE: ICD-10-CM

## 2023-08-11 LAB
C DIFF GDH STL QL: NEGATIVE
C DIFF TOX A+B STL QL IA: NEGATIVE

## 2023-08-11 PROCEDURE — 87329 GIARDIA AG IA: CPT | Performed by: INTERNAL MEDICINE

## 2023-08-11 PROCEDURE — 87046 STOOL CULTR AEROBIC BACT EA: CPT | Performed by: INTERNAL MEDICINE

## 2023-08-11 PROCEDURE — 87207 SMEAR SPECIAL STAIN: CPT | Performed by: INTERNAL MEDICINE

## 2023-08-11 PROCEDURE — 87015 SPECIMEN INFECT AGNT CONCNTJ: CPT | Performed by: INTERNAL MEDICINE

## 2023-08-11 PROCEDURE — 87045 FECES CULTURE AEROBIC BACT: CPT | Performed by: INTERNAL MEDICINE

## 2023-08-11 PROCEDURE — 87449 NOS EACH ORGANISM AG IA: CPT | Performed by: INTERNAL MEDICINE

## 2023-08-11 PROCEDURE — 87449 NOS EACH ORGANISM AG IA: CPT | Mod: 91 | Performed by: INTERNAL MEDICINE

## 2023-08-11 PROCEDURE — 87427 SHIGA-LIKE TOXIN AG IA: CPT | Mod: 59 | Performed by: INTERNAL MEDICINE

## 2023-08-13 LAB
BACTERIA STL CULT: NORMAL
CRYPTOSP AG STL QL IA: NEGATIVE
G LAMBLIA AG STL QL IA: NEGATIVE

## 2023-08-14 LAB
CYCLOSPORA STL SAFRANIN STN: NORMAL
E COLI SXT1 STL QL IA: NEGATIVE
E COLI SXT2 STL QL IA: NEGATIVE

## 2023-09-14 ENCOUNTER — OFFICE VISIT (OUTPATIENT)
Dept: OBSTETRICS AND GYNECOLOGY | Facility: CLINIC | Age: 33
End: 2023-09-14
Attending: OBSTETRICS & GYNECOLOGY
Payer: COMMERCIAL

## 2023-09-14 VITALS
SYSTOLIC BLOOD PRESSURE: 110 MMHG | HEIGHT: 60 IN | WEIGHT: 153.88 LBS | DIASTOLIC BLOOD PRESSURE: 64 MMHG | BODY MASS INDEX: 30.21 KG/M2

## 2023-09-14 DIAGNOSIS — Z12.4 PAP SMEAR FOR CERVICAL CANCER SCREENING: ICD-10-CM

## 2023-09-14 DIAGNOSIS — Z01.419 WOMEN'S ANNUAL ROUTINE GYNECOLOGICAL EXAMINATION: Primary | ICD-10-CM

## 2023-09-14 PROCEDURE — 3078F PR MOST RECENT DIASTOLIC BLOOD PRESSURE < 80 MM HG: ICD-10-PCS | Mod: CPTII,S$GLB,, | Performed by: OBSTETRICS & GYNECOLOGY

## 2023-09-14 PROCEDURE — 99999 PR PBB SHADOW E&M-EST. PATIENT-LVL III: CPT | Mod: PBBFAC,,, | Performed by: OBSTETRICS & GYNECOLOGY

## 2023-09-14 PROCEDURE — 3044F HG A1C LEVEL LT 7.0%: CPT | Mod: CPTII,S$GLB,, | Performed by: OBSTETRICS & GYNECOLOGY

## 2023-09-14 PROCEDURE — 1159F MED LIST DOCD IN RCRD: CPT | Mod: CPTII,S$GLB,, | Performed by: OBSTETRICS & GYNECOLOGY

## 2023-09-14 PROCEDURE — 1160F PR REVIEW ALL MEDS BY PRESCRIBER/CLIN PHARMACIST DOCUMENTED: ICD-10-PCS | Mod: CPTII,S$GLB,, | Performed by: OBSTETRICS & GYNECOLOGY

## 2023-09-14 PROCEDURE — 3074F PR MOST RECENT SYSTOLIC BLOOD PRESSURE < 130 MM HG: ICD-10-PCS | Mod: CPTII,S$GLB,, | Performed by: OBSTETRICS & GYNECOLOGY

## 2023-09-14 PROCEDURE — 3074F SYST BP LT 130 MM HG: CPT | Mod: CPTII,S$GLB,, | Performed by: OBSTETRICS & GYNECOLOGY

## 2023-09-14 PROCEDURE — 88175 CYTOPATH C/V AUTO FLUID REDO: CPT | Performed by: OBSTETRICS & GYNECOLOGY

## 2023-09-14 PROCEDURE — 99395 PREV VISIT EST AGE 18-39: CPT | Mod: S$GLB,,, | Performed by: OBSTETRICS & GYNECOLOGY

## 2023-09-14 PROCEDURE — 3008F PR BODY MASS INDEX (BMI) DOCUMENTED: ICD-10-PCS | Mod: CPTII,S$GLB,, | Performed by: OBSTETRICS & GYNECOLOGY

## 2023-09-14 PROCEDURE — 99999 PR PBB SHADOW E&M-EST. PATIENT-LVL III: ICD-10-PCS | Mod: PBBFAC,,, | Performed by: OBSTETRICS & GYNECOLOGY

## 2023-09-14 PROCEDURE — 1159F PR MEDICATION LIST DOCUMENTED IN MEDICAL RECORD: ICD-10-PCS | Mod: CPTII,S$GLB,, | Performed by: OBSTETRICS & GYNECOLOGY

## 2023-09-14 PROCEDURE — 3008F BODY MASS INDEX DOCD: CPT | Mod: CPTII,S$GLB,, | Performed by: OBSTETRICS & GYNECOLOGY

## 2023-09-14 PROCEDURE — 3078F DIAST BP <80 MM HG: CPT | Mod: CPTII,S$GLB,, | Performed by: OBSTETRICS & GYNECOLOGY

## 2023-09-14 PROCEDURE — 3044F PR MOST RECENT HEMOGLOBIN A1C LEVEL <7.0%: ICD-10-PCS | Mod: CPTII,S$GLB,, | Performed by: OBSTETRICS & GYNECOLOGY

## 2023-09-14 PROCEDURE — 99395 PR PREVENTIVE VISIT,EST,18-39: ICD-10-PCS | Mod: S$GLB,,, | Performed by: OBSTETRICS & GYNECOLOGY

## 2023-09-14 PROCEDURE — 87624 HPV HI-RISK TYP POOLED RSLT: CPT | Performed by: OBSTETRICS & GYNECOLOGY

## 2023-09-14 PROCEDURE — 1160F RVW MEDS BY RX/DR IN RCRD: CPT | Mod: CPTII,S$GLB,, | Performed by: OBSTETRICS & GYNECOLOGY

## 2023-09-14 NOTE — PROGRESS NOTES
Tamara Aldana is a 33 y.o. female  who presents for annual exam.  Menses occur monthly lasting 3-4 days in duration, without intermenstrual bleeding.  Using calendar method for contraception.  Denies history of abnormal paps.  Ultrasound  revealed an arcuate versus partial septate uterus.  No gyn complaints.  Patient's last menstrual period was 2023.    Blood pressure 110/64, height 5' (1.524 m), weight 69.8 kg (153 lb 14.1 oz), last menstrual period 2023, not currently breastfeeding.    19 Pap: Negative    Past Medical History:   Diagnosis Date    ADD (attention deficit disorder)     Anxiety     History of panic attacks        Past Surgical History:   Procedure Laterality Date     SECTION N/A 2021    Procedure:  SECTION;  Surgeon: Julie R. Jeansonne, MD;  Location: [facility], [place], operated by [facility];  Service: OB/GYN;  Laterality: N/A;    LASIK Bilateral     TONSILLECTOMY      WISDOM TOOTH EXTRACTION         OB History          1    Para   1    Term   1       0    AB   0    Living   1         SAB   0    IAB   0    Ectopic   0    Multiple   0    Live Births   1           Obstetric Comments   Menarche ~ 15               ROS:  GENERAL: Feeling well overall.   SKIN: Denies rash or lesions.   HEAD: Denies head injury or headache.   NODES: Denies enlarged lymph nodes.   CHEST: Denies chest pain or shortness of breath.   CARDIOVASCULAR: Denies palpitations or left sided chest pain.   ABDOMEN: No abdominal pain, nausea, vomiting or rectal bleeding.   URINARY: No dysuria or hematuria.  REPRODUCTIVE: See HPI.   BREASTS: Denies pain, lumps, or nipple discharge.   HEMATOLOGIC: No easy bruisability or excessive bleeding.   MUSCULOSKELETAL: Denies joint pain or swelling.   NEUROLOGIC: Denies syncope or weakness.   PSYCHIATRIC: Denies depression.    PE:   (chaperone present during entire exam)  APPEARANCE: Well nourished, well developed, in no acute distress.  BREASTS:  Symmetrical, no skin changes or visible lesions. No palpable masses, nipple discharge or adenopathy bilaterally.  ABDOMEN: Soft. No tenderness or masses. No hernias. No CVA tenderness.  VULVA: No lesions. Normal female genitalia.  URETHRAL MEATUS: Normal size and location, no lesions, no prolapse.  URETHRA: No masses, tenderness, prolapse or scarring.  VAGINA: Moist and well rugated, no abnormal discharge, no significant cystocele or rectocele.  CERVIX: No lesions and discharge. PAP done.  UTERUS: Normal size, regular shape, mobile, non-tender, bladder base nontender.  ADNEXA: No masses, tenderness or CDS nodularity.  ANUS PERINEUM: Normal.      Diagnosis:  1. Women's annual routine gynecological examination    2. Pap smear for cervical cancer screening          PLAN:    Orders Placed This Encounter    HPV High Risk Genotypes, PCR    Liquid-Based Pap Smear, Screening       Patient was counseled today on the need for annual gyn exams.    Follow-up in 1 year.    This note was created with voice recognition software.  Grammatical, syntax and spelling errors may be inevitable.

## 2023-09-19 ENCOUNTER — PATIENT MESSAGE (OUTPATIENT)
Dept: OBSTETRICS AND GYNECOLOGY | Facility: CLINIC | Age: 33
End: 2023-09-19
Payer: COMMERCIAL

## 2023-09-19 LAB
FINAL PATHOLOGIC DIAGNOSIS: NORMAL
HPV HR 12 DNA SPEC QL NAA+PROBE: NEGATIVE
HPV16 AG SPEC QL: NEGATIVE
HPV18 DNA SPEC QL NAA+PROBE: NEGATIVE
Lab: NORMAL

## 2023-09-30 ENCOUNTER — OFFICE VISIT (OUTPATIENT)
Dept: URGENT CARE | Facility: CLINIC | Age: 33
End: 2023-09-30
Payer: COMMERCIAL

## 2023-09-30 VITALS
WEIGHT: 153 LBS | RESPIRATION RATE: 20 BRPM | HEIGHT: 60 IN | SYSTOLIC BLOOD PRESSURE: 106 MMHG | DIASTOLIC BLOOD PRESSURE: 77 MMHG | HEART RATE: 86 BPM | BODY MASS INDEX: 30.04 KG/M2 | OXYGEN SATURATION: 97 % | TEMPERATURE: 99 F

## 2023-09-30 DIAGNOSIS — B96.89 ACUTE BACTERIAL RHINOSINUSITIS: ICD-10-CM

## 2023-09-30 DIAGNOSIS — J01.90 ACUTE BACTERIAL RHINOSINUSITIS: ICD-10-CM

## 2023-09-30 DIAGNOSIS — R05.9 COUGH, UNSPECIFIED TYPE: Primary | ICD-10-CM

## 2023-09-30 LAB
CTP QC/QA: YES
SARS-COV-2 AG RESP QL IA.RAPID: NEGATIVE

## 2023-09-30 PROCEDURE — 99213 PR OFFICE/OUTPT VISIT, EST, LEVL III, 20-29 MIN: ICD-10-PCS | Mod: S$GLB,,,

## 2023-09-30 PROCEDURE — 99213 OFFICE O/P EST LOW 20 MIN: CPT | Mod: S$GLB,,,

## 2023-09-30 PROCEDURE — 87811 SARS CORONAVIRUS 2 ANTIGEN POCT, MANUAL READ: ICD-10-PCS | Mod: QW,S$GLB,,

## 2023-09-30 PROCEDURE — 87811 SARS-COV-2 COVID19 W/OPTIC: CPT | Mod: QW,S$GLB,,

## 2023-09-30 RX ORDER — BENZONATATE 200 MG/1
200 CAPSULE ORAL 3 TIMES DAILY PRN
Qty: 30 CAPSULE | Refills: 0 | Status: SHIPPED | OUTPATIENT
Start: 2023-09-30 | End: 2024-02-27

## 2023-09-30 RX ORDER — AMOXICILLIN AND CLAVULANATE POTASSIUM 875; 125 MG/1; MG/1
1 TABLET, FILM COATED ORAL EVERY 12 HOURS
Qty: 14 TABLET | Refills: 0 | Status: SHIPPED | OUTPATIENT
Start: 2023-09-30 | End: 2023-10-07

## 2023-09-30 NOTE — PROGRESS NOTES
Subjective:      Patient ID: Tamara Aldana is a 33 y.o. female.    Vitals:  height is 5' (1.524 m) and weight is 69.4 kg (153 lb). Her temperature is 98.7 °F (37.1 °C). Her blood pressure is 106/77 and her pulse is 86. Her respiration is 20 and oxygen saturation is 97%.     Chief Complaint: Cough    33-year-old female patient reports of ongoing yellow green productive cough, postnasal drip, sinus pressure ongoing for 7 days.  Patient states she has been taking ibuprofen, Tessalon Perles, Zyrtec, Flonase for her symptoms with mild relief.  Patient denies any fever, chest pain, shortness a breath, GI complaints, or any other associated symptoms.  Patient states her son at home is positive for RSV.        Cough  This is a new problem. The current episode started 1 to 4 weeks ago. The problem has been gradually worsening. The problem occurs every few minutes. The cough is Productive of sputum. Associated symptoms include postnasal drip. Pertinent negatives include no chest pain, chills, ear congestion, ear pain, fever, headaches, heartburn, hemoptysis, myalgias, nasal congestion, rash, rhinorrhea, sore throat, shortness of breath, sweats, weight loss or wheezing. Nothing aggravates the symptoms. Treatments tried: ibuprofen. The treatment provided mild relief. There is no history of environmental allergies.       Constitution: Negative for activity change, appetite change, chills, sweating, fatigue and fever.   HENT:  Positive for postnasal drip and sinus pressure. Negative for ear pain, ear discharge, foreign body in ear, tinnitus, hearing loss, facial trauma, congestion, nosebleeds, foreign body in nose, sinus pain and sore throat.    Neck: Negative for neck pain, neck stiffness, painful lymph nodes and neck swelling.   Cardiovascular:  Negative for chest trauma, chest pain, leg swelling and palpitations.   Eyes:  Negative for eye trauma, foreign body in eye, eye discharge, eye itching and eye pain.   Respiratory:   Positive for cough and sputum production. Negative for sleep apnea, chest tightness, bloody sputum, COPD, shortness of breath, stridor, wheezing and asthma.    Gastrointestinal:  Negative for abdominal trauma, abdominal pain, abdominal bloating, history of abdominal surgery, nausea, vomiting and heartburn.   Endocrine: hair loss, cold intolerance and heat intolerance.   Genitourinary:  Negative for dysuria, frequency, urgency and urine decreased.   Musculoskeletal:  Negative for pain, trauma, joint pain, joint swelling, abnormal ROM of joint and muscle ache.   Skin:  Negative for color change, pale, rash, wound and abrasion.   Allergic/Immunologic: Positive for recurrent sinus infections. Negative for environmental allergies, seasonal allergies, food allergies, eczema, asthma and immunocompromised state.   Neurological:  Negative for dizziness, history of vertigo, light-headedness, passing out, headaches, history of migraines, disorientation and altered mental status.   Hematologic/Lymphatic: Negative for swollen lymph nodes, easy bruising/bleeding and trouble clotting. Does not bruise/bleed easily.   Psychiatric/Behavioral:  Negative for altered mental status, disorientation, confusion, agitation and nervous/anxious. The patient is not nervous/anxious.       Objective:     Physical Exam   Constitutional: She is oriented to person, place, and time. She appears well-developed. She is cooperative.  Non-toxic appearance. She does not appear ill. No distress.   HENT:   Head: Normocephalic and atraumatic.   Ears:   Right Ear: Hearing, tympanic membrane, external ear and ear canal normal.   Left Ear: Hearing, tympanic membrane, external ear and ear canal normal.   Nose: Rhinorrhea present. No mucosal edema or nasal deformity. No epistaxis. Right sinus exhibits no maxillary sinus tenderness and no frontal sinus tenderness. Left sinus exhibits no maxillary sinus tenderness and no frontal sinus tenderness.   Mouth/Throat:  Uvula is midline, oropharynx is clear and moist and mucous membranes are normal. No trismus in the jaw. Normal dentition. No uvula swelling. No oropharyngeal exudate, posterior oropharyngeal edema or posterior oropharyngeal erythema.   Eyes: Conjunctivae and lids are normal. No scleral icterus.   Neck: Trachea normal and phonation normal. Neck supple. No edema present. No erythema present. No neck rigidity present.   Cardiovascular: Normal rate, regular rhythm, normal heart sounds and normal pulses.   Pulmonary/Chest: Effort normal and breath sounds normal. No stridor. No respiratory distress. She has no decreased breath sounds. She has no wheezes. She has no rhonchi. She has no rales. She exhibits no tenderness.   Abdominal: Normal appearance.   Musculoskeletal: Normal range of motion.         General: No deformity. Normal range of motion.   Neurological: She is alert and oriented to person, place, and time. She exhibits normal muscle tone. Coordination normal.   Skin: Skin is warm, dry, intact, not diaphoretic and not pale.   Psychiatric: Her speech is normal and behavior is normal. Judgment and thought content normal.   Nursing note and vitals reviewed.    Results for orders placed or performed in visit on 09/30/23   SARS Coronavirus 2 Antigen, POCT Manual Read   Result Value Ref Range    SARS Coronavirus 2 Antigen Negative Negative     Acceptable Yes       Assessment:     1. Cough, unspecified type    2. Acute bacterial rhinosinusitis        Plan:       Cough, unspecified type  -     SARS Coronavirus 2 Antigen, POCT Manual Read  -     benzonatate (TESSALON) 200 MG capsule; Take 1 capsule (200 mg total) by mouth 3 (three) times daily as needed for Cough.  Dispense: 30 capsule; Refill: 0    Acute bacterial rhinosinusitis  -     amoxicillin-clavulanate 875-125mg (AUGMENTIN) 875-125 mg per tablet; Take 1 tablet by mouth every 12 (twelve) hours. for 7 days  Dispense: 14 tablet; Refill: 0              Additional MDM:     Heart Failure Score:   COPD = No

## 2023-09-30 NOTE — PATIENT INSTRUCTIONS
Take Mucinex over-the-counter as needed for cough.  You may also take Tessalon Perles 3 times a day as needed.  Take Augmentin twice a day for 7 days.  Continue Flonase and Zyrtec daily for your sinus.    What care is needed at home?   Call your regular doctor to let them know you were in the ED. Make a follow-up appointment if you were told to.  To help you feel better:  Use a cool mist humidifier to avoid breathing dry air.  Use hard candy or cough drops to soothe sore throat and cough.  Gargle with salt water (mix 1/2 teaspoon salt with 1 cup warm water) a few times a day.  Spray saltwater mist in each nostril. Any normal saline spray works.  Sip warm liquids to keep your throat moist.  Take warm, steamy showers to help soothe the cough.  Do not smoke or be in smoke-filled places. Avoid things that may cause breathing problems like vaping, fumes, pollution, dust, and other common allergens.  You may want to use over-the-counter medicines for allergies or acid reflux if your cough is due to one of these problems.  You can also use an over-the-counter cough medicine.  When do I need to get emergency help?   Return to the ED if:   You have chest pain when you cough or trouble breathing.  You start to cough up blood or yellow or green mucus.  When do I need to call the doctor?   You have a fever of 100.4°F (38°C) or higher or chills.  You cough so hard you throw up.  You are still coughing in 10 days.  You have new or worsening symptoms.  - Rest.    - Drink plenty of fluids.    - Acetaminophen (tylenol) or Ibuprofen (advil,motrin) as directed as needed for fever/pain. Avoid tylenol if you have a history of liver disease. Do not take ibuprofen if you have a history of GI bleeding, kidney disease, or if you take blood thinners.     - Follow up with your PCP or specialty clinic as directed in the next 1-2 weeks if not improved or as needed.  You can call [phone] to schedule an appointment with the appropriate  provider.    - Go to the ER or seek medical attention immediately if you develop new or worsening symptoms.     - You must understand that you have received an Urgent Care treatment only and that you may be released before all of your medical problems are known or treated.   - You, the patient, will arrange for follow up care as instructed.   - If your condition worsens or fails to improve we recommend that you receive another evaluation at the ER immediately or contact your PCP to discuss your concerns or return here.

## 2023-10-17 ENCOUNTER — OFFICE VISIT (OUTPATIENT)
Dept: GASTROENTEROLOGY | Facility: CLINIC | Age: 33
End: 2023-10-17
Payer: COMMERCIAL

## 2023-10-17 VITALS — HEIGHT: 60 IN | BODY MASS INDEX: 30.17 KG/M2 | WEIGHT: 153.69 LBS

## 2023-10-17 DIAGNOSIS — Z80.0 FAMILY HISTORY OF COLON CANCER: Primary | ICD-10-CM

## 2023-10-17 DIAGNOSIS — R10.9 ABDOMINAL PAIN, UNSPECIFIED ABDOMINAL LOCATION: ICD-10-CM

## 2023-10-17 DIAGNOSIS — R19.7 DIARRHEA, UNSPECIFIED TYPE: ICD-10-CM

## 2023-10-17 PROCEDURE — 3044F PR MOST RECENT HEMOGLOBIN A1C LEVEL <7.0%: ICD-10-PCS | Mod: CPTII,S$GLB,, | Performed by: INTERNAL MEDICINE

## 2023-10-17 PROCEDURE — 3008F PR BODY MASS INDEX (BMI) DOCUMENTED: ICD-10-PCS | Mod: CPTII,S$GLB,, | Performed by: INTERNAL MEDICINE

## 2023-10-17 PROCEDURE — 3008F BODY MASS INDEX DOCD: CPT | Mod: CPTII,S$GLB,, | Performed by: INTERNAL MEDICINE

## 2023-10-17 PROCEDURE — 99204 PR OFFICE/OUTPT VISIT, NEW, LEVL IV, 45-59 MIN: ICD-10-PCS | Mod: S$GLB,,, | Performed by: INTERNAL MEDICINE

## 2023-10-17 PROCEDURE — 99999 PR PBB SHADOW E&M-EST. PATIENT-LVL IV: ICD-10-PCS | Mod: PBBFAC,,, | Performed by: INTERNAL MEDICINE

## 2023-10-17 PROCEDURE — 1159F PR MEDICATION LIST DOCUMENTED IN MEDICAL RECORD: ICD-10-PCS | Mod: CPTII,S$GLB,, | Performed by: INTERNAL MEDICINE

## 2023-10-17 PROCEDURE — 3044F HG A1C LEVEL LT 7.0%: CPT | Mod: CPTII,S$GLB,, | Performed by: INTERNAL MEDICINE

## 2023-10-17 PROCEDURE — 99204 OFFICE O/P NEW MOD 45 MIN: CPT | Mod: S$GLB,,, | Performed by: INTERNAL MEDICINE

## 2023-10-17 PROCEDURE — 99999 PR PBB SHADOW E&M-EST. PATIENT-LVL IV: CPT | Mod: PBBFAC,,, | Performed by: INTERNAL MEDICINE

## 2023-10-17 PROCEDURE — 1159F MED LIST DOCD IN RCRD: CPT | Mod: CPTII,S$GLB,, | Performed by: INTERNAL MEDICINE

## 2023-10-17 RX ORDER — HYOSCYAMINE SULFATE 0.125 MG
125 TABLET ORAL EVERY 6 HOURS PRN
Qty: 90 TABLET | Refills: 6 | Status: SHIPPED | OUTPATIENT
Start: 2023-10-17

## 2023-10-17 RX ORDER — SODIUM, POTASSIUM,MAG SULFATES 17.5-3.13G
1 SOLUTION, RECONSTITUTED, ORAL ORAL DAILY
Qty: 1 KIT | Refills: 0 | Status: SHIPPED | OUTPATIENT
Start: 2023-10-17 | End: 2023-10-22

## 2023-10-17 NOTE — PROGRESS NOTES
"Subjective:       Patient ID: Tamara Aldana is a 33 y.o. female.    Chief Complaint: Abdominal Cramping and Diarrhea    Patient here today to establish care with aforementioned complaints.    Diarrhea every morning. 3 months. Saw PCP: stool tests normal. 2-3 bm, with cramping. Improved with bm. Sometimes after eating but also not associated with eating. Denies blood in stool. Does report some "irritation" when diarrhea was especially bad    Culturell probiotics are helping. Decreased in frequency, more formed now. Ongoing stomach pains after eating.     Denies other ugi symptoms.     Grandfather with colon cancer in 50-60s. Father with polyps in addition to diverticulosis/frequent flairs of diverticulitis.       Past Medical History:   Diagnosis Date    ADD (attention deficit disorder)     Anxiety     History of panic attacks        Past Surgical History:   Procedure Laterality Date     SECTION N/A 2021    Procedure:  SECTION;  Surgeon: Julie R. Jeansonne, MD;  Location: [facility];  Service: OB/GYN;  Laterality: N/A;    LASIK Bilateral     TONSILLECTOMY  2014    WISDOM TOOTH EXTRACTION         Social History  Social History     Tobacco Use    Smoking status: Never     Passive exposure: Never    Smokeless tobacco: Never   Substance Use Topics    Alcohol use: Not Currently    Drug use: No       Family History   Problem Relation Age of Onset    Hyperlipidemia Mother     Schizophrenia Mother     Colon polyps Father     Colon cancer Paternal Grandfather     Cancer Paternal Grandfather     Breast cancer Neg Hx     Ovarian cancer Neg Hx     Melanoma Neg Hx        Review of Systems   Gastrointestinal:  Positive for abdominal pain and diarrhea. Negative for anal bleeding and blood in stool.           Objective:      Physical Exam  Constitutional:       Appearance: She is well-developed.   HENT:      Head: Normocephalic and atraumatic.   Eyes:      Conjunctiva/sclera: Conjunctivae normal. "   Pulmonary:      Effort: Pulmonary effort is normal. No respiratory distress.   Musculoskeletal:      Cervical back: Normal range of motion.   Neurological:      Mental Status: She is alert and oriented to person, place, and time.   Psychiatric:         Behavior: Behavior normal.         Thought Content: Thought content normal.         Judgment: Judgment normal.           Pertinent labs and imaging studies reviewed    Assessment:       1. Family history of colon cancer    2. Abdominal pain, unspecified abdominal location    3. Diarrhea, unspecified type        Plan:       Will schedule colonoscopy given family history of colon cancer/polyps  Diarrhea is improving however will perform biopsies to exclude microscopic colitis  Continue probiotic for now   P.merari Pagan    (Portions of this note were dictated using voice recognition software and may contain dictation related errors in spelling/grammar/syntax not found on text review)

## 2023-10-17 NOTE — PATIENT INSTRUCTIONS
SUPREP Instructions    [facility] [address]  Clinic Office [phone]  Endoscopy Lab [phone]    You are scheduled for a Colonoscopy with Dr. Ledezma  on 12/04/2023 at [facility] in [place].    Check in at the Hospital -1st floor, Information desk.   Call [phone] to reschedule.    An adult friend/family member must come with you to drive you home.  You cannot drive, take a taxi, Uber/Lyft or bus to leave the Endoscopy Center alone.  If you do not have someone to drive you home, your test will be cancelled.     Please follow the directions of your doctor if you take any pills that thin your blood. If you take these meds: Aggrenox, Brilinta, Effient, Eliquis, Lovenox, Plavix, Pletal, Pradaxa, Ticilid, Xarelto or Coumadin, let the doctor's office know.    Please hold any GLP-1 medications prior to the procedure: Dulaglutide Trulicity(hold week prior), Exenatide Byetta (hold the morning of procedure), Semaglutide Ozempic (hold week prior), Liraglutide Victoza, Saxenda(hold week prior), Lixisenatide Adlyxin (hold the morning of procedure), Semaglutide Rybelsus (hold the morning of procedure), Tirzepatide Mounjaro (hold week prior)     DON'T: On the morning of the test do not take insulin or pills for diabetes.     DO: On the morning of the test, do take any pills for blood pressure, heart, anti-rejection and or seizures with a small sip of water. Bring any inhalers with you.    To have a good prep, you must follow these instructions - please do not use the directions from the pharmacy.    The doctor will send a prescription for the SUPREP.      The Day Before the test:    You can only drink CLEAR LIQUIDS the whole day before your test.  You can't eat any food for the whole day.    You CAN have:  Water, Coffee or decaf coffee (no milk or cream)  Tea  Soft drinks - regular and sugar free  Jello (green or yellow)  Apple Juice, white grape juice, white cranberry  juice  Gatorade, Power Aid, Crystal Light, Anthony Aid  Lemonade and Limeade  Bouillon, clear soup  Snowball, popsicles  YOU CAN'T DRINK ANYTHING RED, PURPLE ORANGE OR BLUE   YOU CAN'T DRINK ALCOHOL  ONLY DRINK WHAT IS ON THE LIST      At 5 pm the night before your test:    Pour the 1st bottle of SUPREP into the cup provided in the box. Add water to the line on the cup and mix well.  Drink the whole cup and then drink 2 more full cups of water over 1 hour.  This can be easier to drink if it is cold. You can mix it 20 minutes ahead of time and place in the refrigerator before you drink it.  You must drink it within 30-45 minutes of mixing it.  Do NOT pour the drink over ice.  You can drink it with a straw.    The Day of the test - We will call you 2 days before your test to tell you what time to get there.    5 hours before you come to the hospital (this may be in the middle of the night)  Pour the 2nd bottle of SUPREP into the cup provided in the box. Add water to the line on the cup and mix well.  Drink the whole cup and then drink 2 more full cups of water over 1 hour.  It might be easier to drink if it is cold. You can mix it 20 minutes ahead of time and place in the refrigerator before you drink it.  You must drink it within 30-45 minutes of mixing it.  Do NOT pour the drink over ice.  You can drink it with a straw.    YOU CAN'T EAT OR DRINK ANYTHING ELSE ONCE YOU FINISH THE PREP    Leave all valuables and jewelry at home. You will be at the hospital for 2-4 hours.    Call the Endoscopy department at [phone] with any questions about your procedure.

## 2023-11-15 ENCOUNTER — HOSPITAL ENCOUNTER (EMERGENCY)
Facility: HOSPITAL | Age: 33
Discharge: HOME | End: 2023-11-15
Attending: EMERGENCY MEDICINE
Payer: COMMERCIAL

## 2023-11-15 ENCOUNTER — PATIENT MESSAGE (OUTPATIENT)
Dept: ENDOSCOPY | Facility: HOSPITAL | Age: 33
End: 2023-11-15
Payer: COMMERCIAL

## 2023-11-15 VITALS
HEART RATE: 100 BPM | OXYGEN SATURATION: 98 % | TEMPERATURE: 98 F | DIASTOLIC BLOOD PRESSURE: 81 MMHG | BODY MASS INDEX: 29.88 KG/M2 | RESPIRATION RATE: 17 BRPM | SYSTOLIC BLOOD PRESSURE: 125 MMHG | WEIGHT: 153 LBS

## 2023-11-15 DIAGNOSIS — R10.13 EPIGASTRIC ABDOMINAL PAIN: Primary | ICD-10-CM

## 2023-11-15 LAB
ALBUMIN SERPL BCP-MCNC: 4.2 G/DL (ref 3.5–5.2)
ALP SERPL-CCNC: 66 U/L (ref 55–135)
ALT SERPL W/O P-5'-P-CCNC: 16 U/L (ref 10–44)
ANION GAP SERPL CALC-SCNC: 10 MMOL/L (ref 8–16)
AST SERPL-CCNC: 15 U/L (ref 10–40)
B-HCG UR QL: NEGATIVE
BACTERIA #/AREA URNS AUTO: ABNORMAL /HPF
BASOPHILS # BLD AUTO: 0.08 K/UL (ref 0–0.2)
BASOPHILS NFR BLD: 0.8 % (ref 0–1.9)
BILIRUB SERPL-MCNC: 0.4 MG/DL (ref 0.1–1)
BILIRUB UR QL STRIP: NEGATIVE
BUN SERPL-MCNC: 7 MG/DL (ref 6–20)
CALCIUM SERPL-MCNC: 9.2 MG/DL (ref 8.7–10.5)
CHLORIDE SERPL-SCNC: 104 MMOL/L (ref 95–110)
CLARITY UR REFRACT.AUTO: CLEAR
CO2 SERPL-SCNC: 22 MMOL/L (ref 23–29)
COLOR UR AUTO: ABNORMAL
CREAT SERPL-MCNC: 0.8 MG/DL (ref 0.5–1.4)
CTP QC/QA: YES
DIFFERENTIAL METHOD: ABNORMAL
EOSINOPHIL # BLD AUTO: 0.4 K/UL (ref 0–0.5)
EOSINOPHIL NFR BLD: 3.9 % (ref 0–8)
ERYTHROCYTE [DISTWIDTH] IN BLOOD BY AUTOMATED COUNT: 12.9 % (ref 11.5–14.5)
EST. GFR  (NO RACE VARIABLE): >60 ML/MIN/1.73 M^2
GLUCOSE SERPL-MCNC: 84 MG/DL (ref 70–110)
GLUCOSE UR QL STRIP: NEGATIVE
HCT VFR BLD AUTO: 40.3 % (ref 37–48.5)
HGB BLD-MCNC: 14 G/DL (ref 12–16)
HGB UR QL STRIP: ABNORMAL
IMM GRANULOCYTES # BLD AUTO: 0.02 K/UL (ref 0–0.04)
IMM GRANULOCYTES NFR BLD AUTO: 0.2 % (ref 0–0.5)
KETONES UR QL STRIP: ABNORMAL
LEUKOCYTE ESTERASE UR QL STRIP: NEGATIVE
LIPASE SERPL-CCNC: 19 U/L (ref 4–60)
LYMPHOCYTES # BLD AUTO: 2.9 K/UL (ref 1–4.8)
LYMPHOCYTES NFR BLD: 28.2 % (ref 18–48)
MCH RBC QN AUTO: 31.3 PG (ref 27–31)
MCHC RBC AUTO-ENTMCNC: 34.7 G/DL (ref 32–36)
MCV RBC AUTO: 90 FL (ref 82–98)
MICROSCOPIC COMMENT: ABNORMAL
MONOCYTES # BLD AUTO: 0.8 K/UL (ref 0.3–1)
MONOCYTES NFR BLD: 7.5 % (ref 4–15)
NEUTROPHILS # BLD AUTO: 6.1 K/UL (ref 1.8–7.7)
NEUTROPHILS NFR BLD: 59.4 % (ref 38–73)
NITRITE UR QL STRIP: NEGATIVE
NRBC BLD-RTO: 0 /100 WBC
PH UR STRIP: 5 [PH] (ref 5–8)
PLATELET # BLD AUTO: 324 K/UL (ref 150–450)
PMV BLD AUTO: 10.8 FL (ref 9.2–12.9)
POTASSIUM SERPL-SCNC: 3.5 MMOL/L (ref 3.5–5.1)
PROT SERPL-MCNC: 7 G/DL (ref 6–8.4)
PROT UR QL STRIP: NEGATIVE
RBC # BLD AUTO: 4.47 M/UL (ref 4–5.4)
RBC #/AREA URNS AUTO: 7 /HPF (ref 0–4)
SODIUM SERPL-SCNC: 136 MMOL/L (ref 136–145)
SP GR UR STRIP: 1.01 (ref 1–1.03)
SQUAMOUS #/AREA URNS AUTO: 0 /HPF
URN SPEC COLLECT METH UR: ABNORMAL
WBC # BLD AUTO: 10.24 K/UL (ref 3.9–12.7)
WBC #/AREA URNS AUTO: 1 /HPF (ref 0–5)

## 2023-11-15 PROCEDURE — 25000003 PHARM REV CODE 250: Performed by: PHYSICIAN ASSISTANT

## 2023-11-15 PROCEDURE — 80053 COMPREHEN METABOLIC PANEL: CPT | Performed by: EMERGENCY MEDICINE

## 2023-11-15 PROCEDURE — 25000003 PHARM REV CODE 250: Performed by: EMERGENCY MEDICINE

## 2023-11-15 PROCEDURE — 99284 EMERGENCY DEPT VISIT MOD MDM: CPT | Mod: 25

## 2023-11-15 PROCEDURE — 81001 URINALYSIS AUTO W/SCOPE: CPT | Performed by: EMERGENCY MEDICINE

## 2023-11-15 PROCEDURE — 85025 COMPLETE CBC W/AUTO DIFF WBC: CPT | Performed by: EMERGENCY MEDICINE

## 2023-11-15 PROCEDURE — 81025 URINE PREGNANCY TEST: CPT | Performed by: EMERGENCY MEDICINE

## 2023-11-15 PROCEDURE — 96374 THER/PROPH/DIAG INJ IV PUSH: CPT

## 2023-11-15 PROCEDURE — 83690 ASSAY OF LIPASE: CPT | Performed by: EMERGENCY MEDICINE

## 2023-11-15 RX ORDER — FAMOTIDINE 20 MG/1
20 TABLET, FILM COATED ORAL 2 TIMES DAILY
Qty: 28 TABLET | Refills: 0 | Status: SHIPPED | OUTPATIENT
Start: 2023-11-15 | End: 2023-11-29

## 2023-11-15 RX ORDER — PANTOPRAZOLE SODIUM 40 MG/1
40 TABLET, DELAYED RELEASE ORAL
Status: COMPLETED | OUTPATIENT
Start: 2023-11-15 | End: 2023-11-15

## 2023-11-15 RX ORDER — FAMOTIDINE 10 MG/ML
20 INJECTION INTRAVENOUS
Status: COMPLETED | OUTPATIENT
Start: 2023-11-15 | End: 2023-11-15

## 2023-11-15 RX ORDER — PANTOPRAZOLE SODIUM 20 MG/1
20 TABLET, DELAYED RELEASE ORAL DAILY
Qty: 30 TABLET | Refills: 0 | Status: SHIPPED | OUTPATIENT
Start: 2023-11-15 | End: 2023-12-16

## 2023-11-15 RX ORDER — SUCRALFATE 1 G/10ML
1 SUSPENSION ORAL EVERY 6 HOURS
Status: DISCONTINUED | OUTPATIENT
Start: 2023-11-15 | End: 2023-11-15

## 2023-11-15 RX ORDER — MAG HYDROX/ALUMINUM HYD/SIMETH 200-200-20
30 SUSPENSION, ORAL (FINAL DOSE FORM) ORAL
Status: COMPLETED | OUTPATIENT
Start: 2023-11-15 | End: 2023-11-15

## 2023-11-15 RX ORDER — MAG HYDROX/ALUMINUM HYD/SIMETH 200-200-20
30 SUSPENSION, ORAL (FINAL DOSE FORM) ORAL
Status: DISCONTINUED | OUTPATIENT
Start: 2023-11-16 | End: 2023-11-16 | Stop reason: HOSPADM

## 2023-11-15 RX ADMIN — FAMOTIDINE 20 MG: 10 INJECTION, SOLUTION INTRAVENOUS at 09:11

## 2023-11-15 RX ADMIN — ALUMINUM HYDROXIDE, MAGNESIUM HYDROXIDE, AND SIMETHICONE 30 ML: 200; 200; 20 SUSPENSION ORAL at 09:11

## 2023-11-15 RX ADMIN — PANTOPRAZOLE SODIUM 40 MG: 40 TABLET, DELAYED RELEASE ORAL at 10:11

## 2023-11-16 ENCOUNTER — PATIENT MESSAGE (OUTPATIENT)
Dept: PRIMARY CARE CLINIC | Facility: CLINIC | Age: 33
End: 2023-11-16
Payer: COMMERCIAL

## 2023-11-16 ENCOUNTER — TELEPHONE (OUTPATIENT)
Dept: GASTROENTEROLOGY | Facility: CLINIC | Age: 33
End: 2023-11-16
Payer: COMMERCIAL

## 2023-11-16 DIAGNOSIS — R31.9 HEMATURIA, UNSPECIFIED TYPE: Primary | ICD-10-CM

## 2023-11-16 NOTE — ED NOTES
"Patient states liliana upper abd " burning" x 2 days, reports recent issues with diarrhea, scheduled for colonoscopy 12/4  "

## 2023-11-16 NOTE — DISCHARGE INSTRUCTIONS
I recommend follow up with your gastroenterologist to see if you need to be scheduled for an upper scope as well.   Your blood work today is unremarkable. I have started you on a proton pump inhibitor called protonix.   You can take pepcid or zantac in addition to this as needed.

## 2023-11-16 NOTE — ED PROVIDER NOTES
"Encounter Date: 11/15/2023       History     Chief Complaint   Patient presents with    Abdominal Pain     Abdominal pain x 2 days, worsening last night. Described as "constant, dull burn" epigastric. + Diarrhea. Denies n/v.  States pepto as relived to some degree, but does not completely alleviate. Pain 6/10.      Pt is a 34 yo F who presents w upper abdominal pain for the past 3 days. She has been having issues with diarrhea and is scheduled for a colonoscopy in about a month but has not had upper abdominal pain previously. At night she is taking pepto bismol which does help. She notes she is having a lot of bloating and gas and has been taking simethicone for this.  She was prescribed Hycosamine by her GI doctor but hasn't taken it bc she hasn't really been having any cramping.     The history is provided by the patient.     Review of patient's allergies indicates:   Allergen Reactions    Bactrim [sulfamethoxazole-trimethoprim] Itching     Past Medical History:   Diagnosis Date    ADD (attention deficit disorder)     Anxiety     History of panic attacks      Past Surgical History:   Procedure Laterality Date     SECTION N/A 2021    Procedure:  SECTION;  Surgeon: Julie R. Jeansonne, MD;  Location: [facility];  Service: OB/GYN;  Laterality: N/A;    LASIK Bilateral     TONSILLECTOMY  2014    WISDOM TOOTH EXTRACTION  2011     Family History   Problem Relation Age of Onset    Hyperlipidemia Mother     Schizophrenia Mother     Colon polyps Father     Colon cancer Paternal Grandfather     Cancer Paternal Grandfather     Breast cancer Neg Hx     Ovarian cancer Neg Hx     Melanoma Neg Hx      Social History     Tobacco Use    Smoking status: Never     Passive exposure: Never    Smokeless tobacco: Never   Substance Use Topics    Alcohol use: Not Currently    Drug use: No         Physical Exam     Initial Vitals [11/15/23 1843]   BP Pulse Resp Temp SpO2   (!) 139/92 97 18 99.5 °F (37.5 °C) 98 %    "   MAP       --         Physical Exam    Nursing note and vitals reviewed.  Constitutional: She appears well-developed and well-nourished. She is not diaphoretic. No distress.   HENT:   Head: Normocephalic and atraumatic.   Eyes: Conjunctivae and EOM are normal.   Neck: Neck supple.   Normal range of motion.  Cardiovascular:  Normal rate and regular rhythm.           Pulmonary/Chest: No respiratory distress.   Abdominal: Abdomen is soft. She exhibits no distension. There is no abdominal tenderness.   Musculoskeletal:      Cervical back: Normal range of motion and neck supple.     Neurological: She is alert and oriented to person, place, and time. GCS score is 15. GCS eye subscore is 4. GCS verbal subscore is 5. GCS motor subscore is 6.   Skin: Skin is warm and dry.   Psychiatric: She has a normal mood and affect. Her behavior is normal. Judgment and thought content normal.         ED Course   Procedures  Labs Reviewed   CBC W/ AUTO DIFFERENTIAL - Abnormal; Notable for the following components:       Result Value    MCH 31.3 (*)     All other components within normal limits   URINALYSIS, REFLEX TO URINE CULTURE - Abnormal; Notable for the following components:    Ketones, UA 1+ (*)     Occult Blood UA 2+ (*)     All other components within normal limits    Narrative:     Specimen Source->Urine   URINALYSIS MICROSCOPIC - Abnormal; Notable for the following components:    RBC, UA 7 (*)     All other components within normal limits    Narrative:     Specimen Source->Urine   COMPREHENSIVE METABOLIC PANEL - Abnormal; Notable for the following components:    CO2 22 (*)     All other components within normal limits   LIPASE   POCT URINE PREGNANCY          Imaging Results    None          Medications   aluminum-magnesium hydroxide-simethicone 200-200-20 mg/5 mL suspension 30 mL (30 mLs Oral Given 11/15/23 2113)   famotidine (PF) injection 20 mg (20 mg Intravenous Given 11/15/23 2113)   pantoprazole EC tablet 40 mg (40 mg Oral  Given 11/15/23 2233)     Medical Decision Making  DDX: PUD, GERD, gastritis, cholelithiasis, cholecysitis  No elevated liver enzymes and her abdomen is not tender  Possible ulcer or food intolerance  Will start her on a PPI and have her discuss getting an upper GI with her GI doctor  Discharged to home in stable condition, return to ED warnings given, follow up and patient care instructions given.        Amount and/or Complexity of Data Reviewed  Labs: ordered.    Risk  Prescription drug management.                               Clinical Impression:   Final diagnoses:  [R10.13] Epigastric abdominal pain (Primary)        ED Disposition Condition    Discharge Stable          ED Prescriptions       Medication Sig Dispense Start Date End Date Auth. Provider    famotidine (PEPCID) 20 MG tablet Take 1 tablet (20 mg total) by mouth 2 (two) times daily. for 14 days 28 tablet 11/15/2023 11/29/2023 Indiana Johnson MD    pantoprazole (PROTONIX) 20 MG tablet Take 1 tablet (20 mg total) by mouth once daily. 30 tablet 11/15/2023 12/16/2023 Indiana Johnson MD          Follow-up Information    None          Indiana Johnson MD  11/16/23 6304

## 2023-11-16 NOTE — FIRST PROVIDER EVALUATION
" Emergency Department TeleTriage Encounter Note      CHIEF COMPLAINT    Chief Complaint   Patient presents with    Abdominal Pain     Abdominal pain x 2 days, worsening last night. Described as "constant, dull burn" epigastric. + Diarrhea. Denies n/v.  States pepto as relived to some degree, but does not completely alleviate. Pain 6/10.        VITAL SIGNS   Initial Vitals [11/15/23 1843]   BP Pulse Resp Temp SpO2   (!) 139/92 97 18 99.5 °F (37.5 °C) 98 %      MAP       --            ALLERGIES    Review of patient's allergies indicates:   Allergen Reactions    Bactrim [sulfamethoxazole-trimethoprim] Itching       PROVIDER TRIAGE NOTE  Female presents with symptoms of dull aching burning pain in the epigastric region.  Followed by GI and scheduled for colonoscopy soon.  States that the pain was worse tonight.  Took Pepto-Bismol the past 3 days without significant relief.  Denies any chest pain or shortness a breath.  No nausea or vomiting.      ORDERS  Labs Reviewed   COMPREHENSIVE METABOLIC PANEL   CBC W/ AUTO DIFFERENTIAL   LIPASE   URINALYSIS, REFLEX TO URINE CULTURE   POCT URINE PREGNANCY       ED Orders (720h ago, onward)      Start Ordered     Status Ordering Provider    11/15/23 1921 11/15/23 1921  Insert Saline lock IV  Once         Ordered BRYCE, HOWARD S.    11/15/23 1921 11/15/23 1921  Comprehensive metabolic panel  STAT         Ordered BRYCE, HOWARD S.    11/15/23 1921 11/15/23 1921  CBC auto differential  STAT         Ordered BRYCE, HOWARD S.    11/15/23 1921 11/15/23 1921  Lipase  STAT         Ordered BRYCE, HOWARD S.    11/15/23 1921 11/15/23 1921  Urinalysis, Reflex to Urine Culture Urine, Clean Catch  STAT         Ordered BRYCE, HOWARD S.    11/15/23 1921 11/15/23 1921  POCT urine pregnancy  Once         Ordered BRYCE, HOWARD S.              Virtual Visit Note: The provider triage portion of this emergency department evaluation and documentation was performed " via [employer], a HIPAA-compliant telemedicine application, in concert with a tele-presenter in the room. A face to face patient evaluation with one of my colleagues will occur once the patient is placed in an emergency department room.      DISCLAIMER: This note was prepared with [employer] voice recognition transcription software. Garbled syntax, mangled pronouns, and other bizarre constructions may be attributed to that software system.

## 2023-11-16 NOTE — ED NOTES
Patient identifiers verified and correct for MS Aldana  C/C: ABd pain, burning, diarrhea SEE NN  APPEARANCE: awake and alert in NAD. PAIN  6/10  SKIN: warm, dry and intact. No breakdown or bruising.  MUSCULOSKELETAL: Patient moving all extremities spontaneously, no obvious swelling or deformities noted. Ambulates independently.  RESPIRATORY: Denies shortness of breath.Respirations unlabored.   CARDIAC: Denies CP, 2+ distal pulses; no peripheral edema  ABDOMEN: Abdomen soft, reports liliana mupper abd pain, denies nausea, vomiting   : voids spontaneously, denies difficulty  Neurologic: AAO x 4; follows commands equal strength in all extremities; denies numbness/tingling. Denies dizziness Denies new weakness

## 2023-11-17 ENCOUNTER — LAB VISIT (OUTPATIENT)
Dept: LAB | Facility: HOSPITAL | Age: 33
End: 2023-11-17
Attending: INTERNAL MEDICINE
Payer: COMMERCIAL

## 2023-11-17 DIAGNOSIS — R31.9 HEMATURIA, UNSPECIFIED TYPE: ICD-10-CM

## 2023-11-17 LAB
BACTERIA #/AREA URNS AUTO: NORMAL /HPF
BILIRUB UR QL STRIP: NEGATIVE
CLARITY UR REFRACT.AUTO: CLEAR
COLOR UR AUTO: ABNORMAL
GLUCOSE UR QL STRIP: NEGATIVE
HGB UR QL STRIP: NEGATIVE
KETONES UR QL STRIP: NEGATIVE
LEUKOCYTE ESTERASE UR QL STRIP: ABNORMAL
MICROSCOPIC COMMENT: NORMAL
NITRITE UR QL STRIP: NEGATIVE
PH UR STRIP: 8 [PH] (ref 5–8)
PROT UR QL STRIP: NEGATIVE
RBC #/AREA URNS AUTO: 2 /HPF (ref 0–4)
SP GR UR STRIP: 1 (ref 1–1.03)
SQUAMOUS #/AREA URNS AUTO: 2 /HPF
URN SPEC COLLECT METH UR: ABNORMAL
WBC #/AREA URNS AUTO: 2 /HPF (ref 0–5)

## 2023-11-17 PROCEDURE — 87077 CULTURE AEROBIC IDENTIFY: CPT | Performed by: INTERNAL MEDICINE

## 2023-11-17 PROCEDURE — 87088 URINE BACTERIA CULTURE: CPT | Performed by: INTERNAL MEDICINE

## 2023-11-17 PROCEDURE — 87186 SC STD MICRODIL/AGAR DIL: CPT | Performed by: INTERNAL MEDICINE

## 2023-11-17 PROCEDURE — 81001 URINALYSIS AUTO W/SCOPE: CPT | Performed by: INTERNAL MEDICINE

## 2023-11-17 PROCEDURE — 87086 URINE CULTURE/COLONY COUNT: CPT | Performed by: INTERNAL MEDICINE

## 2023-11-18 ENCOUNTER — OFFICE VISIT (OUTPATIENT)
Dept: URGENT CARE | Facility: CLINIC | Age: 33
End: 2023-11-18
Payer: COMMERCIAL

## 2023-11-18 ENCOUNTER — NURSE TRIAGE (OUTPATIENT)
Dept: ADMINISTRATIVE | Facility: CLINIC | Age: 33
End: 2023-11-18
Payer: COMMERCIAL

## 2023-11-18 VITALS
SYSTOLIC BLOOD PRESSURE: 114 MMHG | HEART RATE: 87 BPM | WEIGHT: 153 LBS | DIASTOLIC BLOOD PRESSURE: 74 MMHG | RESPIRATION RATE: 18 BRPM | BODY MASS INDEX: 30.04 KG/M2 | OXYGEN SATURATION: 98 % | HEIGHT: 60 IN | TEMPERATURE: 99 F

## 2023-11-18 DIAGNOSIS — N39.0 ACUTE UTI: Primary | ICD-10-CM

## 2023-11-18 DIAGNOSIS — R30.0 DYSURIA: ICD-10-CM

## 2023-11-18 LAB
B-HCG UR QL: NEGATIVE
BILIRUB UR QL STRIP: NEGATIVE
CTP QC/QA: YES
GLUCOSE UR QL STRIP: NEGATIVE
KETONES UR QL STRIP: NEGATIVE
LEUKOCYTE ESTERASE UR QL STRIP: POSITIVE
PH, POC UA: 8 (ref 5–8)
POC BLOOD, URINE: POSITIVE
POC NITRATES, URINE: NEGATIVE
PROT UR QL STRIP: NEGATIVE
SP GR UR STRIP: 1.01 (ref 1–1.03)
UROBILINOGEN UR STRIP-ACNC: ABNORMAL (ref 0.1–1.1)

## 2023-11-18 PROCEDURE — 81025 URINE PREGNANCY TEST: CPT | Mod: S$GLB,,,

## 2023-11-18 PROCEDURE — 81003 URINALYSIS AUTO W/O SCOPE: CPT | Mod: QW,S$GLB,,

## 2023-11-18 PROCEDURE — 99213 OFFICE O/P EST LOW 20 MIN: CPT | Mod: S$GLB,,,

## 2023-11-18 PROCEDURE — 81025 POCT URINE PREGNANCY: ICD-10-PCS | Mod: S$GLB,,,

## 2023-11-18 PROCEDURE — 99213 PR OFFICE/OUTPT VISIT, EST, LEVL III, 20-29 MIN: ICD-10-PCS | Mod: S$GLB,,,

## 2023-11-18 PROCEDURE — 81003 POCT URINALYSIS, DIPSTICK, AUTOMATED, W/O SCOPE: ICD-10-PCS | Mod: QW,S$GLB,,

## 2023-11-18 RX ORDER — PHENAZOPYRIDINE HYDROCHLORIDE 200 MG/1
200 TABLET, FILM COATED ORAL 3 TIMES DAILY PRN
Qty: 30 TABLET | Refills: 0 | Status: SHIPPED | OUTPATIENT
Start: 2023-11-18 | End: 2023-11-28

## 2023-11-18 RX ORDER — NITROFURANTOIN 25; 75 MG/1; MG/1
100 CAPSULE ORAL 2 TIMES DAILY
Qty: 10 CAPSULE | Refills: 0 | Status: SHIPPED | OUTPATIENT
Start: 2023-11-18 | End: 2023-11-23

## 2023-11-18 NOTE — TELEPHONE ENCOUNTER
[place]    PCP:  Dr. Wang Salvador    She reports redid a urine test yesterday.  She states that she wants to make sure that she doesn't have an UTI.  She reports urine showed Leukocytes but MD hasn't reviewed results yet.  C/O burning with urination, abdominal cramping, low-grade temp, pelvic pressure, and frequency.  Denies blood in urine, fever, flank pain, and back pain.  Per protocol, care advised is see PCP within 24 hrs.  Advised OD VV/UCC/ED.  Pt VU and will go to UCC.  Advised to call for worsening/questions/concerns.  VU.    Reason for Disposition   All other patients with painful urination  (Exception: [1] EITHER frequency or urgency AND [2] has on-call doctor.)    Additional Information   Negative: Shock suspected (e.g., cold/pale/clammy skin, too weak to stand, low BP, rapid pulse)   Negative: Sounds like a life-threatening emergency to the triager   [1] Pain or burning with passing urine (urination) AND [2] female   Negative: Shock suspected (e.g., cold/pale/clammy skin, too weak to stand, low BP, rapid pulse)   Negative: Sounds like a life-threatening emergency to the triager   Negative: [1] Unable to urinate (or only a few drops) > 4 hours AND [2] bladder feels very full (e.g., palpable bladder or strong urge to urinate)   Negative: Vomiting   Negative: Patient sounds very sick or weak to the triager   Negative: [1] SEVERE pain with urination (e.g., excruciating) AND [2] not improved after 2 hours of pain medicine and Sitz bath   Negative: Fever > 100.4 F (38.0 C)   Negative: Side (flank) or lower back pain present   Negative: Diabetes mellitus or weak immune system (e.g., HIV positive, cancer chemo, splenectomy, organ transplant, chronic steroids)   Negative: Bedridden (e.g., CVA, chronic illness, recovering from surgery)   Negative: Artificial heart valve or artificial joint   Negative: Unusual vaginal discharge (e.g., bad smelling, yellow, green, or foamy-white)   Negative: Patient is worried they have  a sexually transmitted infection (STI)   Negative: Possibility of pregnancy   Negative: Blood in urine (red, pink, or tea-colored)   Negative: Age > 50 years   Negative: > 2 UTI's in last year    Protocols used: Urinary Symptoms-A-AH, Urination Pain - Female-A-AH

## 2023-11-18 NOTE — PATIENT INSTRUCTIONS
- Today your urine test shows that you have a UTI.   - Take pyridium or AZO for your symptoms. Beware, this will turn your urine orange and can turn tears orange.      - Rest.    - Drink plenty of fluids.      - You must understand that you have received an Urgent Care treatment only and that you may be released before all of your medical problems are known or treated.   - You, the patient, will arrange for follow up care as instructed.   - If your condition worsens or fails to improve we recommend that you receive another evaluation at the ER immediately or contact your PCP to discuss your concerns or return here.   - Follow up with your PCP or specialty clinic as directed in the next 1-2 weeks if not improved or as needed.  You can call [phone] to schedule an appointment with the appropriate provider.    If your symptoms do not improve or worsen, go to the emergency room immediately.

## 2023-11-18 NOTE — PROGRESS NOTES
Subjective:      Patient ID: Tamara Aldana is a 33 y.o. female.    Vitals:  height is 5' (1.524 m) and weight is 69.4 kg (153 lb). Her oral temperature is 98.6 °F (37 °C). Her blood pressure is 114/74 and her pulse is 87. Her respiration is 18 and oxygen saturation is 98%.     Chief Complaint: Dysuria    This is a 33 y.o. female who presents today with a chief complaint of Dysuria . Patient has had bladder pressure for about a week . Burning sensation started on yesterday. She has noticed an increase in urine odor.       Dysuria   This is a new problem. The current episode started in the past 7 days. The problem occurs every urination. The quality of the pain is described as burning. The pain is at a severity of 6/10. The pain is moderate. There has been no fever. She is Sexually active. There is No history of pyelonephritis. Associated symptoms include frequency and urgency. She has tried nothing for the symptoms. The treatment provided no relief.       Gastrointestinal:  Positive for abdominal pain (pelvic pressure).   Genitourinary:  Positive for dysuria, frequency and urgency.      Objective:     Physical Exam   Constitutional: She is oriented to person, place, and time. She appears well-developed.      Comments:Patient sits comfortably in exam chair. Answers questions in complete sentences. Does not show any signs of distress or discoloration.        HENT:   Head: Normocephalic and atraumatic.   Ears:   Right Ear: External ear normal.   Left Ear: External ear normal.   Nose: Nose normal. No nasal deformity. No epistaxis.   Mouth/Throat: Oropharynx is clear and moist and mucous membranes are normal.   Eyes: Lids are normal.   Neck: Trachea normal and phonation normal. Neck supple.   Cardiovascular: Normal pulses.   Pulmonary/Chest: Effort normal.   Abdominal: Normal appearance.   Neurological: She is alert and oriented to person, place, and time.   Skin: Skin is warm, dry and intact.   Psychiatric: Her  speech is normal and behavior is normal.   Nursing note and vitals reviewed.    Results for orders placed or performed in visit on 11/18/23   POCT Urinalysis, Dipstick, Automated, W/O Scope   Result Value Ref Range    POC Blood, Urine Positive (A) Negative    POC Bilirubin, Urine Negative Negative    POC Urobilinogen, Urine norm 0.1 - 1.1    POC Ketones, Urine Negative Negative    POC Protein, Urine Negative Negative    POC Nitrates, Urine Negative Negative    POC Glucose, Urine Negative Negative    pH, UA 8.0 5 - 8    POC Specific Gravity, Urine 1.010 1.003 - 1.029    POC Leukocytes, Urine Positive (A) Negative   POCT urine pregnancy   Result Value Ref Range    POC Preg Test, Ur Negative Negative     Acceptable Yes        Assessment:     1. Acute UTI    2. Dysuria        Plan:   Patient has a urine culture pending from her PCP that she gave a sample for yesterday. Will prescribed macrobid at this time as results show preliminary gram negative rids. Advise that if results come back tomorrow and bacteria is resistant she can call us since her PCPs office will be closed.     Acute UTI  -     nitrofurantoin, macrocrystal-monohydrate, (MACROBID) 100 MG capsule; Take 1 capsule (100 mg total) by mouth 2 (two) times daily. for 5 days  Dispense: 10 capsule; Refill: 0  -     phenazopyridine (PYRIDIUM) 200 MG tablet; Take 1 tablet (200 mg total) by mouth 3 (three) times daily as needed for Pain.  Dispense: 30 tablet; Refill: 0    Dysuria  -     POCT Urinalysis, Dipstick, Automated, W/O Scope  -     POCT urine pregnancy                Patient Instructions   - Today your urine test shows that you have a UTI.   - Take pyridium or AZO for your symptoms. Beware, this will turn your urine orange and can turn tears orange.      - Rest.    - Drink plenty of fluids.      - You must understand that you have received an Urgent Care treatment only and that you may be released before all of your medical problems are known  or treated.   - You, the patient, will arrange for follow up care as instructed.   - If your condition worsens or fails to improve we recommend that you receive another evaluation at the ER immediately or contact your PCP to discuss your concerns or return here.   - Follow up with your PCP or specialty clinic as directed in the next 1-2 weeks if not improved or as needed.  You can call [phone] to schedule an appointment with the appropriate provider.    If your symptoms do not improve or worsen, go to the emergency room immediately.

## 2023-11-19 ENCOUNTER — PATIENT MESSAGE (OUTPATIENT)
Dept: PRIMARY CARE CLINIC | Facility: CLINIC | Age: 33
End: 2023-11-19
Payer: COMMERCIAL

## 2023-11-20 LAB — BACTERIA UR CULT: ABNORMAL

## 2023-11-30 ENCOUNTER — TELEPHONE (OUTPATIENT)
Dept: ENDOSCOPY | Facility: HOSPITAL | Age: 33
End: 2023-11-30
Payer: COMMERCIAL

## 2023-11-30 NOTE — TELEPHONE ENCOUNTER
Spoke with patient about arrival time @ 1245.   Colon/Suprep    Prep instructions reviewed: the day before the procedure, follow a clear liquid diet all day, then start the first 1/2 of prep at 5pm and take 2nd 1/2 of prep @ 0815.  Pt must be completely NPO when prep completed @ 1015.   Suprep inst sent to portal.         Medications: Do not take Insulin or oral diabetic medications the day of the procedure.  Take as prescribed: heart, seizure and blood pressure medication in the morning with a sip of water (less than an ounce).  Take any breathing medications and bring inhalers to hospital with you Leave all valuables and jewelry at home.     Wear comfortable clothes to procedure to change into hospital gown You cannot drive for 24 hours after your procedure because you will receive sedation for your procedure to make you comfortable.  A ride must be provided at discharge.

## 2023-11-30 NOTE — TELEPHONE ENCOUNTER
Left messages instructing patient to call dept @ [phone] between 8am-3pm.    Arrival time to be given @ 2615  EGD/Colon (Suprep)  Inst in AVS 10/17  (Message sent via [facility] portal)

## 2023-12-04 ENCOUNTER — ANESTHESIA (OUTPATIENT)
Dept: ENDOSCOPY | Facility: HOSPITAL | Age: 33
End: 2023-12-04
Payer: COMMERCIAL

## 2023-12-04 ENCOUNTER — HOSPITAL ENCOUNTER (OUTPATIENT)
Facility: HOSPITAL | Age: 33
Discharge: HOME | End: 2023-12-04
Attending: INTERNAL MEDICINE | Admitting: INTERNAL MEDICINE
Payer: COMMERCIAL

## 2023-12-04 ENCOUNTER — ANESTHESIA EVENT (OUTPATIENT)
Dept: ENDOSCOPY | Facility: HOSPITAL | Age: 33
End: 2023-12-04
Payer: COMMERCIAL

## 2023-12-04 VITALS
HEIGHT: 61 IN | TEMPERATURE: 98 F | RESPIRATION RATE: 20 BRPM | OXYGEN SATURATION: 99 % | SYSTOLIC BLOOD PRESSURE: 126 MMHG | DIASTOLIC BLOOD PRESSURE: 77 MMHG | HEART RATE: 104 BPM | BODY MASS INDEX: 27.38 KG/M2 | WEIGHT: 145 LBS

## 2023-12-04 DIAGNOSIS — R10.9 ABDOMINAL PAIN: ICD-10-CM

## 2023-12-04 LAB
B-HCG UR QL: NEGATIVE
CTP QC/QA: YES

## 2023-12-04 PROCEDURE — 43239 EGD BIOPSY SINGLE/MULTIPLE: CPT | Mod: ,,, | Performed by: INTERNAL MEDICINE

## 2023-12-04 PROCEDURE — 45385 COLONOSCOPY W/LESION REMOVAL: CPT | Mod: ,,, | Performed by: INTERNAL MEDICINE

## 2023-12-04 PROCEDURE — D9220A PRA ANESTHESIA: Mod: CRNA,,, | Performed by: NURSE ANESTHETIST, CERTIFIED REGISTERED

## 2023-12-04 PROCEDURE — 88342 IMHCHEM/IMCYTCHM 1ST ANTB: CPT | Mod: 26,,, | Performed by: PATHOLOGY

## 2023-12-04 PROCEDURE — 27201089 HC SNARE, DISP (ANY): Performed by: INTERNAL MEDICINE

## 2023-12-04 PROCEDURE — 37000008 HC ANESTHESIA 1ST 15 MINUTES: Performed by: INTERNAL MEDICINE

## 2023-12-04 PROCEDURE — 45380 COLONOSCOPY AND BIOPSY: CPT | Mod: 59 | Performed by: INTERNAL MEDICINE

## 2023-12-04 PROCEDURE — D9220A PRA ANESTHESIA: Mod: ANES,,, | Performed by: UROLOGY

## 2023-12-04 PROCEDURE — 88342 IMHCHEM/IMCYTCHM 1ST ANTB: CPT | Performed by: PATHOLOGY

## 2023-12-04 PROCEDURE — 45385 COLONOSCOPY W/LESION REMOVAL: CPT | Performed by: INTERNAL MEDICINE

## 2023-12-04 PROCEDURE — D9220A PRA ANESTHESIA: ICD-10-PCS | Mod: ANES,,, | Performed by: UROLOGY

## 2023-12-04 PROCEDURE — 27201012 HC FORCEPS, HOT/COLD, DISP: Performed by: INTERNAL MEDICINE

## 2023-12-04 PROCEDURE — 81025 URINE PREGNANCY TEST: CPT | Performed by: INTERNAL MEDICINE

## 2023-12-04 PROCEDURE — 88305 TISSUE EXAM BY PATHOLOGIST: ICD-10-PCS | Mod: 26,,, | Performed by: PATHOLOGY

## 2023-12-04 PROCEDURE — 88342 CHG IMMUNOCYTOCHEMISTRY: ICD-10-PCS | Mod: 26,,, | Performed by: PATHOLOGY

## 2023-12-04 PROCEDURE — 45380 PR COLONOSCOPY,BIOPSY: ICD-10-PCS | Mod: 59,,, | Performed by: INTERNAL MEDICINE

## 2023-12-04 PROCEDURE — 45385 PR COLONOSCOPY,REMV LESN,SNARE: ICD-10-PCS | Mod: ,,, | Performed by: INTERNAL MEDICINE

## 2023-12-04 PROCEDURE — 25000003 PHARM REV CODE 250: Performed by: INTERNAL MEDICINE

## 2023-12-04 PROCEDURE — 88305 TISSUE EXAM BY PATHOLOGIST: CPT | Mod: 26,,, | Performed by: PATHOLOGY

## 2023-12-04 PROCEDURE — D9220A PRA ANESTHESIA: ICD-10-PCS | Mod: CRNA,,, | Performed by: NURSE ANESTHETIST, CERTIFIED REGISTERED

## 2023-12-04 PROCEDURE — 88305 TISSUE EXAM BY PATHOLOGIST: CPT | Performed by: PATHOLOGY

## 2023-12-04 PROCEDURE — 63600175 PHARM REV CODE 636 W HCPCS: Performed by: NURSE ANESTHETIST, CERTIFIED REGISTERED

## 2023-12-04 PROCEDURE — 25000003 PHARM REV CODE 250: Performed by: NURSE ANESTHETIST, CERTIFIED REGISTERED

## 2023-12-04 PROCEDURE — 37000009 HC ANESTHESIA EA ADD 15 MINS: Performed by: INTERNAL MEDICINE

## 2023-12-04 PROCEDURE — 43239 EGD BIOPSY SINGLE/MULTIPLE: CPT | Performed by: INTERNAL MEDICINE

## 2023-12-04 PROCEDURE — 45380 COLONOSCOPY AND BIOPSY: CPT | Mod: 59,,, | Performed by: INTERNAL MEDICINE

## 2023-12-04 PROCEDURE — 43239 PR EGD, FLEX, W/BIOPSY, SGL/MULTI: ICD-10-PCS | Mod: ,,, | Performed by: INTERNAL MEDICINE

## 2023-12-04 RX ORDER — PROPOFOL 10 MG/ML
VIAL (ML) INTRAVENOUS
Status: DISCONTINUED | OUTPATIENT
Start: 2023-12-04 | End: 2023-12-04

## 2023-12-04 RX ORDER — PROPOFOL 10 MG/ML
VIAL (ML) INTRAVENOUS CONTINUOUS PRN
Status: DISCONTINUED | OUTPATIENT
Start: 2023-12-04 | End: 2023-12-04

## 2023-12-04 RX ORDER — LIDOCAINE HYDROCHLORIDE 20 MG/ML
INJECTION, SOLUTION EPIDURAL; INFILTRATION; INTRACAUDAL; PERINEURAL
Status: DISCONTINUED | OUTPATIENT
Start: 2023-12-04 | End: 2023-12-04

## 2023-12-04 RX ORDER — DEXTROMETHORPHAN/PSEUDOEPHED 2.5-7.5/.8
DROPS ORAL
Status: COMPLETED | OUTPATIENT
Start: 2023-12-04 | End: 2023-12-04

## 2023-12-04 RX ORDER — SODIUM CHLORIDE 9 MG/ML
INJECTION, SOLUTION INTRAVENOUS CONTINUOUS
Status: DISCONTINUED | OUTPATIENT
Start: 2023-12-04 | End: 2023-12-04 | Stop reason: HOSPADM

## 2023-12-04 RX ADMIN — LIDOCAINE HYDROCHLORIDE 80 MG: 20 INJECTION, SOLUTION EPIDURAL; INFILTRATION; INTRACAUDAL; PERINEURAL at 03:12

## 2023-12-04 RX ADMIN — PROPOFOL 150 MCG/KG/MIN: 10 INJECTION, EMULSION INTRAVENOUS at 03:12

## 2023-12-04 RX ADMIN — SODIUM CHLORIDE: 9 INJECTION, SOLUTION INTRAVENOUS at 02:12

## 2023-12-04 RX ADMIN — GLYCOPYRROLATE 0.2 MG: 0.2 INJECTION, SOLUTION INTRAMUSCULAR; INTRAVITREAL at 03:12

## 2023-12-04 RX ADMIN — SODIUM CHLORIDE: 0.9 INJECTION, SOLUTION INTRAVENOUS at 03:12

## 2023-12-04 RX ADMIN — PROPOFOL 100 MG: 10 INJECTION, EMULSION INTRAVENOUS at 03:12

## 2023-12-04 NOTE — ANESTHESIA POSTPROCEDURE EVALUATION
Anesthesia Post Evaluation    Patient: Tamara Aldana    Procedure(s) Performed: Procedure(s) (LRB):  COLONOSCOPY (N/A)  EGD (ESOPHAGOGASTRODUODENOSCOPY) (N/A)    Final Anesthesia Type: general      Patient location during evaluation: GI PACU  Patient participation: Yes- Able to Participate  Level of consciousness: awake and alert and oriented  Post-procedure vital signs: reviewed and stable  Pain management: adequate  Airway patency: patent    PONV status at discharge: No PONV  Anesthetic complications: no      Cardiovascular status: hemodynamically stable  Respiratory status: unassisted  Hydration status: euvolemic  Follow-up not needed.              Vitals Value Taken Time   /77 12/04/23 1553   Temp 36.5 °C (97.7 °F) 12/04/23 1540   Pulse 104 12/04/23 1553   Resp 20 12/04/23 1553   SpO2 99 % 12/04/23 1553         Event Time   Out of Recovery 16:07:48         Pain/Jack Score: Jack Score: 10 (12/4/2023  3:54 PM)

## 2023-12-04 NOTE — PROVATION PATIENT INSTRUCTIONS
Discharge Summary/Instructions after an Endoscopic Procedure  Patient Name: Tamara Aldana  Patient MRN: [id number]  Patient YOB: 1990 Monday, December 4, 2023  Brayan Ledezma MD  Dear patient,  As a result of recent federal legislation (The Federal Cures Act), you may   receive lab or pathology results from your procedure in your [facility]   account before your physician is able to contact you. Your physician or   their representative will relay the results to you with their   recommendations at their soonest availability.  Thank you,  Your health is very important to us during the Covid Crisis. Following your   procedure today, you will receive a daily text for 2 weeks asking about   signs or symptoms of Covid 19.  Please respond to this text when you   receive it so we can follow up and keep you as safe as possible.   RESTRICTIONS:  During your procedure today, you received medications for sedation.  These   medications may affect your judgment, balance and coordination.  Therefore,   for 24 hours, you have the following restrictions:   - DO NOT drive a car, operate machinery, make legal/financial decisions,   sign important papers or drink alcohol.    ACTIVITY:  Today: no heavy lifting, straining or running due to procedural   sedation/anesthesia.  The following day: return to full activity including work.  DIET:  Eat and drink normally unless instructed otherwise.     TREATMENT FOR COMMON SIDE EFFECTS:  - Mild abdominal pain, nausea, belching, bloating or excessive gas:  rest,   eat lightly and use a heating pad.  - Sore Throat: treat with throat lozenges and/or gargle with warm salt   water.  - Because air was used during the procedure, expelling large amounts of air   from your rectum or belching is normal.  - If a bowel prep was taken, you may not have a bowel movement for 1-3 days.    This is normal.  SYMPTOMS TO WATCH FOR AND REPORT TO YOUR PHYSICIAN:  1. Abdominal pain or bloating,  other than gas cramps.  2. Chest pain.  3. Back pain.  4. Signs of infection such as: chills or fever occurring within 24 hours   after the procedure.  5. Rectal bleeding, which would show as bright red, maroon, or black stools.   (A tablespoon of blood from the rectum is not serious, especially if   hemorrhoids are present.)  6. Vomiting.  7. Weakness or dizziness.  GO DIRECTLY TO THE NEAREST EMERGENCY ROOM IF YOU HAVE ANY OF THE FOLLOWING:      Difficulty breathing              Chills and/or fever over 101 F   Persistent vomiting and/or vomiting blood   Severe abdominal pain   Severe chest pain   Black, tarry stools   Bleeding- more than one tablespoon   Any other symptom or condition that you feel may need urgent attention  Your doctor recommends these additional instructions:  If any biopsies were taken, your doctors clinic will contact you in 1 to 2   weeks with any results.  - Discharge patient to home.   - Resume previous diet.   - Continue present medications.   - Await pathology results.   - Repeat colonoscopy in 5 years for surveillance.   - Patient has a contact number available for emergencies.  The signs and   symptoms of potential delayed complications were discussed with the   patient.  Return to normal activities tomorrow.  Written discharge   instructions were provided to the patient.  For questions, problems or results please call your physician - Brayan Ledezma MD.  EMERGENCY PHONE NUMBER: [phone],  LAB RESULTS: [phone]  IF A COMPLICATION OR EMERGENCY SITUATION ARISES AND YOU ARE UNABLE TO REACH   YOUR PHYSICIAN - GO DIRECTLY TO THE EMERGENCY ROOM.  Brayan Ledezma MD  12/4/2023 3:37:18 PM  This report has been verified and signed electronically.  Dear patient,  As a result of recent federal legislation (The Federal Cures Act), you may   receive lab or pathology results from your procedure in your [facility]   account before your physician is able to contact you.  Your physician or   their representative will relay the results to you with their   recommendations at their soonest availability.  Thank you,  PROVATION

## 2023-12-04 NOTE — TRANSFER OF CARE
"Anesthesia Transfer of Care Note    Patient: Tamara Aldana    Procedure(s) Performed: Procedure(s) (LRB):  COLONOSCOPY (N/A)  EGD (ESOPHAGOGASTRODUODENOSCOPY) (N/A)    Patient location: GI    Anesthesia Type: general    Transport from OR: Transported from OR on room air with adequate spontaneous ventilation    Post pain: adequate analgesia    Post assessment: no apparent anesthetic complications    Post vital signs: stable    Level of consciousness: awake and alert    Nausea/Vomiting: no nausea/vomiting    Complications: none    Transfer of care protocol was followed      Last vitals: Visit Vitals  /72 (BP Location: Left arm, Patient Position: Lying)   Pulse 108   Temp 37.2 °C (99 °F) (Skin)   Resp 18   Ht 5' 1" (1.549 m)   Wt 65.8 kg (145 lb)   LMP 11/23/2023 (Exact Date)   SpO2 100%   Breastfeeding No   BMI 27.40 kg/m²     "

## 2023-12-04 NOTE — PROVATION PATIENT INSTRUCTIONS
Discharge Summary/Instructions after an Endoscopic Procedure  Patient Name: Tamara Aldana  Patient MRN: [id number]  Patient YOB: 1990 Monday, December 4, 2023  Brayan Ledezma MD  Dear patient,  As a result of recent federal legislation (The Federal Cures Act), you may   receive lab or pathology results from your procedure in your [facility]   account before your physician is able to contact you. Your physician or   their representative will relay the results to you with their   recommendations at their soonest availability.  Thank you,  Your health is very important to us during the Covid Crisis. Following your   procedure today, you will receive a daily text for 2 weeks asking about   signs or symptoms of Covid 19.  Please respond to this text when you   receive it so we can follow up and keep you as safe as possible.   RESTRICTIONS:  During your procedure today, you received medications for sedation.  These   medications may affect your judgment, balance and coordination.  Therefore,   for 24 hours, you have the following restrictions:   - DO NOT drive a car, operate machinery, make legal/financial decisions,   sign important papers or drink alcohol.    ACTIVITY:  Today: no heavy lifting, straining or running due to procedural   sedation/anesthesia.  The following day: return to full activity including work.  DIET:  Eat and drink normally unless instructed otherwise.     TREATMENT FOR COMMON SIDE EFFECTS:  - Mild abdominal pain, nausea, belching, bloating or excessive gas:  rest,   eat lightly and use a heating pad.  - Sore Throat: treat with throat lozenges and/or gargle with warm salt   water.  - Because air was used during the procedure, expelling large amounts of air   from your rectum or belching is normal.  - If a bowel prep was taken, you may not have a bowel movement for 1-3 days.    This is normal.  SYMPTOMS TO WATCH FOR AND REPORT TO YOUR PHYSICIAN:  1. Abdominal pain or bloating,  other than gas cramps.  2. Chest pain.  3. Back pain.  4. Signs of infection such as: chills or fever occurring within 24 hours   after the procedure.  5. Rectal bleeding, which would show as bright red, maroon, or black stools.   (A tablespoon of blood from the rectum is not serious, especially if   hemorrhoids are present.)  6. Vomiting.  7. Weakness or dizziness.  GO DIRECTLY TO THE NEAREST EMERGENCY ROOM IF YOU HAVE ANY OF THE FOLLOWING:      Difficulty breathing              Chills and/or fever over 101 F   Persistent vomiting and/or vomiting blood   Severe abdominal pain   Severe chest pain   Black, tarry stools   Bleeding- more than one tablespoon   Any other symptom or condition that you feel may need urgent attention  Your doctor recommends these additional instructions:  If any biopsies were taken, your doctors clinic will contact you in 1 to 2   weeks with any results.  - Discharge patient to home.   - Resume previous diet.   - Continue present medications.   - Await pathology results.   - Perform a colonoscopy as previously scheduled.  For questions, problems or results please call your physician - Brayan Ledezma MD.  EMERGENCY PHONE NUMBER: [phone],  LAB RESULTS: [phone]  IF A COMPLICATION OR EMERGENCY SITUATION ARISES AND YOU ARE UNABLE TO REACH   YOUR PHYSICIAN - GO DIRECTLY TO THE EMERGENCY ROOM.  Brayan Ledezma MD  12/4/2023 3:20:16 PM  This report has been verified and signed electronically.  Dear patient,  As a result of recent federal legislation (The Federal Cures Act), you may   receive lab or pathology results from your procedure in your [facility]   account before your physician is able to contact you. Your physician or   their representative will relay the results to you with their   recommendations at their soonest availability.  Thank you,  PROVATION

## 2023-12-04 NOTE — H&P
Short Stay Endoscopy History and Physical    PCP - Wang Salvador MD    Procedure - EGD/colonoscopy  ASA - II  Mallampati - per anesthesia  History of Anesthesia problems - no  Family history Anesthesia problems - no     HPI:  This is a 33 y.o. female here for evaluation of : Abd pain. Diarrhea    ROS:  Constitutional: No fevers, chills, No weight loss  ENT: No allergies  CV: No chest pain  Pulm: No shortness of breath  GI: see HPI  Derm: No rash    Medical History:  has a past medical history of ADD (attention deficit disorder), Anxiety, and History of panic attacks.    Surgical History:  has a past surgical history that includes Tonsillectomy (); [place] tooth extraction (); LASIK (Bilateral, ); and  section (N/A, 2021).    Family History: family history includes Cancer in her paternal grandfather; Colon cancer in her paternal grandfather; Colon polyps in her father; Hyperlipidemia in her mother; Schizophrenia in her mother.. Otherwise no colon cancer, inflammatory bowel disease, or GI malignancies.    Social History:  reports that she has never smoked. She has never been exposed to tobacco smoke. She has never used smokeless tobacco. She reports that she does not currently use alcohol. She reports that she does not use drugs.    Review of patient's allergies indicates:   Allergen Reactions    Bactrim [sulfamethoxazole-trimethoprim] Itching       Medications:   Medications Prior to Admission   Medication Sig Dispense Refill Last Dose    benzonatate (TESSALON) 200 MG capsule Take 1 capsule (200 mg total) by mouth 3 (three) times daily as needed for Cough. (Patient not taking: Reported on 2023) 30 capsule 0     buPROPion (WELLBUTRIN XL) 150 MG TB24 tablet Take 1 tablet (150 mg total) by mouth once daily. (Patient not taking: Reported on 10/17/2023) 30 tablet 11     buPROPion (WELLBUTRIN XL) 150 MG TB24 tablet Take 1 tablet (150 mg total) by mouth. (Patient not taking: Reported on  9/30/2023) 30 tablet 2     buPROPion (WELLBUTRIN XL) 150 MG TB24 tablet Take one tab by mouth daily 90 tablet 1     busPIRone (BUSPAR) 10 MG tablet Take 1 tablet (10 mg total) by mouth 3 (three) times daily as needed. 90 tablet 1     cetirizine (ZYRTEC) 10 MG tablet Take 10 mg by mouth once daily.       famotidine (PEPCID) 20 MG tablet Take 1 tablet (20 mg total) by mouth 2 (two) times daily. for 14 days 28 tablet 0     hyoscyamine (ANASPAZ,LEVSIN) 0.125 mg Tab Take 1 tablet (125 mcg total) by mouth every 6 (six) hours as needed. 90 tablet 6     LORazepam (ATIVAN) 0.5 MG tablet Take 1 tablet (0.5 mg total) by mouth daily as needed for acute anxiety 30 tablet 2     LORazepam (ATIVAN) 0.5 MG tablet Take one tablet by mouth daily as needed for acute anxiety 30 tablet 1     pantoprazole (PROTONIX) 20 MG tablet Take 1 tablet (20 mg total) by mouth once daily. 30 tablet 0     sodium fluoride-pot nitrate (PREVIDENT 5000 SENSITIVE) 1.1-5 % Pste Use 1 to 2 times per day.  Brush, floss, mouthrinse as normal prior to using this.  Do not rinse out after use.  Do not eat or drink for 30 minutes after use. (Patient not taking: Reported on 9/30/2023) 100 mL 0          Objective Findings:    Vital Signs: see nursing notes  Physical Exam:  General Appearance: Well appearing in no acute distress  Eyes:    No scleral icterus  ENT: Neck supple  Lungs: CTA anteriorly  Heart:  S1, S2 normal, no murmurs heard  Abdomen: Soft, non tender, non distended with positive bowel sounds. No hepatosplenomegaly, ascites, or mass  Extremities: no edema  Skin: No rash      Labs:  Lab Results   Component Value Date    WBC 10.24 11/15/2023    HGB 14.0 11/15/2023    HCT 40.3 11/15/2023     11/15/2023    CHOL 153 04/04/2023    TRIG 79 04/04/2023    HDL 43 04/04/2023    ALT 16 11/15/2023    AST 15 11/15/2023     11/15/2023    K 3.5 11/15/2023     11/15/2023    CREATININE 0.8 11/15/2023    BUN 7 11/15/2023    CO2 22 (L) 11/15/2023    TSH  1.363 04/04/2023    HGBA1C 5.1 04/04/2023       I have explained the risks and benefits of endoscopy procedures to the patient including but not limited to bleeding, perforation, infection, and death.    Brayan Ledezma MD

## 2023-12-04 NOTE — ANESTHESIA PREPROCEDURE EVALUATION
2023  Tamara Aldana is a 33 y.o., female for EGD / Cscope for Abd pain and diarrhea.     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      There is no height or weight on file to calculate BMI.    Tobacco Use: Low Risk  (2023)    Patient History     Smoking Tobacco Use: Never     Smokeless Tobacco Use: Never     Passive Exposure: Never       Social History     Substance and Sexual Activity   Drug Use No        Alcohol Use: Not on file       Review of patient's allergies indicates:   Allergen Reactions    Bactrim [sulfamethoxazole-trimethoprim] Itching         Airway:  No value filed.         Pre-op Assessment    I have reviewed the Patient Summary Reports.     I have reviewed the Nursing Notes. I have reviewed the NPO Status.   I have reviewed the Medications.     Review of Systems  Anesthesia Hx:  No problems with previous Anesthesia   History of prior surgery of interest to airway management or planning:          Denies Family Hx of Anesthesia complications.    Denies Personal Hx of Anesthesia complications.                        Physical Exam  General: Well nourished, Cooperative, Alert and Oriented    Airway:  Mallampati: II / I  Mouth Opening: Normal  TM Distance: Normal  Neck ROM: Normal ROM  Pre-Existing Airway: Oral Endotracheal tube    Dental:  Intact        Anesthesia  Plan  Type of Anesthesia, risks & benefits discussed:    Anesthesia Type: Gen Natural Airway  Intra-op Monitoring Plan: Standard ASA Monitors  Post Op Pain Control Plan: multimodal analgesia and IV/PO Opioids PRN  Induction:  IV  Informed Consent: Informed consent signed with the Patient and all parties understand the risks and agree with anesthesia plan.  All questions answered. Patient consented to blood products? No  ASA Score: 2    Ready For Surgery From Anesthesia Perspective.     .

## 2023-12-07 ENCOUNTER — PATIENT MESSAGE (OUTPATIENT)
Dept: GASTROENTEROLOGY | Facility: CLINIC | Age: 33
End: 2023-12-07
Payer: COMMERCIAL

## 2023-12-07 DIAGNOSIS — A04.8 H. PYLORI INFECTION: Primary | ICD-10-CM

## 2023-12-07 LAB
COMMENT: NORMAL
FINAL PATHOLOGIC DIAGNOSIS: NORMAL
GROSS: NORMAL
Lab: NORMAL

## 2023-12-08 RX ORDER — AMOXICILLIN 500 MG/1
1000 TABLET, FILM COATED ORAL EVERY 12 HOURS
Qty: 56 TABLET | Refills: 0 | Status: SHIPPED | OUTPATIENT
Start: 2023-12-08 | End: 2023-12-23

## 2023-12-08 RX ORDER — PANTOPRAZOLE SODIUM 40 MG/1
40 TABLET, DELAYED RELEASE ORAL 2 TIMES DAILY
Qty: 28 TABLET | Refills: 0 | Status: SHIPPED | OUTPATIENT
Start: 2023-12-08 | End: 2024-02-27

## 2023-12-08 RX ORDER — CLARITHROMYCIN 500 MG/1
500 TABLET, FILM COATED ORAL EVERY 12 HOURS
Qty: 28 TABLET | Refills: 0 | Status: SHIPPED | OUTPATIENT
Start: 2023-12-08 | End: 2023-12-23

## 2023-12-08 NOTE — PROGRESS NOTES
Biopsies taken during recent EGD positive for H pylori.  Will prescribe a course of antibiotics as well as antacids, all of which to be taken twice a day for full to weeks.  Following completion of regimen a stool test will need to be performed to confirm eradication.    No cause of diarrhea noted    Colon polyp benign.  Repeat colonoscopy in 5 years.

## 2024-01-03 RX ORDER — FLUCONAZOLE 150 MG/1
150 TABLET ORAL DAILY
Qty: 1 TABLET | Refills: 0 | Status: SHIPPED | OUTPATIENT
Start: 2024-01-03 | End: 2024-01-04

## 2024-01-17 ENCOUNTER — PATIENT MESSAGE (OUTPATIENT)
Dept: PRIMARY CARE CLINIC | Facility: CLINIC | Age: 34
End: 2024-01-17
Payer: COMMERCIAL

## 2024-01-22 ENCOUNTER — LAB VISIT (OUTPATIENT)
Dept: LAB | Facility: HOSPITAL | Age: 34
End: 2024-01-22
Attending: INTERNAL MEDICINE
Payer: COMMERCIAL

## 2024-01-22 DIAGNOSIS — A04.8 H. PYLORI INFECTION: ICD-10-CM

## 2024-01-22 PROCEDURE — 87338 HPYLORI STOOL AG IA: CPT | Performed by: INTERNAL MEDICINE

## 2024-01-26 ENCOUNTER — PATIENT MESSAGE (OUTPATIENT)
Dept: GASTROENTEROLOGY | Facility: CLINIC | Age: 34
End: 2024-01-26
Payer: COMMERCIAL

## 2024-01-31 LAB — H PYLORI AG STL QL IA: NOT DETECTED

## 2024-02-01 ENCOUNTER — PATIENT MESSAGE (OUTPATIENT)
Dept: GASTROENTEROLOGY | Facility: CLINIC | Age: 34
End: 2024-02-01
Payer: COMMERCIAL

## 2024-02-01 DIAGNOSIS — K58.0 IRRITABLE BOWEL SYNDROME WITH DIARRHEA: Primary | ICD-10-CM

## 2024-02-06 ENCOUNTER — PATIENT MESSAGE (OUTPATIENT)
Dept: DERMATOLOGY | Facility: CLINIC | Age: 34
End: 2024-02-06
Payer: COMMERCIAL

## 2024-02-26 ENCOUNTER — E-VISIT (OUTPATIENT)
Dept: PRIMARY CARE CLINIC | Facility: CLINIC | Age: 34
End: 2024-02-26
Payer: COMMERCIAL

## 2024-02-26 DIAGNOSIS — B00.9 HERPES SIMPLEX INFECTION: Primary | ICD-10-CM

## 2024-02-26 PROCEDURE — 99499 UNLISTED E&M SERVICE: CPT | Mod: ,,, | Performed by: INTERNAL MEDICINE

## 2024-02-26 RX ORDER — VALACYCLOVIR HYDROCHLORIDE 1 G/1
2000 TABLET, FILM COATED ORAL 2 TIMES DAILY PRN
Qty: 60 TABLET | Refills: 1 | Status: SHIPPED | OUTPATIENT
Start: 2024-02-26 | End: 2025-02-25

## 2024-02-26 RX ORDER — ACYCLOVIR 50 MG/G
OINTMENT TOPICAL
Qty: 30 G | Refills: 2 | Status: SHIPPED | OUTPATIENT
Start: 2024-02-26

## 2024-02-26 NOTE — PROGRESS NOTES
[facility] E-Visit Note      Reason for Visit:     Chief Complaint: Rash (Entered automatically based on patient selection in Patient Portal.)    The patient initiated a request through [employer] on 2/26/2024 for evaluation and management with a chief complaint of Rash (Entered automatically based on patient selection in Patient Portal.)     History of Present Illness:     I evaluated the questionnaire submission on 2/26/24.    [facility] Peq Evisit Rash    2/26/2024 12:14 PM CST - Filed by Patient   Do you agree to participate in an E-Visit? Yes   If you have any of the following symptoms, please present to your local ER or call 911:  I acknowledge   What is the main issue that you would like for your doctor to address today? Cold sore on cheek   Are you able to take your vital signs? No   Are you currently pregnant, could you be pregnant, or are you breast feeding? None of the above   How would you describe your skin problem? Rash   When did your symptoms first appear? 2/23/2024   Where is it located?  Face   Does it itch? Yes   Does it hurt? No   Is there discharge or drainage? No   Is there bleeding? No   Describe the character Raised   Describe the color Pink   Has it changed over time? Grown in size   Frequency of skin problem Fluctuates at random   Duration of the skin problem (how long does it stay when it is present) Weeks   I have had a new exposure to No new exposures   I have had a new exposure to No new exposures   What have you used to treat the skin problem? Valtrex / acyclovir ointment  but i dont have ointment   If you have used anything for treatment, has it helped the symptoms? Yes   Other generalized symptoms that you associate with the rash No other symptoms   Provide any information you feel is important to your history not asked above Gabriella always had a recurring cold, sore on my cheek was first diagnosed with it way back when I was younger- its not in my chart. I was prescribed Valtrex for  it a few times. It looks like Im getting the coldsore again-I dont have any ointment for it.   At least one photo is required for treatment to be provided. You can upload a maximum of three photos of the affected area.            Problem List:     Patient Active Problem List   Diagnosis    Attention-deficit hyperactivity disorder, unspecified type    PROM (premature rupture of membranes)    S/P  section    Depression    Severe preeclampsia    Anxiety         Medications:       Current Outpatient Medications:     acyclovir 5% (ZOVIRAX) 5 % ointment, Apply topically every 3 (three) hours., Disp: 30 g, Rfl: 2    benzonatate (TESSALON) 200 MG capsule, Take 1 capsule (200 mg total) by mouth 3 (three) times daily as needed for Cough. (Patient not taking: Reported on 2023), Disp: 30 capsule, Rfl: 0    buPROPion (WELLBUTRIN XL) 150 MG TB24 tablet, Take 1 tablet (150 mg total) by mouth once daily. (Patient not taking: Reported on 10/17/2023), Disp: 30 tablet, Rfl: 11    buPROPion (WELLBUTRIN XL) 150 MG TB24 tablet, Take 1 tablet (150 mg total) by mouth. (Patient not taking: Reported on 2023), Disp: 30 tablet, Rfl: 2    buPROPion (WELLBUTRIN XL) 150 MG TB24 tablet, Take one tab by mouth daily, Disp: 90 tablet, Rfl: 1    busPIRone (BUSPAR) 10 MG tablet, Take 1 tablet (10 mg total) by mouth 3 (three) times daily as needed., Disp: 90 tablet, Rfl: 1    cetirizine (ZYRTEC) 10 MG tablet, Take 10 mg by mouth once daily., Disp: , Rfl:     famotidine (PEPCID) 20 MG tablet, Take 1 tablet (20 mg total) by mouth 2 (two) times daily. for 14 days, Disp: 28 tablet, Rfl: 0    hyoscyamine (ANASPAZ,LEVSIN) 0.125 mg Tab, Take 1 tablet (125 mcg total) by mouth every 6 (six) hours as needed., Disp: 90 tablet, Rfl: 6    LORazepam (ATIVAN) 0.5 MG tablet, Take 1 tablet (0.5 mg total) by mouth daily as needed for acute anxiety, Disp: 30 tablet, Rfl: 2    LORazepam (ATIVAN) 0.5 MG tablet, Take one tablet by mouth daily as needed  for acute anxiety, Disp: 30 tablet, Rfl: 1    pantoprazole (PROTONIX) 40 MG tablet, Take 1 tablet (40 mg total) by mouth 2 (two) times daily. for 14 days, Disp: 28 tablet, Rfl: 0    rifAXIMin (XIFAXAN) 550 mg Tab, Take 1 tablet (550 mg total) by mouth 3 (three) times daily. for 14 days, Disp: 42 tablet, Rfl: 0    sodium fluoride-pot nitrate (PREVIDENT 5000 SENSITIVE) 1.1-5 % Pste, Use 1 to 2 times per day.  Brush, floss, mouthrinse as normal prior to using this.  Do not rinse out after use.  Do not eat or drink for 30 minutes after use., Disp: 100 mL, Rfl: 0    valACYclovir (VALTREX) 1000 MG tablet, Take 2 tablets (2,000 mg total) by mouth 2 (two) times daily as needed (for 1 day as needed for cold sore)., Disp: 60 tablet, Rfl: 1      Labs/Imaging/Testing:     No visits with results within 7 Day(s) from this visit.   Latest known visit with results is:   Lab Visit on 01/22/2024   Component Date Value Ref Range Status    H. Pylori Antigen, Stool 01/22/2024 NOT DETECTED   Final    Comment: Reference Range:  NOT DETECTED    Antimicrobials, proton pump inhibitors, and bismuth preparations  inhibit H. pylori and ingestion up to two weeks prior to testing may  cause false negative results. If clinically indicated the test should  be repeated on a new specimen obtained two weeks after discontinuing  treatment.  Test Performed at:  [facility] [address]  [address]     DAMIAN June MD, PhD, NIKA           Assessment and Plan:     1. Herpes simplex infection     Hi- I can send the topical acylovir.  Please know that insurances may not cover this.  You can always opt to pay the cash price.  However, the valtrex orally usually is effective in reducing the severity/duration of symptoms on its own, so if the oitnment is expensive, you might opt to just use the pills.  The dose we'd use for the valtrex is usually 2000 mg once, followed by another dose 12 hours later, then  stop.  You usually only need the 2 doses and further doses usually don't have any additional benefit.  I sent rx for both to your pharmacy.    E-Visit time Tracking     Day 1 Time (in minutes): 6    Total Time (in minutes): 6       Wang Salvador MD  2/26/2024

## 2024-02-27 ENCOUNTER — OFFICE VISIT (OUTPATIENT)
Dept: INTERNAL MEDICINE | Facility: CLINIC | Age: 34
End: 2024-02-27
Payer: COMMERCIAL

## 2024-02-27 VITALS
WEIGHT: 145 LBS | DIASTOLIC BLOOD PRESSURE: 70 MMHG | HEIGHT: 61 IN | SYSTOLIC BLOOD PRESSURE: 125 MMHG | BODY MASS INDEX: 27.38 KG/M2

## 2024-02-27 DIAGNOSIS — B02.9 HERPES ZOSTER WITHOUT COMPLICATION: Primary | ICD-10-CM

## 2024-02-27 PROBLEM — O42.90 PROM (PREMATURE RUPTURE OF MEMBRANES): Status: RESOLVED | Noted: 2021-08-02 | Resolved: 2024-02-27

## 2024-02-27 PROCEDURE — 99214 OFFICE O/P EST MOD 30 MIN: CPT | Mod: S$GLB,,, | Performed by: INTERNAL MEDICINE

## 2024-02-27 PROCEDURE — 3074F SYST BP LT 130 MM HG: CPT | Mod: CPTII,S$GLB,, | Performed by: INTERNAL MEDICINE

## 2024-02-27 PROCEDURE — 1159F MED LIST DOCD IN RCRD: CPT | Mod: CPTII,S$GLB,, | Performed by: INTERNAL MEDICINE

## 2024-02-27 PROCEDURE — 99999 PR PBB SHADOW E&M-EST. PATIENT-LVL III: CPT | Mod: PBBFAC,,, | Performed by: INTERNAL MEDICINE

## 2024-02-27 PROCEDURE — 3078F DIAST BP <80 MM HG: CPT | Mod: CPTII,S$GLB,, | Performed by: INTERNAL MEDICINE

## 2024-02-27 PROCEDURE — 3008F BODY MASS INDEX DOCD: CPT | Mod: CPTII,S$GLB,, | Performed by: INTERNAL MEDICINE

## 2024-02-27 NOTE — LETTER
February 27, 2024    Tamara Aldana  [address] [place] [address]         [facility]  [address]  [facility] [address]  Phone: [phone]  Fax: [phone] February 27, 2024     Patient: Tamara Aldana   YOB: 1990   Date of Visit: 2/27/2024       To Whom It May Concern:    It is my medical opinion that Tamara Aldana  has shingles.  For this reason she should be contagious until shingles lesions scab over.  Please take this into consideration .    If you have any questions or concerns, please don't hesitate to contact me.    Sincerely,           Zamzam Hwang MD

## 2024-02-27 NOTE — PROGRESS NOTES
Patient ID: Tamara Aldana is a 33 y.o. female.    Chief Complaint: Rash      Assessment:       1. Herpes zoster without complication          Plan:         1. Herpes zoster without complication       Natural history, alarm symptoms and hygienic measures, contagious this issues reviewed  Work note given  Valtrex 1 g 3 times daily for 1 week- hydration, medication cautions reviewed  I symptoms discussed, she is currently having none, follow-up poor results  Patient counseled for over 50% of the 25 minute appt, all questions answered,  chart reviewed and care coordinated.    Subjective:    appt    History of a cold sore but always on her R cheek.  Has had this in the past- upwards of 10 years ago.  Never has had fever blister on mouth.    Lots of stress lately.    She had an E-visit yesterday and her PCP gave her Valtrex, started taking it yesterday.  She took 2 g as directed.    No additional new lesions.  She does not have anything on the forehead nor at the tip of her nose.  Long discussion about alarm symptoms with regard to shingles, contagiousness, hygienic measures and natural history.  She has a slight headache and some diffuse lymphadenopathy but otherwise feels well.  No confusion or fever.  No nuchal rigidity.    Patient Active Problem List:     Attention-deficit hyperactivity disorder, unspecified type     S/P  section     Depression     Severe preeclampsia     Anxiety             Review of Systems   Constitutional:         Some fatigue and situational stress, has a 2-year-old boy   Skin:  Positive for rash.        See HPI         Objective:      Physical Exam  Constitutional:       Appearance: She is well-developed.   HENT:      Head: Normocephalic and atraumatic.   Eyes:      Extraocular Movements: Extraocular movements intact.      Conjunctiva/sclera: Conjunctivae normal.   Neck:      Thyroid: No thyromegaly.   Pulmonary:      Effort: No respiratory distress.   Skin:     Comments: Grouped  herpetic lesions right cheek, with erythema but no cellulitis  No lesions on forehead or nose  Conjunctiva within normal limit   Neurological:      General: No focal deficit present.      Mental Status: She is alert and oriented to person, place, and time.   Psychiatric:         Mood and Affect: Mood normal.         Behavior: Behavior normal.         Thought Content: Thought content normal.         Judgment: Judgment normal.             Health Maintenance Due   Topic Date Due    COVID-19 Vaccine (4 - 2023-24 season) 09/01/2023

## 2024-03-22 ENCOUNTER — OFFICE VISIT (OUTPATIENT)
Dept: DERMATOLOGY | Facility: CLINIC | Age: 34
End: 2024-03-22
Payer: COMMERCIAL

## 2024-03-22 VITALS — WEIGHT: 145 LBS | BODY MASS INDEX: 27.4 KG/M2

## 2024-03-22 DIAGNOSIS — D18.01 CHERRY ANGIOMA: ICD-10-CM

## 2024-03-22 DIAGNOSIS — Z87.898 HISTORY OF ATYPICAL NEVUS: ICD-10-CM

## 2024-03-22 DIAGNOSIS — D48.5 NEOPLASM OF UNCERTAIN BEHAVIOR OF SKIN: Primary | ICD-10-CM

## 2024-03-22 DIAGNOSIS — D22.9 NEVUS OF MULTIPLE SITES: ICD-10-CM

## 2024-03-22 PROCEDURE — 3008F BODY MASS INDEX DOCD: CPT | Mod: CPTII,S$GLB,, | Performed by: DERMATOLOGY

## 2024-03-22 PROCEDURE — 99214 OFFICE O/P EST MOD 30 MIN: CPT | Mod: 25,S$GLB,, | Performed by: DERMATOLOGY

## 2024-03-22 PROCEDURE — 1160F RVW MEDS BY RX/DR IN RCRD: CPT | Mod: CPTII,S$GLB,, | Performed by: DERMATOLOGY

## 2024-03-22 PROCEDURE — 1159F MED LIST DOCD IN RCRD: CPT | Mod: CPTII,S$GLB,, | Performed by: DERMATOLOGY

## 2024-03-22 PROCEDURE — 99999 PR PBB SHADOW E&M-EST. PATIENT-LVL III: CPT | Mod: PBBFAC,,, | Performed by: DERMATOLOGY

## 2024-03-22 PROCEDURE — 88305 TISSUE EXAM BY PATHOLOGIST: CPT | Mod: 26,,, | Performed by: DERMATOLOGY

## 2024-03-22 PROCEDURE — 11102 TANGNTL BX SKIN SINGLE LES: CPT | Mod: S$GLB,,, | Performed by: DERMATOLOGY

## 2024-03-22 PROCEDURE — 88305 TISSUE EXAM BY PATHOLOGIST: CPT | Performed by: DERMATOLOGY

## 2024-03-22 NOTE — PROGRESS NOTES
Subjective:      Patient ID:  Tamara Aldana is a 33 y.o. female who presents for   Chief Complaint   Patient presents with    Follow-up     Would like skin check, noticed new red spot on left scalp.     Follow-up - Follow-up  Affected locations: face, left arm, right arm, chest, torso, back and abdomen  Signs / symptoms: asymptomatic      Review of Systems   Constitutional:  Negative for fever, chills, weight loss, weight gain, fatigue, night sweats and malaise.   Skin:  Positive for daily sunscreen use and activity-related sunscreen use. Negative for wears hat.   Hematologic/Lymphatic: Does not bruise/bleed easily.       Objective:   Physical Exam   Constitutional: She appears well-developed and well-nourished. No distress.   Neurological: She is alert and oriented to person, place, and time. She is not disoriented.   Psychiatric: She has a normal mood and affect.   Skin:   Areas Examined (abnormalities noted in diagram):   Scalp / Hair Palpated and Inspected  Head / Face Inspection Performed  Neck Inspection Performed  Chest / Axilla Inspection Performed  Abdomen Inspection Performed  Back Inspection Performed  RUE Inspected  LUE Inspection Performed  RLE Inspected  LLE Inspection Performed  Nails and Digits Inspection Performed                 Diagram Legend     Erythematous scaling macule/papule c/w actinic keratosis       Vascular papule c/w angioma      Pigmented verrucoid papule/plaque c/w seborrheic keratosis      Yellow umbilicated papule c/w sebaceous hyperplasia      Irregularly shaped tan macule c/w lentigo     1-2 mm smooth white papules consistent with Milia      Movable subcutaneous cyst with punctum c/w epidermal inclusion cyst      Subcutaneous movable cyst c/w pilar cyst      Firm pink to brown papule c/w dermatofibroma      Pedunculated fleshy papule(s) c/w skin tag(s)      Evenly pigmented macule c/w junctional nevus     Mildly variegated pigmented, slightly irregular-bordered macule c/w  "mildly atypical nevus      Flesh colored to evenly pigmented papule c/w intradermal nevus       Pink pearly papule/plaque c/w basal cell carcinoma      Erythematous hyperkeratotic cursted plaque c/w SCC      Surgical scar with no sign of skin cancer recurrence      Open and closed comedones      Inflammatory papules and pustules      Verrucoid papule consistent consistent with wart     Erythematous eczematous patches and plaques     Dystrophic onycholytic nail with subungual debris c/w onychomycosis     Umbilicated papule    Erythematous-base heme-crusted tan verrucoid plaque consistent with inflamed seborrheic keratosis     Erythematous Silvery Scaling Plaque c/w Psoriasis     See annotation      Assessment / Plan:      Pathology Orders:       Normal Orders This Visit    Specimen to Pathology, Dermatology     Comments:    Edge of scar from previous atypical nevus removal    Questions:    Procedure Type: Dermatology and skin neoplasms    Number of Specimens: 1    ------------------------: -------------------------    Spec 1 Procedure: Biopsy    Spec 1 Clinical Impression: nevus    Spec 1 Source: right arm    Release to patient:           Neoplasm of uncertain behavior of skin  -     Specimen to Pathology, Dermatology  Shave biopsy performed after verbal consent including risk of infection, scar, recurrence, need for additional treatment of site. Area prepped with alcohol, anesthetized with 1% lidocaine with epinephrine. . Hemostasis achieved with cautery. No complications. Dressing applied. Wound care explained.         Nevus of multiple sites  The "ABCD" rules to observe pigmented lesions were reviewed.      Cherry angioma left scalp  This is a benign vascular lesion. Reassurance given. No treatment required.       History of atypical nevus  Comments:  moderate atypia right upper arm  Margins were clear             Follow up in about 1 year (around 3/22/2025).  "

## 2024-03-26 LAB
FINAL PATHOLOGIC DIAGNOSIS: NORMAL
GROSS: NORMAL
Lab: NORMAL
MICROSCOPIC EXAM: NORMAL

## 2024-03-27 ENCOUNTER — PATIENT MESSAGE (OUTPATIENT)
Dept: DERMATOLOGY | Facility: CLINIC | Age: 34
End: 2024-03-27
Payer: COMMERCIAL

## 2024-04-10 ENCOUNTER — CLINICAL SUPPORT (OUTPATIENT)
Dept: OBSTETRICS AND GYNECOLOGY | Facility: CLINIC | Age: 34
End: 2024-04-10
Payer: COMMERCIAL

## 2024-04-10 DIAGNOSIS — N91.2 AMENORRHEA: Primary | ICD-10-CM

## 2024-04-26 ENCOUNTER — PATIENT MESSAGE (OUTPATIENT)
Dept: OBSTETRICS AND GYNECOLOGY | Facility: CLINIC | Age: 34
End: 2024-04-26
Payer: COMMERCIAL

## 2024-04-26 ENCOUNTER — OCCUPATIONAL HEALTH (OUTPATIENT)
Dept: URGENT CARE | Facility: CLINIC | Age: 34
End: 2024-04-26

## 2024-04-26 ENCOUNTER — PATIENT MESSAGE (OUTPATIENT)
Dept: DERMATOLOGY | Facility: CLINIC | Age: 34
End: 2024-04-26
Payer: COMMERCIAL

## 2024-04-26 DIAGNOSIS — Z13.9 ENCOUNTER FOR SCREENING: Primary | ICD-10-CM

## 2024-04-26 PROCEDURE — 99002 DEVICE HANDLING PHYS/QHP: CPT | Mod: S$GLB,,, | Performed by: SURGERY

## 2024-04-26 PROCEDURE — 99080 SPECIAL REPORTS OR FORMS: CPT | Mod: S$GLB,,, | Performed by: SURGERY

## 2024-04-26 PROCEDURE — 94010 BREATHING CAPACITY TEST: CPT | Mod: S$GLB,,, | Performed by: SURGERY

## 2024-05-10 ENCOUNTER — OFFICE VISIT (OUTPATIENT)
Dept: OBSTETRICS AND GYNECOLOGY | Facility: CLINIC | Age: 34
End: 2024-05-10
Attending: OBSTETRICS & GYNECOLOGY
Payer: COMMERCIAL

## 2024-05-10 ENCOUNTER — HOSPITAL ENCOUNTER (OUTPATIENT)
Dept: PERINATAL CARE | Facility: OTHER | Age: 34
Discharge: HOME | End: 2024-05-10
Attending: OBSTETRICS & GYNECOLOGY
Payer: COMMERCIAL

## 2024-05-10 ENCOUNTER — TELEPHONE (OUTPATIENT)
Dept: OBSTETRICS AND GYNECOLOGY | Facility: CLINIC | Age: 34
End: 2024-05-10

## 2024-05-10 VITALS
HEIGHT: 61 IN | BODY MASS INDEX: 27.85 KG/M2 | WEIGHT: 147.5 LBS | SYSTOLIC BLOOD PRESSURE: 109 MMHG | DIASTOLIC BLOOD PRESSURE: 66 MMHG

## 2024-05-10 DIAGNOSIS — O09.299 HISTORY OF PRE-ECLAMPSIA IN PRIOR PREGNANCY, CURRENTLY PREGNANT: ICD-10-CM

## 2024-05-10 DIAGNOSIS — N91.2 AMENORRHEA: ICD-10-CM

## 2024-05-10 DIAGNOSIS — O34.219 PREVIOUS CESAREAN DELIVERY AFFECTING PREGNANCY, ANTEPARTUM: ICD-10-CM

## 2024-05-10 DIAGNOSIS — N91.4 SECONDARY OLIGOMENORRHEA: Primary | ICD-10-CM

## 2024-05-10 DIAGNOSIS — Q51.3 BICORNUATE UTERUS AFFECTING PREGNANCY, ANTEPARTUM: ICD-10-CM

## 2024-05-10 DIAGNOSIS — Z32.01 PREGNANCY TEST POSITIVE: ICD-10-CM

## 2024-05-10 DIAGNOSIS — O34.00 BICORNUATE UTERUS AFFECTING PREGNANCY, ANTEPARTUM: ICD-10-CM

## 2024-05-10 PROBLEM — Z87.59 HISTORY OF GESTATIONAL HYPERTENSION: Status: ACTIVE | Noted: 2024-05-10

## 2024-05-10 PROCEDURE — 99999 PR PBB SHADOW E&M-EST. PATIENT-LVL III: CPT | Mod: PBBFAC,,, | Performed by: OBSTETRICS & GYNECOLOGY

## 2024-05-10 PROCEDURE — 76801 OB US < 14 WKS SINGLE FETUS: CPT

## 2024-05-10 PROCEDURE — 76801 OB US < 14 WKS SINGLE FETUS: CPT | Mod: 26,,, | Performed by: OBSTETRICS & GYNECOLOGY

## 2024-05-10 PROCEDURE — 87088 URINE BACTERIA CULTURE: CPT | Performed by: OBSTETRICS & GYNECOLOGY

## 2024-05-10 PROCEDURE — 87591 N.GONORRHOEAE DNA AMP PROB: CPT | Performed by: OBSTETRICS & GYNECOLOGY

## 2024-05-10 PROCEDURE — 1159F MED LIST DOCD IN RCRD: CPT | Mod: CPTII,S$GLB,, | Performed by: OBSTETRICS & GYNECOLOGY

## 2024-05-10 PROCEDURE — 99214 OFFICE O/P EST MOD 30 MIN: CPT | Mod: S$GLB,,, | Performed by: OBSTETRICS & GYNECOLOGY

## 2024-05-10 PROCEDURE — 87086 URINE CULTURE/COLONY COUNT: CPT | Performed by: OBSTETRICS & GYNECOLOGY

## 2024-05-10 PROCEDURE — 3074F SYST BP LT 130 MM HG: CPT | Mod: CPTII,S$GLB,, | Performed by: OBSTETRICS & GYNECOLOGY

## 2024-05-10 PROCEDURE — 87106 FUNGI IDENTIFICATION YEAST: CPT | Performed by: OBSTETRICS & GYNECOLOGY

## 2024-05-10 PROCEDURE — 3008F BODY MASS INDEX DOCD: CPT | Mod: CPTII,S$GLB,, | Performed by: OBSTETRICS & GYNECOLOGY

## 2024-05-10 PROCEDURE — 1160F RVW MEDS BY RX/DR IN RCRD: CPT | Mod: CPTII,S$GLB,, | Performed by: OBSTETRICS & GYNECOLOGY

## 2024-05-10 PROCEDURE — 3078F DIAST BP <80 MM HG: CPT | Mod: CPTII,S$GLB,, | Performed by: OBSTETRICS & GYNECOLOGY

## 2024-05-11 LAB
BACTERIA UR CULT: ABNORMAL
C TRACH DNA SPEC QL NAA+PROBE: NOT DETECTED
N GONORRHOEA DNA SPEC QL NAA+PROBE: NOT DETECTED

## 2024-05-13 ENCOUNTER — TELEPHONE (OUTPATIENT)
Dept: OBSTETRICS AND GYNECOLOGY | Facility: CLINIC | Age: 34
End: 2024-05-13
Payer: COMMERCIAL

## 2024-05-13 NOTE — TELEPHONE ENCOUNTER
Called patient:    Discussed results of urine culture:    CANDIDA TROPICALIS   10,000 - 49,999 cfu/ml     Probable vaginal containment.  She is asymptomatic without any urinary or vaginal symptoms.

## 2024-05-22 ENCOUNTER — PATIENT MESSAGE (OUTPATIENT)
Dept: OBSTETRICS AND GYNECOLOGY | Facility: CLINIC | Age: 34
End: 2024-05-22
Payer: COMMERCIAL

## 2024-05-23 ENCOUNTER — PATIENT MESSAGE (OUTPATIENT)
Dept: DERMATOLOGY | Facility: CLINIC | Age: 34
End: 2024-05-23
Payer: COMMERCIAL

## 2024-05-31 ENCOUNTER — TELEPHONE (OUTPATIENT)
Dept: OBSTETRICS AND GYNECOLOGY | Facility: CLINIC | Age: 34
End: 2024-05-31
Payer: COMMERCIAL

## 2024-06-05 ENCOUNTER — INITIAL PRENATAL (OUTPATIENT)
Dept: OBSTETRICS AND GYNECOLOGY | Facility: CLINIC | Age: 34
End: 2024-06-05
Payer: COMMERCIAL

## 2024-06-05 ENCOUNTER — PATIENT MESSAGE (OUTPATIENT)
Dept: ADMINISTRATIVE | Facility: OTHER | Age: 34
End: 2024-06-05
Payer: COMMERCIAL

## 2024-06-05 VITALS
WEIGHT: 146.19 LBS | BODY MASS INDEX: 27.62 KG/M2 | SYSTOLIC BLOOD PRESSURE: 108 MMHG | DIASTOLIC BLOOD PRESSURE: 72 MMHG

## 2024-06-05 DIAGNOSIS — O34.219 PREVIOUS CESAREAN DELIVERY AFFECTING PREGNANCY, ANTEPARTUM: Primary | ICD-10-CM

## 2024-06-05 DIAGNOSIS — O09.299 HISTORY OF PRE-ECLAMPSIA IN PRIOR PREGNANCY, CURRENTLY PREGNANT: ICD-10-CM

## 2024-06-05 DIAGNOSIS — O34.00 BICORNUATE UTERUS AFFECTING PREGNANCY, ANTEPARTUM: ICD-10-CM

## 2024-06-05 DIAGNOSIS — Z3A.12 PREGNANCY WITH 12 COMPLETED WEEKS GESTATION: ICD-10-CM

## 2024-06-05 DIAGNOSIS — Q51.3 BICORNUATE UTERUS AFFECTING PREGNANCY, ANTEPARTUM: ICD-10-CM

## 2024-06-05 PROCEDURE — 99999 PR PBB SHADOW E&M-EST. PATIENT-LVL II: CPT | Mod: PBBFAC,,, | Performed by: OBSTETRICS & GYNECOLOGY

## 2024-06-05 PROCEDURE — 0500F INITIAL PRENATAL CARE VISIT: CPT | Mod: CPTII,S$GLB,, | Performed by: OBSTETRICS & GYNECOLOGY

## 2024-06-05 NOTE — PROGRESS NOTES
IOB visit at 12 weeks:  Reports fatigue and still some nausea.  No bleeding or cramping.  +FHTs with doppler.  Reviewed 8 week sono with EDC 5/10/24.  Reviewed IOB labs.  Discussed bicornuate uterus with MFM sono / consult 24.  Discussed history of pre-eclampsia, risk of recurrence, and recommendation for baby aspirin daily. Prior C/S x 1- discussed  vs repeat C/S- she strongly desires another C/S.  Discussed genetic testing options- desires [id number]- draw today.  Connected Mom ordered.  Return 4 weeks.

## 2024-06-06 ENCOUNTER — PATIENT MESSAGE (OUTPATIENT)
Dept: OBSTETRICS AND GYNECOLOGY | Facility: CLINIC | Age: 34
End: 2024-06-06
Payer: COMMERCIAL

## 2024-06-06 ENCOUNTER — OFFICE VISIT (OUTPATIENT)
Dept: URGENT CARE | Facility: CLINIC | Age: 34
End: 2024-06-06
Payer: COMMERCIAL

## 2024-06-06 VITALS
WEIGHT: 146 LBS | TEMPERATURE: 98 F | RESPIRATION RATE: 20 BRPM | OXYGEN SATURATION: 98 % | HEART RATE: 91 BPM | SYSTOLIC BLOOD PRESSURE: 126 MMHG | BODY MASS INDEX: 27.56 KG/M2 | HEIGHT: 61 IN | DIASTOLIC BLOOD PRESSURE: 82 MMHG

## 2024-06-06 DIAGNOSIS — R30.0 BURNING WITH URINATION: ICD-10-CM

## 2024-06-06 DIAGNOSIS — N30.01 ACUTE CYSTITIS WITH HEMATURIA: Primary | ICD-10-CM

## 2024-06-06 LAB
BILIRUB UR QL STRIP: NEGATIVE
GLUCOSE UR QL STRIP: NEGATIVE
KETONES UR QL STRIP: NEGATIVE
LEUKOCYTE ESTERASE UR QL STRIP: NEGATIVE
PH, POC UA: 6 (ref 5–8)
POC BLOOD, URINE: POSITIVE
POC NITRATES, URINE: NEGATIVE
PROT UR QL STRIP: NEGATIVE
SP GR UR STRIP: 1.02 (ref 1–1.03)
UROBILINOGEN UR STRIP-ACNC: NORMAL (ref 0.1–1.1)

## 2024-06-06 PROCEDURE — 87106 FUNGI IDENTIFICATION YEAST: CPT

## 2024-06-06 PROCEDURE — 81003 URINALYSIS AUTO W/O SCOPE: CPT | Mod: QW,S$GLB,,

## 2024-06-06 PROCEDURE — 87088 URINE BACTERIA CULTURE: CPT

## 2024-06-06 PROCEDURE — 99213 OFFICE O/P EST LOW 20 MIN: CPT | Mod: S$GLB,,,

## 2024-06-06 PROCEDURE — 87086 URINE CULTURE/COLONY COUNT: CPT

## 2024-06-06 RX ORDER — AMOXICILLIN AND CLAVULANATE POTASSIUM 875; 125 MG/1; MG/1
1 TABLET, FILM COATED ORAL EVERY 12 HOURS
Qty: 14 TABLET | Refills: 0 | Status: SHIPPED | OUTPATIENT
Start: 2024-06-06 | End: 2024-06-13

## 2024-06-07 NOTE — PROGRESS NOTES
"Subjective:      Patient ID: Tamara Aldana is a 34 y.o. female.    Vitals:  height is 5' 1" (1.549 m) and weight is 66.2 kg (146 lb). Her temperature is 98.4 °F (36.9 °C). Her blood pressure is 126/82 and her pulse is 91. Her respiration is 20 and oxygen saturation is 98%.     Chief Complaint: Dysuria    This is a 34 y.o. female who presents today with a chief complaint of burning urination that began today. She hasn't taken any medication to help relieve her symptoms. She is 12 weeks pregnant.     Dysuria   This is a new problem. The current episode started today. The problem has been gradually worsening. The quality of the pain is described as burning. The pain is at a severity of 4/10. The pain is mild. There has been no fever. She is Not sexually active. There is No history of pyelonephritis. Associated symptoms include frequency and urgency. Pertinent negatives include no behavior changes, chills, discharge, flank pain, hematuria, hesitancy, nausea, possible pregnancy, sweats, vomiting, weight loss, bubble bath use, constipation, rash or withholding. She has tried nothing for the symptoms.     Constitution: Negative for chills, fatigue and fever.   Gastrointestinal:  Negative for abdominal pain, nausea, vomiting, constipation and diarrhea.   Genitourinary:  Positive for dysuria, frequency and urgency. Negative for flank pain, hematuria, vaginal discharge and pelvic pain.   Musculoskeletal:  Negative for muscle ache.   Skin:  Negative for rash.      Objective:     Physical Exam   Constitutional: She is oriented to person, place, and time. She appears well-developed.   HENT:   Head: Normocephalic and atraumatic.   Ears:   Right Ear: External ear normal.   Left Ear: External ear normal.   Nose: Nose normal.   Mouth/Throat: Mucous membranes are normal.   Eyes: Conjunctivae and lids are normal.   Neck: Trachea normal. Neck supple.   Cardiovascular: Normal rate, regular rhythm and normal heart sounds. "   Pulmonary/Chest: Effort normal and breath sounds normal. No respiratory distress.   Abdominal: Normal appearance and bowel sounds are normal. She exhibits no distension and no mass. Soft. There is no abdominal tenderness. There is no guarding, no left CVA tenderness and no right CVA tenderness.   Musculoskeletal: Normal range of motion.         General: Normal range of motion.   Neurological: She is alert and oriented to person, place, and time. She has normal strength.   Skin: Skin is warm, dry, intact, not diaphoretic and not pale.   Psychiatric: Her speech is normal and behavior is normal. Judgment and thought content normal.   Nursing note and vitals reviewed.      Assessment:     1. Acute cystitis with hematuria    2. Burning with urination        Plan:     Results for orders placed or performed in visit on 06/06/24   POCT Urinalysis, Dipstick, Automated, W/O Scope   Result Value Ref Range    POC Blood, Urine Positive (A) Negative    POC Bilirubin, Urine Negative Negative    POC Urobilinogen, Urine Normal 0.1 - 1.1    POC Ketones, Urine Negative Negative    POC Protein, Urine Negative Negative    POC Nitrates, Urine Negative Negative    POC Glucose, Urine Negative Negative    pH, UA 6.0 5 - 8    POC Specific Gravity, Urine 1.025 1.003 - 1.029    POC Leukocytes, Urine Negative Negative       Acute cystitis with hematuria  -     amoxicillin-clavulanate 875-125mg (AUGMENTIN) 875-125 mg per tablet; Take 1 tablet by mouth every 12 (twelve) hours. for 7 days  Dispense: 14 tablet; Refill: 0  -     CULTURE, URINE    Burning with urination  -     POCT Urinalysis, Dipstick, Automated, W/O Scope            Discussed results/diagnosis/plan with patient in clinic. Strict precautions given to patient to monitor for worsening signs and symptoms. Advised to follow up with PCP or specialist.  Explained side effects of medications prescribed with patient and informed him/her to discontinue use if he/she has any side effects  and to inform UC or PCP if this occurs. All questions answered. Strict ED verses clinic return precautions stressed and given in depth. Advised if symptoms worsens of fail to improve he/she should go to the Emergency Room. Discharge and follow-up instructions given verbally/printed with the patient who expressed understanding and willingness to comply with my recommendations. Patient voiced understanding and in agreement with current treatment plan. Patient exits the exam room in no acute distress. Conversant and engaged during discharge discussion, verbalized understanding.

## 2024-06-07 NOTE — PATIENT INSTRUCTIONS
Please take full prescription of antibiotic until complete dose.  Take for the full 5-7 days or symptoms will not get better.  Take antibiotic with a meal and water to decrease GI upset. Always take a probiotic or eat daily yogurt while taking an antibiotic to maintain good gut and vaginal shonda.    Please drink plenty of fluids (water is best) within the next few days (2.7 liters or five 16 oz bottles) to flush out kidneys and bladder.  Avoid baths for the next week and only take showers.  Please practice good toileting hygiene and wipe front to back to avoid contamination to vaginal area.  Make sure to urinate after sexual intercourse to clear your urethra of bacteria.      Tylenol as needed for pain.     If your symptoms worsen or develop high fevers, worsening pain, please return to the clinic or follow-up with primary care provider.

## 2024-06-07 NOTE — TELEPHONE ENCOUNTER
Called patient:    Reports dysuria yesterday for which she was seen at Urgent Care and received Rx for antibiotics.    Today, feels much better.    Urine culture pending.    REC: Continue Abx until urine culture returns - if negative can discontinue abx.

## 2024-06-08 LAB — BACTERIA UR CULT: ABNORMAL

## 2024-06-10 ENCOUNTER — TELEPHONE (OUTPATIENT)
Dept: OBSTETRICS AND GYNECOLOGY | Facility: CLINIC | Age: 34
End: 2024-06-10
Payer: COMMERCIAL

## 2024-06-10 ENCOUNTER — TELEPHONE (OUTPATIENT)
Dept: URGENT CARE | Facility: CLINIC | Age: 34
End: 2024-06-10
Payer: COMMERCIAL

## 2024-06-10 NOTE — TELEPHONE ENCOUNTER
Pt asymptomatic no vaginal discharge.  Results given. Possible it is a contaminent.  Will call back if symptoms reoccur

## 2024-06-10 NOTE — TELEPHONE ENCOUNTER
Dr Shirley is out of the office this afternoon. I will leave the message for him to review tomorrow to see what he would like to send in for you whether it be oral or vaginal. Cheli  ===View-only below this line===      ----- Message -----       From:Tamara Aldana       Sent:6/10/2024  1:27 PM CDT         To:Patient Medical Advice Request Message List    Subject:Urgent care apt    I just saw my culture results-maybe I should do a round of miconazole 7? Let me know what you think about that candida coming up again. Should I be concerned about it being in my urine again?       ----- Message -----       From:Tamara Aldana       Sent:6/6/2024  8:12 PM CDT         To:Itz Shirley    Subject:Urgent care apt    Good evening Dr. Shirley- this morning I woke up with some burning when I peed. It got a little worse through the day and was very uncomfortable. I was concerned for maybe a UTI. I went to urgent care to make sure if it was a uti or not- my urine test didnt show uti but the provider is concerned that it could be incorrect so they put me on augmentin while I am waiting for a urine culture to result. I want to make sure I keep you in the loop- please let me know if youd like me to do anything different or make appointment. Thank you -

## 2024-06-19 ENCOUNTER — ROUTINE PRENATAL (OUTPATIENT)
Dept: OBSTETRICS AND GYNECOLOGY | Facility: CLINIC | Age: 34
End: 2024-06-19
Payer: COMMERCIAL

## 2024-06-19 ENCOUNTER — PATIENT MESSAGE (OUTPATIENT)
Dept: OBSTETRICS AND GYNECOLOGY | Facility: CLINIC | Age: 34
End: 2024-06-19

## 2024-06-19 VITALS
SYSTOLIC BLOOD PRESSURE: 118 MMHG | DIASTOLIC BLOOD PRESSURE: 72 MMHG | BODY MASS INDEX: 27.74 KG/M2 | WEIGHT: 146.81 LBS

## 2024-06-19 DIAGNOSIS — O34.219 PREVIOUS CESAREAN DELIVERY AFFECTING PREGNANCY, ANTEPARTUM: Primary | ICD-10-CM

## 2024-06-19 DIAGNOSIS — N76.0 VULVOVAGINITIS: ICD-10-CM

## 2024-06-19 DIAGNOSIS — Z3A.14 PREGNANCY WITH 14 COMPLETED WEEKS GESTATION: ICD-10-CM

## 2024-06-19 PROCEDURE — 0502F SUBSEQUENT PRENATAL CARE: CPT | Mod: CPTII,S$GLB,, | Performed by: OBSTETRICS & GYNECOLOGY

## 2024-06-19 PROCEDURE — 99999 PR PBB SHADOW E&M-EST. PATIENT-LVL III: CPT | Mod: PBBFAC,,, | Performed by: OBSTETRICS & GYNECOLOGY

## 2024-06-19 PROCEDURE — 81514 NFCT DS BV&VAGINITIS DNA ALG: CPT | Performed by: OBSTETRICS & GYNECOLOGY

## 2024-06-19 NOTE — PROGRESS NOTES
14 weeks:  Nausea has resolved but still with fatigue.  No bleeding or cramping.  +FHTs with doppler.  Previously, noted external burning with urination which has now completely resolved.  Urine culture returned with yeast, consistent with vaginal contaminate.  Denies abnormal vaginal discharge or itching.  Speculum exam: mild amount of white discharge- Affirm performed.  Cx visually closed / thick.  Urine dip today with trace protein, otherwise unremarkable.  [facility] sono 7/1/24.  Return 4 weeks.        Answers submitted by the patient for this visit:  Urinary Symptoms Questionnaire  (Submitted on 6/19/2024)  Chief Complaint: Urinary tract infection  Chronicity: new  Onset: in the past 7 days  Frequency: intermittently  Progression since onset: gradually improving  Pain quality: burning  Pain - numeric: 3/10  Pain severity: mild  Fever: no fever  Sexually active?: Yes  History of pyelonephritis?: No  chills: No  discharge: No  flank pain: No  frequency: Yes  hematuria: No  hesitancy: No  nausea: No  possible pregnancy: No  sweats: No  urgency: No  vomiting: No  constipation: No  rash: No  weight loss: No  withholding: No  behavior changes: No  Treatments tried: home medications  Improvement on treatment: moderate  catheterization: No  diabetes insipidus: No  diabetes mellitus: No  genitourinary reflux: No  hypertension: No  recurrent UTIs: No  single kidney: No  STD: No  urinary stasis: No  urological procedure: No  kidney stones: No

## 2024-06-20 LAB
BACTERIAL VAGINOSIS DNA: NEGATIVE
CANDIDA GLABRATA DNA: NEGATIVE
CANDIDA KRUSEI DNA: NEGATIVE
CANDIDA RRNA VAG QL PROBE: NEGATIVE
T VAGINALIS RRNA GENITAL QL PROBE: NEGATIVE

## 2024-06-21 ENCOUNTER — PATIENT MESSAGE (OUTPATIENT)
Dept: OBSTETRICS AND GYNECOLOGY | Facility: CLINIC | Age: 34
End: 2024-06-21
Payer: COMMERCIAL

## 2024-06-29 ENCOUNTER — PATIENT MESSAGE (OUTPATIENT)
Dept: OBSTETRICS AND GYNECOLOGY | Facility: CLINIC | Age: 34
End: 2024-06-29
Payer: COMMERCIAL

## 2024-07-01 ENCOUNTER — OFFICE VISIT (OUTPATIENT)
Dept: MATERNAL FETAL MEDICINE | Facility: CLINIC | Age: 34
End: 2024-07-01
Payer: COMMERCIAL

## 2024-07-01 ENCOUNTER — PATIENT MESSAGE (OUTPATIENT)
Dept: OTHER | Facility: OTHER | Age: 34
End: 2024-07-01
Payer: COMMERCIAL

## 2024-07-01 ENCOUNTER — PROCEDURE VISIT (OUTPATIENT)
Dept: MATERNAL FETAL MEDICINE | Facility: CLINIC | Age: 34
End: 2024-07-01
Attending: OBSTETRICS & GYNECOLOGY
Payer: COMMERCIAL

## 2024-07-01 VITALS
DIASTOLIC BLOOD PRESSURE: 83 MMHG | HEIGHT: 61 IN | SYSTOLIC BLOOD PRESSURE: 125 MMHG | WEIGHT: 147.69 LBS | BODY MASS INDEX: 27.88 KG/M2

## 2024-07-01 DIAGNOSIS — O34.00 BICORNUATE UTERUS AFFECTING PREGNANCY, ANTEPARTUM: Primary | ICD-10-CM

## 2024-07-01 DIAGNOSIS — O34.00 BICORNUATE UTERUS AFFECTING PREGNANCY, ANTEPARTUM: ICD-10-CM

## 2024-07-01 DIAGNOSIS — Q51.3 BICORNUATE UTERUS AFFECTING PREGNANCY, ANTEPARTUM: Primary | ICD-10-CM

## 2024-07-01 DIAGNOSIS — Q51.3 BICORNUATE UTERUS AFFECTING PREGNANCY, ANTEPARTUM: ICD-10-CM

## 2024-07-01 PROCEDURE — 99213 OFFICE O/P EST LOW 20 MIN: CPT | Mod: 25,S$GLB,, | Performed by: OBSTETRICS & GYNECOLOGY

## 2024-07-01 PROCEDURE — 76805 OB US >/= 14 WKS SNGL FETUS: CPT | Mod: S$GLB,,, | Performed by: OBSTETRICS & GYNECOLOGY

## 2024-07-01 PROCEDURE — 99999 PR PBB SHADOW E&M-EST. PATIENT-LVL III: CPT | Mod: PBBFAC,,, | Performed by: OBSTETRICS & GYNECOLOGY

## 2024-07-01 PROCEDURE — 76817 TRANSVAGINAL US OBSTETRIC: CPT | Mod: S$GLB,,, | Performed by: OBSTETRICS & GYNECOLOGY

## 2024-07-01 PROCEDURE — 3079F DIAST BP 80-89 MM HG: CPT | Mod: CPTII,S$GLB,, | Performed by: OBSTETRICS & GYNECOLOGY

## 2024-07-01 PROCEDURE — 3008F BODY MASS INDEX DOCD: CPT | Mod: CPTII,S$GLB,, | Performed by: OBSTETRICS & GYNECOLOGY

## 2024-07-01 PROCEDURE — 3074F SYST BP LT 130 MM HG: CPT | Mod: CPTII,S$GLB,, | Performed by: OBSTETRICS & GYNECOLOGY

## 2024-07-01 RX ORDER — ASPIRIN 81 MG/1
81 TABLET ORAL DAILY
COMMUNITY

## 2024-07-01 NOTE — ASSESSMENT & PLAN NOTE
The patient has a known bicornuate uterus, diagnosed prior to today's ultrasound. Müllerian anomalies can put patients at higher risk for adverse pregnancy outcomes, including second-trimester losses, FGR, low birth weight, breech presentation, PTD,  delivery when compared to patients with normal uteri.  Unfortunately, there are no interventions that are well-studied or accepted to prevent these poor outcomes.  Recommendations:  - repeat cervical length assessment to be performed at the time of anatomy survey  - serial growth U/S in the 3rd trimester (28, 32, and 36 weeks)  - close monitoring for signs/symptoms of  labor  - if not previously performed, would recommend assessment for maternal renal abnormalities as there is a high incidence of  tract abnormalities in patients with Müllerian anomalies

## 2024-07-01 NOTE — PROGRESS NOTES
Maternal Fetal Medicine consult    SUBJECTIVE:     Tamara Aldana is a 34 y.o.  female with IUP at 16w0d who is seen in consultation by MFM for evaluation and management of:  Problem   Bicornuate Uterus Affecting Pregnancy, Antepartum       OB HX:  OB History    Para Term  AB Living   2 1 1 0 0 1   SAB IAB Ectopic Multiple Live Births   0 0 0 0 1      # Outcome Date GA Lbr Bharath/2nd Weight Sex Type Anes PTL Lv   2 Current            1 Term 21 37w2d  2.6 kg (5 lb 11.7 oz) M CS-LTranv Spinal, EPI N MICHAEL      Complications: Failure to Progress in Second Stage      Obstetric Comments   Menarche ~ 15       Past Medical History:   Diagnosis Date    ADD (attention deficit disorder)     Anxiety     History of panic attacks      Past Surgical History:   Procedure Laterality Date     SECTION N/A 2021    Procedure:  SECTION;  Surgeon: Julie R. Jeansonne, MD;  Location: [facility] L&D;  Service: OB/GYN;  Laterality: N/A;    COLONOSCOPY N/A 2023    Procedure: COLONOSCOPY;  Surgeon: Brayan Ledezma MD;  Location: [facility];  Service: Endoscopy;  Laterality: N/A;    ESOPHAGOGASTRODUODENOSCOPY N/A 2023    Procedure: EGD (ESOPHAGOGASTRODUODENOSCOPY);  Surgeon: Brayan Ledezma MD;  Location: [facility];  Service: Endoscopy;  Laterality: N/A;    LASIK Bilateral     TONSILLECTOMY      WISDOM TOOTH EXTRACTION       Family history: negative for birth defects, recurrent miscarriages, chromosomal abnormalities.   Social History     Tobacco Use    Smoking status: Never     Passive exposure: Never    Smokeless tobacco: Never   Substance Use Topics    Alcohol use: Not Currently    Drug use: No     Review of patient's allergies indicates:   Allergen Reactions    Bactrim [sulfamethoxazole-trimethoprim] Itching     Medications:  PNV  ASA    Aneuploidy screening:   NIPT low risk    Records reviewed    OBJECTIVE:     Blood Pressure: 125/83    Ultrasound  performed. See viewpoint for full ultrasound report.  [place] live IUP. Size is consistent with established gestational age.   Early anatomy survey appears normal of the structures visualized today.  Transvaginal cervical length measures normal at 47 mm.  The uterus appears to be bicornuate.    ASSESSMENT/PLAN:     34 y.o.  female with IUP at 16w0d    Bicornuate uterus affecting pregnancy, antepartum  The patient has a known bicornuate uterus, diagnosed prior to today's ultrasound. Müllerian anomalies can put patients at higher risk for adverse pregnancy outcomes, including second-trimester losses, FGR, low birth weight, breech presentation, PTD,  delivery when compared to patients with normal uteri.  Unfortunately, there are no interventions that are well-studied or accepted to prevent these poor outcomes.  Recommendations:  - repeat cervical length assessment to be performed at the time of anatomy survey  - serial growth U/S in the 3rd trimester (28, 32, and 36 weeks)  - close monitoring for signs/symptoms of  labor  - if not previously performed, would recommend assessment for maternal renal abnormalities as there is a high incidence of  tract abnormalities in patients with Müllerian anomalies     Anatomy survey/TVCL has been scheduled in 4 weeks  No MFM MD follow up has been scheduled.     Today I have spent 20 minutes in the care of the patient. This includes face to face time and non-face to face time preparing to see the patient (eg, review of tests), obtaining and/or reviewing separately obtained history, documenting clinical information in the electronic or other health record, independently interpreting results and communicating results to the patient/family/caregiver, or care coordination.     Joan Burden MD  Maternal Fetal Medicine

## 2024-07-03 ENCOUNTER — ROUTINE PRENATAL (OUTPATIENT)
Dept: OBSTETRICS AND GYNECOLOGY | Facility: CLINIC | Age: 34
End: 2024-07-03
Payer: COMMERCIAL

## 2024-07-03 VITALS
DIASTOLIC BLOOD PRESSURE: 74 MMHG | BODY MASS INDEX: 27.83 KG/M2 | SYSTOLIC BLOOD PRESSURE: 122 MMHG | WEIGHT: 147.25 LBS

## 2024-07-03 DIAGNOSIS — Z3A.16 PREGNANCY WITH 16 COMPLETED WEEKS GESTATION: ICD-10-CM

## 2024-07-03 DIAGNOSIS — O34.219 PREVIOUS CESAREAN DELIVERY AFFECTING PREGNANCY, ANTEPARTUM: Primary | ICD-10-CM

## 2024-07-03 DIAGNOSIS — O34.00 BICORNUATE UTERUS AFFECTING PREGNANCY, ANTEPARTUM: ICD-10-CM

## 2024-07-03 DIAGNOSIS — Q51.3 BICORNUATE UTERUS AFFECTING PREGNANCY, ANTEPARTUM: ICD-10-CM

## 2024-07-03 PROCEDURE — 0502F SUBSEQUENT PRENATAL CARE: CPT | Mod: CPTII,S$GLB,, | Performed by: OBSTETRICS & GYNECOLOGY

## 2024-07-03 PROCEDURE — 99999 PR PBB SHADOW E&M-EST. PATIENT-LVL II: CPT | Mod: PBBFAC,,, | Performed by: OBSTETRICS & GYNECOLOGY

## 2024-07-03 NOTE — PROGRESS NOTES
16 weeks:  Denies bleeding and cramping.  ?FM noted.  +FHTs with doppler.  BP and urine are normal.  Aware of negative [id number].  Reviewed M consult / ultrasound findings.  We reviewed management of bicornuate uterus during pregnancy.   Follow-up [facility] sono 7/29/24 to complete anatomic survey.   with URI- discussed.  Return 4 weeks.

## 2024-07-08 ENCOUNTER — PATIENT MESSAGE (OUTPATIENT)
Dept: OTHER | Facility: OTHER | Age: 34
End: 2024-07-08
Payer: COMMERCIAL

## 2024-07-24 ENCOUNTER — PATIENT MESSAGE (OUTPATIENT)
Dept: OBSTETRICS AND GYNECOLOGY | Facility: CLINIC | Age: 34
End: 2024-07-24
Payer: COMMERCIAL

## 2024-07-26 ENCOUNTER — PATIENT MESSAGE (OUTPATIENT)
Dept: PRIMARY CARE CLINIC | Facility: CLINIC | Age: 34
End: 2024-07-26
Payer: COMMERCIAL

## 2024-07-26 ENCOUNTER — PATIENT MESSAGE (OUTPATIENT)
Dept: OBSTETRICS AND GYNECOLOGY | Facility: CLINIC | Age: 34
End: 2024-07-26
Payer: COMMERCIAL

## 2024-07-29 ENCOUNTER — PATIENT MESSAGE (OUTPATIENT)
Dept: OTHER | Facility: OTHER | Age: 34
End: 2024-07-29
Payer: COMMERCIAL

## 2024-07-30 ENCOUNTER — TELEPHONE (OUTPATIENT)
Dept: OBSTETRICS AND GYNECOLOGY | Facility: CLINIC | Age: 34
End: 2024-07-30
Payer: COMMERCIAL

## 2024-07-30 ENCOUNTER — HOSPITAL ENCOUNTER (EMERGENCY)
Facility: OTHER | Age: 34
Discharge: HOME | End: 2024-07-30
Attending: STUDENT IN AN ORGANIZED HEALTH CARE EDUCATION/TRAINING PROGRAM
Payer: COMMERCIAL

## 2024-07-30 VITALS
OXYGEN SATURATION: 100 % | HEART RATE: 84 BPM | DIASTOLIC BLOOD PRESSURE: 58 MMHG | TEMPERATURE: 99 F | SYSTOLIC BLOOD PRESSURE: 107 MMHG | RESPIRATION RATE: 20 BRPM

## 2024-07-30 DIAGNOSIS — U07.1 COVID-19 VIRUS DETECTED: ICD-10-CM

## 2024-07-30 DIAGNOSIS — R06.02 SHORTNESS OF BREATH: ICD-10-CM

## 2024-07-30 DIAGNOSIS — U07.1 COVID-19: Primary | ICD-10-CM

## 2024-07-30 DIAGNOSIS — Z3A.20 20 WEEKS GESTATION OF PREGNANCY: ICD-10-CM

## 2024-07-30 LAB — SARS-COV-2 RDRP RESP QL NAA+PROBE: POSITIVE

## 2024-07-30 PROCEDURE — 25000003 PHARM REV CODE 250

## 2024-07-30 PROCEDURE — U0002 COVID-19 LAB TEST NON-CDC: HCPCS

## 2024-07-30 PROCEDURE — 99284 EMERGENCY DEPT VISIT MOD MDM: CPT

## 2024-07-30 RX ORDER — ACETAMINOPHEN 500 MG
1000 TABLET ORAL ONCE
Status: DISCONTINUED | OUTPATIENT
Start: 2024-07-30 | End: 2024-07-30 | Stop reason: HOSPADM

## 2024-07-30 RX ORDER — GUAIFENESIN 600 MG/1
600 TABLET, EXTENDED RELEASE ORAL ONCE
Status: COMPLETED | OUTPATIENT
Start: 2024-07-30 | End: 2024-07-30

## 2024-07-30 RX ADMIN — GUAIFENESIN 600 MG: 600 TABLET, EXTENDED RELEASE ORAL at 12:07

## 2024-07-30 NOTE — TELEPHONE ENCOUNTER
----- Message from Narcisa Walsh sent at 7/30/2024  1:18 PM CDT -----  Name of Who is calling :ESSIE MOREJON [[id number]]        What is the request in detail:Pt would like a call back to talk about her schedule.please assist         Can the clinic reply by [facility]:  no          What number to call back [facility] not [facility]:[phone]

## 2024-08-02 ENCOUNTER — PROCEDURE VISIT (OUTPATIENT)
Dept: MATERNAL FETAL MEDICINE | Facility: CLINIC | Age: 34
End: 2024-08-02
Attending: OBSTETRICS & GYNECOLOGY
Payer: COMMERCIAL

## 2024-08-02 DIAGNOSIS — Q51.3 BICORNUATE UTERUS AFFECTING PREGNANCY, ANTEPARTUM: ICD-10-CM

## 2024-08-02 DIAGNOSIS — O34.00 BICORNUATE UTERUS AFFECTING PREGNANCY, ANTEPARTUM: ICD-10-CM

## 2024-08-02 DIAGNOSIS — Z36.2 ENCOUNTER FOR FOLLOW-UP ULTRASOUND OF FETAL ANATOMY: Primary | ICD-10-CM

## 2024-08-02 PROCEDURE — 76805 OB US >/= 14 WKS SNGL FETUS: CPT | Mod: S$GLB,,, | Performed by: OBSTETRICS & GYNECOLOGY

## 2024-08-13 ENCOUNTER — ROUTINE PRENATAL (OUTPATIENT)
Dept: OBSTETRICS AND GYNECOLOGY | Facility: CLINIC | Age: 34
End: 2024-08-13
Attending: OBSTETRICS & GYNECOLOGY
Payer: COMMERCIAL

## 2024-08-13 VITALS
WEIGHT: 149.94 LBS | BODY MASS INDEX: 28.33 KG/M2 | SYSTOLIC BLOOD PRESSURE: 110 MMHG | DIASTOLIC BLOOD PRESSURE: 60 MMHG

## 2024-08-13 DIAGNOSIS — O34.219 PREVIOUS CESAREAN DELIVERY AFFECTING PREGNANCY, ANTEPARTUM: Primary | ICD-10-CM

## 2024-08-13 DIAGNOSIS — Z3A.22 PREGNANCY WITH 22 COMPLETED WEEKS GESTATION: ICD-10-CM

## 2024-08-13 PROCEDURE — 99999 PR PBB SHADOW E&M-EST. PATIENT-LVL III: CPT | Mod: PBBFAC,,, | Performed by: OBSTETRICS & GYNECOLOGY

## 2024-08-13 PROCEDURE — 0502F SUBSEQUENT PRENATAL CARE: CPT | Mod: CPTII,S$GLB,, | Performed by: OBSTETRICS & GYNECOLOGY

## 2024-08-13 RX ORDER — FLUOXETINE HYDROCHLORIDE 20 MG/1
20 CAPSULE ORAL
COMMUNITY
Start: 2024-07-01

## 2024-08-13 NOTE — PROGRESS NOTES
22 weeks:  Reports +FM.  Denies bleeding and cramping.  +FHTs with doppler.  Covid two weeks ago- feels fine now.  Reviewed 20 week [facility] sono with EFW 14%, follow-up sono 8/30/24 to complete anatomic survey.  DM screen, CBC, OB check in 4 weeks.

## 2024-08-26 ENCOUNTER — PATIENT MESSAGE (OUTPATIENT)
Dept: OTHER | Facility: OTHER | Age: 34
End: 2024-08-26
Payer: COMMERCIAL

## 2024-08-27 ENCOUNTER — PATIENT MESSAGE (OUTPATIENT)
Dept: PRIMARY CARE CLINIC | Facility: CLINIC | Age: 34
End: 2024-08-27
Payer: COMMERCIAL

## 2024-08-27 NOTE — TELEPHONE ENCOUNTER
Pt would like to know if she can get a definitive answer through testing as to whether or not the rash on her face is shingles    Had e-visit in Feb for same breakout

## 2024-08-30 ENCOUNTER — PROCEDURE VISIT (OUTPATIENT)
Dept: MATERNAL FETAL MEDICINE | Facility: CLINIC | Age: 34
End: 2024-08-30
Payer: COMMERCIAL

## 2024-08-30 DIAGNOSIS — O34.00 BICORNUATE UTERUS AFFECTING PREGNANCY, ANTEPARTUM: ICD-10-CM

## 2024-08-30 DIAGNOSIS — Z36.89 ENCOUNTER FOR ULTRASOUND TO CHECK FETAL GROWTH: Primary | ICD-10-CM

## 2024-08-30 DIAGNOSIS — Z36.2 ENCOUNTER FOR FOLLOW-UP ULTRASOUND OF FETAL ANATOMY: ICD-10-CM

## 2024-08-30 DIAGNOSIS — Q51.3 BICORNUATE UTERUS AFFECTING PREGNANCY, ANTEPARTUM: ICD-10-CM

## 2024-08-30 PROCEDURE — 76816 OB US FOLLOW-UP PER FETUS: CPT | Mod: S$GLB,,, | Performed by: OBSTETRICS & GYNECOLOGY

## 2024-09-05 ENCOUNTER — PATIENT MESSAGE (OUTPATIENT)
Dept: DERMATOLOGY | Facility: CLINIC | Age: 34
End: 2024-09-05
Payer: COMMERCIAL

## 2024-09-09 ENCOUNTER — PATIENT MESSAGE (OUTPATIENT)
Dept: OTHER | Facility: OTHER | Age: 34
End: 2024-09-09
Payer: COMMERCIAL

## 2024-09-09 ENCOUNTER — TELEPHONE (OUTPATIENT)
Dept: OBSTETRICS AND GYNECOLOGY | Facility: CLINIC | Age: 34
End: 2024-09-09
Payer: COMMERCIAL

## 2024-09-20 ENCOUNTER — LAB VISIT (OUTPATIENT)
Dept: LAB | Facility: OTHER | Age: 34
End: 2024-09-20
Attending: OBSTETRICS & GYNECOLOGY
Payer: COMMERCIAL

## 2024-09-20 ENCOUNTER — TELEPHONE (OUTPATIENT)
Dept: OBSTETRICS AND GYNECOLOGY | Facility: CLINIC | Age: 34
End: 2024-09-20

## 2024-09-20 ENCOUNTER — ROUTINE PRENATAL (OUTPATIENT)
Dept: OBSTETRICS AND GYNECOLOGY | Facility: CLINIC | Age: 34
End: 2024-09-20
Attending: OBSTETRICS & GYNECOLOGY
Payer: COMMERCIAL

## 2024-09-20 VITALS
DIASTOLIC BLOOD PRESSURE: 66 MMHG | WEIGHT: 152.31 LBS | SYSTOLIC BLOOD PRESSURE: 124 MMHG | BODY MASS INDEX: 28.78 KG/M2

## 2024-09-20 DIAGNOSIS — O34.219 PREVIOUS CESAREAN DELIVERY AFFECTING PREGNANCY, ANTEPARTUM: ICD-10-CM

## 2024-09-20 DIAGNOSIS — Z3A.22 PREGNANCY WITH 22 COMPLETED WEEKS GESTATION: ICD-10-CM

## 2024-09-20 DIAGNOSIS — O34.219 PREVIOUS CESAREAN DELIVERY AFFECTING PREGNANCY, ANTEPARTUM: Primary | ICD-10-CM

## 2024-09-20 DIAGNOSIS — N76.0 ACUTE VAGINITIS: ICD-10-CM

## 2024-09-20 DIAGNOSIS — Z3A.27 PREGNANCY WITH 27 COMPLETED WEEKS GESTATION: ICD-10-CM

## 2024-09-20 DIAGNOSIS — O99.810 ABNORMAL GLUCOSE AFFECTING PREGNANCY: Primary | ICD-10-CM

## 2024-09-20 LAB
BASOPHILS # BLD AUTO: 0.04 K/UL (ref 0–0.2)
BASOPHILS NFR BLD: 0.4 % (ref 0–1.9)
DIFFERENTIAL METHOD BLD: ABNORMAL
EOSINOPHIL # BLD AUTO: 0.2 K/UL (ref 0–0.5)
EOSINOPHIL NFR BLD: 2.1 % (ref 0–8)
ERYTHROCYTE [DISTWIDTH] IN BLOOD BY AUTOMATED COUNT: 13.4 % (ref 11.5–14.5)
GLUCOSE SERPL-MCNC: 172 MG/DL (ref 70–140)
HCT VFR BLD AUTO: 34.6 % (ref 37–48.5)
HGB BLD-MCNC: 11.7 G/DL (ref 12–16)
IMM GRANULOCYTES # BLD AUTO: 0.04 K/UL (ref 0–0.04)
IMM GRANULOCYTES NFR BLD AUTO: 0.4 % (ref 0–0.5)
LYMPHOCYTES # BLD AUTO: 1.4 K/UL (ref 1–4.8)
LYMPHOCYTES NFR BLD: 13.4 % (ref 18–48)
MCH RBC QN AUTO: 32.1 PG (ref 27–31)
MCHC RBC AUTO-ENTMCNC: 33.8 G/DL (ref 32–36)
MCV RBC AUTO: 95 FL (ref 82–98)
MONOCYTES # BLD AUTO: 0.4 K/UL (ref 0.3–1)
MONOCYTES NFR BLD: 3.8 % (ref 4–15)
NEUTROPHILS # BLD AUTO: 8.2 K/UL (ref 1.8–7.7)
NEUTROPHILS NFR BLD: 79.9 % (ref 38–73)
NRBC BLD-RTO: 0 /100 WBC
PLATELET # BLD AUTO: 274 K/UL (ref 150–450)
PMV BLD AUTO: 9.9 FL (ref 9.2–12.9)
RBC # BLD AUTO: 3.65 M/UL (ref 4–5.4)
WBC # BLD AUTO: 10.27 K/UL (ref 3.9–12.7)

## 2024-09-20 PROCEDURE — 85025 COMPLETE CBC W/AUTO DIFF WBC: CPT | Performed by: OBSTETRICS & GYNECOLOGY

## 2024-09-20 PROCEDURE — 99999 PR PBB SHADOW E&M-EST. PATIENT-LVL III: CPT | Mod: PBBFAC,,, | Performed by: OBSTETRICS & GYNECOLOGY

## 2024-09-20 PROCEDURE — 82950 GLUCOSE TEST: CPT | Performed by: OBSTETRICS & GYNECOLOGY

## 2024-09-20 PROCEDURE — 36415 COLL VENOUS BLD VENIPUNCTURE: CPT | Performed by: OBSTETRICS & GYNECOLOGY

## 2024-09-20 NOTE — TELEPHONE ENCOUNTER
Called patient:    Discussed results of DM screen: 172    Needs 3 hour GTT - she plans to go to [facility] lab 9/27/24    Discussed anemia: PNV and iron.

## 2024-09-20 NOTE — PROGRESS NOTES
27 weeks:  Reports good FM.  Denies bleeding and cramping.  Describes nasal congestion- discussed.  Several negative Covid tests.  Reports increased discharge, ?leakage of fluid.  Spec: moderate amount of white discharge- Affirm performed.  No pooling, Fern negative- no sign of ROM.  Cx closed / thick.  Notes several raised erythematous bumps on abdomen for the past several days, seem to be resolving- no itching.  MFM sono at 24 weeks with EFW 23%, AC 28%.  Follow-up [facility] sono 10/18/24.  DM screen, CBC today.  Return 3 weeks.

## 2024-09-23 ENCOUNTER — PATIENT MESSAGE (OUTPATIENT)
Dept: OTHER | Facility: OTHER | Age: 34
End: 2024-09-23
Payer: COMMERCIAL

## 2024-09-23 ENCOUNTER — TELEPHONE (OUTPATIENT)
Dept: OBSTETRICS AND GYNECOLOGY | Facility: CLINIC | Age: 34
End: 2024-09-23
Payer: COMMERCIAL

## 2024-09-23 RX ORDER — METRONIDAZOLE 500 MG/1
500 TABLET ORAL 2 TIMES DAILY
Qty: 10 TABLET | Refills: 0 | Status: SHIPPED | OUTPATIENT
Start: 2024-09-23 | End: 2024-09-28

## 2024-09-23 NOTE — TELEPHONE ENCOUNTER
Called patient:    Discussed results of Affirm:  BV and yeast    Rx   1) Flagyl 500 mg bid, no alcohol  2) Monistat

## 2024-09-24 ENCOUNTER — PATIENT MESSAGE (OUTPATIENT)
Dept: OBSTETRICS AND GYNECOLOGY | Facility: CLINIC | Age: 34
End: 2024-09-24
Payer: COMMERCIAL

## 2024-09-26 ENCOUNTER — PATIENT MESSAGE (OUTPATIENT)
Dept: OBSTETRICS AND GYNECOLOGY | Facility: CLINIC | Age: 34
End: 2024-09-26
Payer: COMMERCIAL

## 2024-09-27 ENCOUNTER — PATIENT MESSAGE (OUTPATIENT)
Dept: OBSTETRICS AND GYNECOLOGY | Facility: CLINIC | Age: 34
End: 2024-09-27

## 2024-09-27 ENCOUNTER — LAB VISIT (OUTPATIENT)
Dept: LAB | Facility: OTHER | Age: 34
End: 2024-09-27
Attending: OBSTETRICS & GYNECOLOGY
Payer: COMMERCIAL

## 2024-09-27 ENCOUNTER — ROUTINE PRENATAL (OUTPATIENT)
Dept: OBSTETRICS AND GYNECOLOGY | Facility: CLINIC | Age: 34
End: 2024-09-27
Attending: OBSTETRICS & GYNECOLOGY
Payer: COMMERCIAL

## 2024-09-27 VITALS
SYSTOLIC BLOOD PRESSURE: 108 MMHG | DIASTOLIC BLOOD PRESSURE: 62 MMHG | BODY MASS INDEX: 27.91 KG/M2 | WEIGHT: 147.69 LBS

## 2024-09-27 DIAGNOSIS — Z3A.28 PREGNANCY WITH 28 COMPLETED WEEKS GESTATION: ICD-10-CM

## 2024-09-27 DIAGNOSIS — O99.810 ABNORMAL GLUCOSE AFFECTING PREGNANCY: ICD-10-CM

## 2024-09-27 DIAGNOSIS — O34.219 PREVIOUS CESAREAN DELIVERY AFFECTING PREGNANCY, ANTEPARTUM: Primary | ICD-10-CM

## 2024-09-27 LAB
GLUCOSE SERPL-MCNC: 106 MG/DL
GLUCOSE SERPL-MCNC: 136 MG/DL
GLUCOSE SERPL-MCNC: 81 MG/DL (ref 70–110)
GLUCOSE SERPL-MCNC: 93 MG/DL

## 2024-09-27 PROCEDURE — 82951 GLUCOSE TOLERANCE TEST (GTT): CPT | Performed by: OBSTETRICS & GYNECOLOGY

## 2024-09-27 PROCEDURE — 99999 PR PBB SHADOW E&M-EST. PATIENT-LVL II: CPT | Mod: PBBFAC,,, | Performed by: OBSTETRICS & GYNECOLOGY

## 2024-09-27 PROCEDURE — 36415 COLL VENOUS BLD VENIPUNCTURE: CPT | Performed by: OBSTETRICS & GYNECOLOGY

## 2024-09-27 NOTE — PROGRESS NOTES
28 weeks:  Added today for BP evaluation.  Connected Mom BP yesterday: 145/90, 120/83.  Reports that she was not feeling well when she took BP.  Today, she is feeling fine.  BP in the office 108/62, no proteinuria.  No headache or blurred vision.  3 hour GTT today.  Return 10/10/24.

## 2024-09-30 ENCOUNTER — PATIENT MESSAGE (OUTPATIENT)
Dept: OBSTETRICS AND GYNECOLOGY | Facility: CLINIC | Age: 34
End: 2024-09-30
Payer: COMMERCIAL

## 2024-10-07 ENCOUNTER — PATIENT MESSAGE (OUTPATIENT)
Dept: OTHER | Facility: OTHER | Age: 34
End: 2024-10-07
Payer: COMMERCIAL

## 2024-10-10 ENCOUNTER — ROUTINE PRENATAL (OUTPATIENT)
Dept: OBSTETRICS AND GYNECOLOGY | Facility: CLINIC | Age: 34
End: 2024-10-10
Attending: OBSTETRICS & GYNECOLOGY
Payer: COMMERCIAL

## 2024-10-10 VITALS
WEIGHT: 150.81 LBS | HEART RATE: 88 BPM | DIASTOLIC BLOOD PRESSURE: 66 MMHG | SYSTOLIC BLOOD PRESSURE: 118 MMHG | BODY MASS INDEX: 28.49 KG/M2

## 2024-10-10 DIAGNOSIS — Z3A.30 PREGNANCY WITH 30 COMPLETED WEEKS GESTATION: ICD-10-CM

## 2024-10-10 DIAGNOSIS — O34.219 PREVIOUS CESAREAN DELIVERY AFFECTING PREGNANCY, ANTEPARTUM: Primary | ICD-10-CM

## 2024-10-10 PROCEDURE — 0502F SUBSEQUENT PRENATAL CARE: CPT | Mod: CPTII,S$GLB,, | Performed by: OBSTETRICS & GYNECOLOGY

## 2024-10-10 PROCEDURE — 99999 PR PBB SHADOW E&M-EST. PATIENT-LVL IV: CPT | Mod: PBBFAC,,, | Performed by: OBSTETRICS & GYNECOLOGY

## 2024-10-10 NOTE — PROGRESS NOTES
30 weeks:  Reports good FM.  Denies bleeding and cramping.  Aware of normal 3 hour GTT.  PNV and iron.   Discussed Tdap, RSV, flu vaccines.  Discussed scheduling of repeat C/S- request submitted.  Follow-up [facility] snow 10/18/24.  Return 2 weeks.

## 2024-10-14 ENCOUNTER — IMMUNIZATION (OUTPATIENT)
Dept: INTERNAL MEDICINE | Facility: CLINIC | Age: 34
End: 2024-10-14
Payer: COMMERCIAL

## 2024-10-14 ENCOUNTER — PATIENT MESSAGE (OUTPATIENT)
Dept: OBSTETRICS AND GYNECOLOGY | Facility: CLINIC | Age: 34
End: 2024-10-14
Payer: COMMERCIAL

## 2024-10-14 DIAGNOSIS — Z23 NEED FOR VACCINATION: Primary | ICD-10-CM

## 2024-10-14 PROCEDURE — 90656 IIV3 VACC NO PRSV 0.5 ML IM: CPT | Mod: S$GLB,,, | Performed by: INTERNAL MEDICINE

## 2024-10-14 PROCEDURE — 90471 IMMUNIZATION ADMIN: CPT | Mod: S$GLB,,, | Performed by: INTERNAL MEDICINE

## 2024-10-18 ENCOUNTER — PROCEDURE VISIT (OUTPATIENT)
Dept: MATERNAL FETAL MEDICINE | Facility: CLINIC | Age: 34
End: 2024-10-18
Attending: OBSTETRICS & GYNECOLOGY
Payer: COMMERCIAL

## 2024-10-18 ENCOUNTER — ROUTINE PRENATAL (OUTPATIENT)
Dept: OBSTETRICS AND GYNECOLOGY | Facility: CLINIC | Age: 34
End: 2024-10-18
Attending: OBSTETRICS & GYNECOLOGY
Payer: COMMERCIAL

## 2024-10-18 VITALS
SYSTOLIC BLOOD PRESSURE: 110 MMHG | WEIGHT: 152.13 LBS | BODY MASS INDEX: 28.74 KG/M2 | DIASTOLIC BLOOD PRESSURE: 66 MMHG

## 2024-10-18 DIAGNOSIS — O34.219 PREVIOUS CESAREAN DELIVERY AFFECTING PREGNANCY, ANTEPARTUM: ICD-10-CM

## 2024-10-18 DIAGNOSIS — Z36.89 ENCOUNTER FOR ULTRASOUND TO CHECK FETAL GROWTH: ICD-10-CM

## 2024-10-18 DIAGNOSIS — O26.893 VAGINAL DISCHARGE DURING PREGNANCY IN THIRD TRIMESTER: Primary | ICD-10-CM

## 2024-10-18 DIAGNOSIS — Z3A.31 PREGNANCY WITH 31 COMPLETED WEEKS GESTATION: ICD-10-CM

## 2024-10-18 DIAGNOSIS — N89.8 VAGINAL DISCHARGE DURING PREGNANCY IN THIRD TRIMESTER: Primary | ICD-10-CM

## 2024-10-18 PROCEDURE — 0352U VAGINOSIS SCREEN BY DNA PROBE: CPT | Performed by: OBSTETRICS & GYNECOLOGY

## 2024-10-18 PROCEDURE — 99999 PR PBB SHADOW E&M-EST. PATIENT-LVL III: CPT | Mod: PBBFAC,,, | Performed by: OBSTETRICS & GYNECOLOGY

## 2024-10-18 PROCEDURE — 76816 OB US FOLLOW-UP PER FETUS: CPT | Mod: S$GLB,,, | Performed by: STUDENT IN AN ORGANIZED HEALTH CARE EDUCATION/TRAINING PROGRAM

## 2024-10-18 NOTE — PROGRESS NOTES
31 weeks:  Reports good FM.  Denies bleeding.  Feels occasional mild cramping.  Reports increased vaginal discharge.  Affirm performed one month ago showed yeast and BV, treated with Monistat and Flagyl.  Reports similar symptoms today.  Spec: moderately thick white discharge.  Affirm performed.  Cx visually closed / thick.   MFM sono today- report pending at the time of visit.  Received Tdap and flu vaccines.  Rx RSV provided.  Scheduled for repeat C/S 12/10/24 at 7am.  [facility] discussed.  Return 2 weeks.

## 2024-10-21 ENCOUNTER — PATIENT MESSAGE (OUTPATIENT)
Dept: OBSTETRICS AND GYNECOLOGY | Facility: CLINIC | Age: 34
End: 2024-10-21
Payer: COMMERCIAL

## 2024-10-21 ENCOUNTER — PATIENT MESSAGE (OUTPATIENT)
Dept: MATERNAL FETAL MEDICINE | Facility: CLINIC | Age: 34
End: 2024-10-21
Payer: COMMERCIAL

## 2024-10-21 ENCOUNTER — PATIENT MESSAGE (OUTPATIENT)
Dept: OTHER | Facility: OTHER | Age: 34
End: 2024-10-21
Payer: COMMERCIAL

## 2024-10-24 ENCOUNTER — ROUTINE PRENATAL (OUTPATIENT)
Dept: OBSTETRICS AND GYNECOLOGY | Facility: CLINIC | Age: 34
End: 2024-10-24
Attending: OBSTETRICS & GYNECOLOGY
Payer: COMMERCIAL

## 2024-10-24 VITALS
WEIGHT: 150.81 LBS | SYSTOLIC BLOOD PRESSURE: 112 MMHG | BODY MASS INDEX: 28.49 KG/M2 | DIASTOLIC BLOOD PRESSURE: 64 MMHG

## 2024-10-24 DIAGNOSIS — O34.219 PREVIOUS CESAREAN DELIVERY AFFECTING PREGNANCY, ANTEPARTUM: Primary | ICD-10-CM

## 2024-10-24 DIAGNOSIS — Z3A.32 PREGNANCY WITH 32 COMPLETED WEEKS GESTATION: ICD-10-CM

## 2024-10-24 PROCEDURE — 0502F SUBSEQUENT PRENATAL CARE: CPT | Mod: CPTII,S$GLB,, | Performed by: OBSTETRICS & GYNECOLOGY

## 2024-10-24 PROCEDURE — 99999 PR PBB SHADOW E&M-EST. PATIENT-LVL III: CPT | Mod: PBBFAC,,, | Performed by: OBSTETRICS & GYNECOLOGY

## 2024-10-25 NOTE — PROGRESS NOTES
32 weeks:  Reports good FM.  Denies bleeding and cramping.  BP normal: 112/64.  Reviewed 31 week MFM sono with EFW 15%, AC 18%.  Follow-up MFM sono 11/22/24.  Aware of recent negative Affirm test.  Discussed [facility] bid.  Return 2 weeks.

## 2024-11-01 ENCOUNTER — HOSPITAL ENCOUNTER (EMERGENCY)
Facility: OTHER | Age: 34
Discharge: HOME | End: 2024-11-01
Attending: OBSTETRICS & GYNECOLOGY
Payer: COMMERCIAL

## 2024-11-01 VITALS
RESPIRATION RATE: 20 BRPM | TEMPERATURE: 98 F | HEART RATE: 78 BPM | SYSTOLIC BLOOD PRESSURE: 91 MMHG | OXYGEN SATURATION: 99 % | DIASTOLIC BLOOD PRESSURE: 62 MMHG

## 2024-11-01 DIAGNOSIS — R53.1 WEAKNESS: ICD-10-CM

## 2024-11-01 DIAGNOSIS — R42 DIZZINESS: Primary | ICD-10-CM

## 2024-11-01 DIAGNOSIS — Z3A.33 33 WEEKS GESTATION OF PREGNANCY: ICD-10-CM

## 2024-11-01 LAB
ALBUMIN SERPL BCP-MCNC: 3 G/DL (ref 3.5–5.2)
ALP SERPL-CCNC: 94 U/L (ref 40–150)
ALT SERPL W/O P-5'-P-CCNC: 22 U/L (ref 10–44)
ANION GAP SERPL CALC-SCNC: 6 MMOL/L (ref 8–16)
AST SERPL-CCNC: 17 U/L (ref 10–40)
BASOPHILS # BLD AUTO: 0.02 K/UL (ref 0–0.2)
BASOPHILS NFR BLD: 0.2 % (ref 0–1.9)
BILIRUB SERPL-MCNC: 0.4 MG/DL (ref 0.1–1)
BILIRUBIN, POC UA: ABNORMAL
BILIRUBIN, POC UA: ABNORMAL
BILIRUBIN, POC UA: NEGATIVE
BLOOD, POC UA: ABNORMAL
BLOOD, POC UA: ABNORMAL
BLOOD, POC UA: NEGATIVE
BUN SERPL-MCNC: 9 MG/DL (ref 6–20)
CALCIUM SERPL-MCNC: 8.5 MG/DL (ref 8.7–10.5)
CHLORIDE SERPL-SCNC: 109 MMOL/L (ref 95–110)
CLARITY, UA: CLEAR
CO2 SERPL-SCNC: 21 MMOL/L (ref 23–29)
COLOR, UA: YELLOW
CREAT SERPL-MCNC: 0.6 MG/DL (ref 0.5–1.4)
DIFFERENTIAL METHOD BLD: ABNORMAL
EOSINOPHIL # BLD AUTO: 0.2 K/UL (ref 0–0.5)
EOSINOPHIL NFR BLD: 2 % (ref 0–8)
ERYTHROCYTE [DISTWIDTH] IN BLOOD BY AUTOMATED COUNT: 13.2 % (ref 11.5–14.5)
EST. GFR  (NO RACE VARIABLE): >60 ML/MIN/1.73 M^2
GLUCOSE SERPL-MCNC: 110 MG/DL (ref 70–110)
GLUCOSE, POC UA: ABNORMAL
GLUCOSE, POC UA: ABNORMAL
GLUCOSE, POC UA: NEGATIVE
HCT VFR BLD AUTO: 36.3 % (ref 37–48.5)
HGB BLD-MCNC: 12.5 G/DL (ref 12–16)
IMM GRANULOCYTES # BLD AUTO: 0.02 K/UL (ref 0–0.04)
IMM GRANULOCYTES NFR BLD AUTO: 0.2 % (ref 0–0.5)
KETONES, POC UA: ABNORMAL
KETONES, POC UA: ABNORMAL
KETONES, POC UA: NEGATIVE
LEUKOCYTE EST, POC UA: ABNORMAL
LEUKOCYTE EST, POC UA: ABNORMAL
LEUKOCYTE EST, POC UA: NEGATIVE
LYMPHOCYTES # BLD AUTO: 1.4 K/UL (ref 1–4.8)
LYMPHOCYTES NFR BLD: 17.1 % (ref 18–48)
MCH RBC QN AUTO: 32.1 PG (ref 27–31)
MCHC RBC AUTO-ENTMCNC: 34.4 G/DL (ref 32–36)
MCV RBC AUTO: 93 FL (ref 82–98)
MONOCYTES # BLD AUTO: 0.4 K/UL (ref 0.3–1)
MONOCYTES NFR BLD: 4.8 % (ref 4–15)
NEUTROPHILS # BLD AUTO: 6.1 K/UL (ref 1.8–7.7)
NEUTROPHILS NFR BLD: 75.7 % (ref 38–73)
NITRITE, POC UA: ABNORMAL
NITRITE, POC UA: ABNORMAL
NITRITE, POC UA: NEGATIVE
NRBC BLD-RTO: 0 /100 WBC
PH UR STRIP: 7 [PH] (ref 5–8)
PH UR STRIP: ABNORMAL [PH] (ref 5–8)
PH UR STRIP: ABNORMAL [PH] (ref 5–8)
PLATELET # BLD AUTO: 243 K/UL (ref 150–450)
PMV BLD AUTO: 10 FL (ref 9.2–12.9)
POCT GLUCOSE: 109 MG/DL (ref 70–110)
POTASSIUM SERPL-SCNC: 3.7 MMOL/L (ref 3.5–5.1)
PROT SERPL-MCNC: 6.1 G/DL (ref 6–8.4)
PROTEIN, POC UA: ABNORMAL
PROTEIN, POC UA: ABNORMAL
PROTEIN, POC UA: NEGATIVE
RBC # BLD AUTO: 3.89 M/UL (ref 4–5.4)
SODIUM SERPL-SCNC: 136 MMOL/L (ref 136–145)
SPECIFIC GRAVITY, POC UA: 1.01 (ref 1–1.03)
SPECIFIC GRAVITY, POC UA: ABNORMAL (ref 1–1.03)
SPECIFIC GRAVITY, POC UA: ABNORMAL (ref 1–1.03)
UROBILINOGEN, POC UA: 0.2 E.U./DL
UROBILINOGEN, POC UA: ABNORMAL
UROBILINOGEN, POC UA: ABNORMAL
WBC # BLD AUTO: 8.07 K/UL (ref 3.9–12.7)

## 2024-11-01 PROCEDURE — 99284 EMERGENCY DEPT VISIT MOD MDM: CPT | Mod: 25,,, | Performed by: OBSTETRICS & GYNECOLOGY

## 2024-11-01 PROCEDURE — 82962 GLUCOSE BLOOD TEST: CPT

## 2024-11-01 PROCEDURE — 59025 FETAL NON-STRESS TEST: CPT | Mod: 26,,, | Performed by: OBSTETRICS & GYNECOLOGY

## 2024-11-01 PROCEDURE — 99284 EMERGENCY DEPT VISIT MOD MDM: CPT | Mod: 25

## 2024-11-01 PROCEDURE — 93010 ELECTROCARDIOGRAM REPORT: CPT | Mod: ,,, | Performed by: INTERNAL MEDICINE

## 2024-11-01 PROCEDURE — 59025 FETAL NON-STRESS TEST: CPT

## 2024-11-01 PROCEDURE — 80053 COMPREHEN METABOLIC PANEL: CPT | Performed by: OBSTETRICS & GYNECOLOGY

## 2024-11-01 PROCEDURE — 93005 ELECTROCARDIOGRAM TRACING: CPT

## 2024-11-01 PROCEDURE — 85025 COMPLETE CBC W/AUTO DIFF WBC: CPT | Performed by: OBSTETRICS & GYNECOLOGY

## 2024-11-01 NOTE — DISCHARGE INSTRUCTIONS
Return if you experience contractions, uterine tightening or cramps(more than 4 in 1 hour for 2 consecutive hours)  Backache that comes and goes  Pelvic pressure  Change in vaginal discharge  Vaginal Bleeding  Leaking of fluid  Call the OB [phone]) during regular business hours or L&D([phone]) after hours for any questions or concerns  Keep previously scheduled clinic appointment  Drink 8-10 glasses of water a day

## 2024-11-01 NOTE — ED PROVIDER NOTES
Encounter Date: 2024       History     Chief Complaint   Patient presents with    Dizziness    Weakness     Tamara Aldana is a 34 y.o. [id number] at 33w4d presents complaining of generalized weakness and presyncope.     Patient reports feeling weak and lightheaded this AM when she arrived at work. Her vision became blurred and she felt presyncopal. She reports she has been shaky and weak with ambulation since. She denies any unilateral weakness/facial droop, numbness/tingling, syncope, seizure, difficulty word-finding. She reports that the only time she has felt presyncopal previously was with blood draws. She ate breakfast this morning and has been staying well hydrated. She otherwise feels well and denies any FC, NVD, dysuria, cough, congestion, HA, scotoma, SOB, CP, RUQ pain, edema. She reports her mood is good.     This IUP is complicated by h/o CS x1, h/o PreE, bicornuate uterus followed by MFM, anxiety/depression.  Patient denies contractions, denies vaginal bleeding, denies LOF.   Fetal Movement: normal         Review of patient's allergies indicates:   Allergen Reactions    Bactrim [sulfamethoxazole-trimethoprim] Itching     Past Medical History:   Diagnosis Date    ADD (attention deficit disorder)     Anxiety     History of panic attacks      Past Surgical History:   Procedure Laterality Date     SECTION N/A 2021    Procedure:  SECTION;  Surgeon: Julie R. Jeansonne, MD;  Location: [facility] L&D;  Service: OB/GYN;  Laterality: N/A;    COLONOSCOPY N/A 2023    Procedure: COLONOSCOPY;  Surgeon: Brayan Ledezma MD;  Location: [facility];  Service: Endoscopy;  Laterality: N/A;    ESOPHAGOGASTRODUODENOSCOPY N/A 2023    Procedure: EGD (ESOPHAGOGASTRODUODENOSCOPY);  Surgeon: Brayan Ledezma MD;  Location: [facility];  Service: Endoscopy;  Laterality: N/A;    LASIK Bilateral     TONSILLECTOMY      WISDOM TOOTH EXTRACTION       Family History    Problem Relation Name Age of Onset    Colon cancer Paternal Grandfather      Cancer Paternal Grandfather      Colon polyps Father      Hyperlipidemia Mother      Schizophrenia Mother      Breast cancer Neg Hx      Ovarian cancer Neg Hx      Melanoma Neg Hx      Uterine cancer Neg Hx      Cervical cancer Neg Hx       Social History     Tobacco Use    Smoking status: Never     Passive exposure: Never    Smokeless tobacco: Never   Substance Use Topics    Alcohol use: Not Currently    Drug use: No     Review of Systems   Constitutional:  Negative for chills and fever.   HENT:  Negative for sore throat.    Eyes:  Negative for visual disturbance.   Respiratory:  Negative for shortness of breath.    Cardiovascular:  Negative for chest pain and leg swelling.   Gastrointestinal:  Negative for abdominal pain, nausea and vomiting.   Genitourinary:  Negative for dysuria and vaginal bleeding.   Musculoskeletal:  Negative for back pain.   Skin:  Negative for rash.   Neurological:  Positive for light-headedness. Negative for seizures, syncope, speech difficulty, weakness, numbness and headaches.   Hematological:  Does not bruise/bleed easily.   Psychiatric/Behavioral: Negative.         Physical Exam     Initial Vitals   BP Pulse Resp Temp SpO2   11/01/24 0917 11/01/24 0916 11/01/24 0917 11/01/24 0917 11/01/24 0916   137/74 107 20 98.2 °F (36.8 °C) 100 %      MAP       --                Physical Exam    Vitals reviewed.  Constitutional: She appears well-developed and well-nourished. She is not diaphoretic. No distress.   HENT:   Head: Normocephalic and atraumatic.   Cardiovascular:  Regular rhythm and normal heart sounds.           tachycardic   Pulmonary/Chest: Breath sounds normal. No respiratory distress.   Abdominal: There is no abdominal tenderness.   Gravid habitus   Musculoskeletal:         General: No edema. Normal range of motion.     Neurological: She is alert and oriented to person, place, and time. She has normal  strength. She displays normal reflexes. No cranial nerve deficit or sensory deficit. GCS score is 15. GCS eye subscore is 4. GCS verbal subscore is 5. GCS motor subscore is 6.   Skin: Skin is warm and dry. Capillary refill takes less than 2 seconds.   Psychiatric: She has a normal mood and affect. Her behavior is normal. Judgment and thought content normal.         ED Course   Obtain Fetal nonstress test (NST)    Date/Time: 11/1/2024 10:02 AM    Performed by: Cate Velez MD  Authorized by: Cate Velez MD    Nonstress Test:     Variability:  6-25 BPM    Decelerations:  None    Accelerations:  15 bpm    Acoustic Stimulator: No      Baseline:  135    Uterine Irritability: Yes      Contractions:  Not present  Biophysical Profile:     Nonstress Test Interpretation: reactive      Overall Impression:  Reassuring  Post-procedure:     Patient tolerance:  Patient tolerated the procedure well with no immediate complications    Labs Reviewed   CBC W/ AUTO DIFFERENTIAL - Abnormal       Result Value    WBC 8.07      RBC 3.89 (*)     Hemoglobin 12.5      Hematocrit 36.3 (*)     MCV 93      MCH 32.1 (*)     MCHC 34.4      RDW 13.2      Platelets 243      MPV 10.0      Immature Granulocytes 0.2      Gran # (ANC) 6.1      Immature Grans (Abs) 0.02      Lymph # 1.4      Mono # 0.4      Eos # 0.2      Baso # 0.02      nRBC 0      Gran % 75.7 (*)     Lymph % 17.1 (*)     Mono % 4.8      Eosinophil % 2.0      Basophil % 0.2      Differential Method Automated     COMPREHENSIVE METABOLIC PANEL - Abnormal    Sodium 136      Potassium 3.7      Chloride 109      CO2 21 (*)     Glucose 110      BUN 9      Creatinine 0.6      Calcium 8.5 (*)     Total Protein 6.1      Albumin 3.0 (*)     Total Bilirubin 0.4      Alkaline Phosphatase 94      AST 17      ALT 22      eGFR >60      Anion Gap 6 (*)    POCT URINALYSIS W/O SCOPE - Abnormal    Spec Grav UA   (*)     PH, UA   (*)     Protein, UA   (*)     Glucose, UA   (*)     Ketones, UA    (*)     Bilirubin, UA   (*)     Blood, UA   (*)     Leukocytes, UA   (*)     Nitrite, UA   (*)     Urobilinogen, UA       POCT URINALYSIS W/O SCOPE - Abnormal    Spec Grav UA   (*)     PH, UA   (*)     Protein, UA   (*)     Glucose, UA   (*)     Ketones, UA   (*)     Bilirubin, UA   (*)     Blood, UA   (*)     Leukocytes, UA   (*)     Nitrite, UA   (*)     Urobilinogen, UA       POCT URINALYSIS W/O SCOPE    Spec Grav UA 1.015      PH, UA 7.0      Protein, UA Negative      Glucose, UA Negative      Ketones, UA Negative      Bilirubin, UA Negative      Blood, UA Negative      Leukocytes, UA Negative      Nitrite, UA Negative      Urobilinogen, UA 0.2      Color, UA POC Yellow      Clarity, UA, POC Clear     POCT URINALYSIS, DIPSTICK OR TABLET REAGENT, AUTOMATED, WITH MICROSCOP   POCT GLUCOSE    POCT Glucose 109     POCT GLUCOSE MONITORING CONTINUOUS     EKG Readings: (Independently Interpreted)   Rhythm: Normal Sinus Rhythm.       Imaging Results    None          Medications - No data to display  Medical Decision Making  34 y.o. [id number] at 33w4d presents complaining of generalized weakness and presyncope.     NST: R&R  Vincent: Irritability without CTX    Temp:  [98.2 °F (36.8 °C)] 98.2 °F (36.8 °C)  Pulse:  [] 78  Resp:  [20] 20  SpO2:  [96 %-100 %] 99 %  BP: ()/(62-74) 91/62     Presyncope/Weakness  - Patient reports episode of presyncope and weakness this AM   - No syncope, seizure  - Tachycardic to 109 otherwise VSS  - Neuro and cardiac exam WNL   - EKG NSR  - Udip WNL, neg ketones  - H/H 12.5/36.3  - CMP Ca 8.5 otherwise WNL  - PO hydration with water and electrolyte drink in IGOR  - Patient reports she is feeling better  - Patient stable for discharge at this time  - ED return precautions given  - She voiced understanding and is in agreement with the plan     Amount and/or Complexity of Data Reviewed  Labs: ordered.              Attending Attestation:   Physician Attestation Statement for  Resident:  As the supervising MD   Physician Attestation Statement: I have personally seen and examined this patient.   I agree with the above history.  -:   As the supervising MD I agree with the above PE.     As the supervising MD I agree with the above treatment, course, plan, and disposition.   -: I agree with the above edited resident note. Pt seen and examined, chart and labs reviewed.    Briefly, 33 yo  at 33w4d presenting for presyncope this AM. Afebrile, mild tachycardia to 100s otherwise unremarkable. Physical exam benign. Udip neg for dehydration. POCT glucose 109. EKG NSR. CBC with H&H 12.5/36.3, CMP unremarkable. Pt given PO hydration with improvement in symptoms. Precautions reviewed and PO hydration encouraged at home.     All questions answered. Stable for d/c home with outpatient follow up.     Sonia Jones MD  OB Hospitalist  2024     I was personally present during the critical portions of the procedure(s) performed by the resident and was immediately available in the ED to provide services and assistance as needed during the entire procedure.  I have reviewed and agree with the residents interpretation of the following: lab data.  I have reviewed the following: old records at this facility.                                       Clinical Impression:  Final diagnoses:  [R42] Dizziness (Primary)  [R53.1] Weakness  [Z3A.33] 33 weeks gestation of pregnancy            Cate Velez MD  Obstetrics & Gynecology, PGY-1      Cate Velez MD  Resident  24 [id number]       Sonia Jones MD  24 [phone]

## 2024-11-04 ENCOUNTER — PATIENT MESSAGE (OUTPATIENT)
Dept: OBSTETRICS AND GYNECOLOGY | Facility: CLINIC | Age: 34
End: 2024-11-04
Payer: COMMERCIAL

## 2024-11-05 DIAGNOSIS — Z36.89 ENCOUNTER FOR ULTRASOUND TO ASSESS FETAL GROWTH: Primary | ICD-10-CM

## 2024-11-07 ENCOUNTER — ROUTINE PRENATAL (OUTPATIENT)
Dept: OBSTETRICS AND GYNECOLOGY | Facility: CLINIC | Age: 34
End: 2024-11-07
Attending: OBSTETRICS & GYNECOLOGY
Payer: COMMERCIAL

## 2024-11-07 VITALS
SYSTOLIC BLOOD PRESSURE: 104 MMHG | WEIGHT: 152.31 LBS | DIASTOLIC BLOOD PRESSURE: 62 MMHG | BODY MASS INDEX: 28.78 KG/M2

## 2024-11-07 DIAGNOSIS — Z3A.34 PREGNANCY WITH 34 COMPLETED WEEKS GESTATION: ICD-10-CM

## 2024-11-07 DIAGNOSIS — O34.219 PREVIOUS CESAREAN DELIVERY AFFECTING PREGNANCY, ANTEPARTUM: Primary | ICD-10-CM

## 2024-11-07 LAB
OHS QRS DURATION: 74 MS
OHS QTC CALCULATION: 408 MS

## 2024-11-07 PROCEDURE — 99999 PR PBB SHADOW E&M-EST. PATIENT-LVL III: CPT | Mod: PBBFAC,,, | Performed by: OBSTETRICS & GYNECOLOGY

## 2024-11-07 PROCEDURE — 0502F SUBSEQUENT PRENATAL CARE: CPT | Mod: CPTII,S$GLB,, | Performed by: OBSTETRICS & GYNECOLOGY

## 2024-11-07 NOTE — PROGRESS NOTES
34 weeks:  Reports good FM.  Denies bleeding.  Feels occasional cramping.  Last week, she describes an episode of dizziness / lightheadedness at work for which she was evaluated in the IGOR with essentially negative work-up.  Hct 36.  She has not had any similar episodes subsequently.  Plans to have RSV vaccine- she has order.  Reports using Valtrex in the past for shingles, not hsv.  Return 2 weeks.  [facility] bid.

## 2024-11-11 ENCOUNTER — PATIENT MESSAGE (OUTPATIENT)
Dept: OTHER | Facility: OTHER | Age: 34
End: 2024-11-11
Payer: COMMERCIAL

## 2024-11-15 ENCOUNTER — PATIENT MESSAGE (OUTPATIENT)
Dept: OBSTETRICS AND GYNECOLOGY | Facility: CLINIC | Age: 34
End: 2024-11-15
Payer: COMMERCIAL

## 2024-11-20 ENCOUNTER — OFFICE VISIT (OUTPATIENT)
Dept: URGENT CARE | Facility: CLINIC | Age: 34
End: 2024-11-20
Payer: COMMERCIAL

## 2024-11-20 ENCOUNTER — HOSPITAL ENCOUNTER (EMERGENCY)
Facility: OTHER | Age: 34
Discharge: HOME | End: 2024-11-20
Attending: OBSTETRICS & GYNECOLOGY
Payer: COMMERCIAL

## 2024-11-20 ENCOUNTER — PATIENT MESSAGE (OUTPATIENT)
Dept: OBSTETRICS AND GYNECOLOGY | Facility: CLINIC | Age: 34
End: 2024-11-20
Payer: COMMERCIAL

## 2024-11-20 VITALS
DIASTOLIC BLOOD PRESSURE: 83 MMHG | BODY MASS INDEX: 28.7 KG/M2 | HEART RATE: 77 BPM | OXYGEN SATURATION: 98 % | WEIGHT: 152 LBS | RESPIRATION RATE: 17 BRPM | TEMPERATURE: 98 F | SYSTOLIC BLOOD PRESSURE: 122 MMHG | HEIGHT: 61 IN

## 2024-11-20 VITALS
DIASTOLIC BLOOD PRESSURE: 85 MMHG | OXYGEN SATURATION: 97 % | SYSTOLIC BLOOD PRESSURE: 126 MMHG | TEMPERATURE: 98 F | RESPIRATION RATE: 16 BRPM | HEART RATE: 79 BPM

## 2024-11-20 DIAGNOSIS — R30.0 DYSURIA: Primary | ICD-10-CM

## 2024-11-20 DIAGNOSIS — N39.0 URINARY TRACT INFECTION WITHOUT HEMATURIA, SITE UNSPECIFIED: ICD-10-CM

## 2024-11-20 DIAGNOSIS — O36.8130 DECREASED FETAL MOVEMENTS IN THIRD TRIMESTER, SINGLE OR UNSPECIFIED FETUS: ICD-10-CM

## 2024-11-20 DIAGNOSIS — Z3A.36 36 WEEKS GESTATION OF PREGNANCY: Primary | ICD-10-CM

## 2024-11-20 LAB
BILIRUBIN, UA POC OHS: NEGATIVE
BLOOD, UA POC OHS: NEGATIVE
CLARITY, UA POC OHS: CLEAR
COLOR, UA POC OHS: YELLOW
GLUCOSE, UA POC OHS: NEGATIVE
KETONES, UA POC OHS: NEGATIVE
LEUKOCYTES, UA POC OHS: NEGATIVE
NITRITE, UA POC OHS: NEGATIVE
PH, UA POC OHS: 7
PROTEIN, UA POC OHS: NEGATIVE
SPECIFIC GRAVITY, UA POC OHS: 1.02
UROBILINOGEN, UA POC OHS: 0.2

## 2024-11-20 PROCEDURE — 87086 URINE CULTURE/COLONY COUNT: CPT | Performed by: FAMILY MEDICINE

## 2024-11-20 PROCEDURE — 81003 URINALYSIS AUTO W/O SCOPE: CPT | Mod: QW,S$GLB,, | Performed by: FAMILY MEDICINE

## 2024-11-20 PROCEDURE — 59025 FETAL NON-STRESS TEST: CPT

## 2024-11-20 PROCEDURE — 59025 FETAL NON-STRESS TEST: CPT | Mod: 26,59,, | Performed by: OBSTETRICS & GYNECOLOGY

## 2024-11-20 PROCEDURE — 76815 OB US LIMITED FETUS(S): CPT | Mod: 26,,, | Performed by: OBSTETRICS & GYNECOLOGY

## 2024-11-20 PROCEDURE — 99213 OFFICE O/P EST LOW 20 MIN: CPT | Mod: S$GLB,,, | Performed by: FAMILY MEDICINE

## 2024-11-20 PROCEDURE — 99284 EMERGENCY DEPT VISIT MOD MDM: CPT | Mod: 25

## 2024-11-20 PROCEDURE — 99284 EMERGENCY DEPT VISIT MOD MDM: CPT | Mod: 25,,, | Performed by: OBSTETRICS & GYNECOLOGY

## 2024-11-20 RX ORDER — CEPHALEXIN 500 MG/1
500 CAPSULE ORAL EVERY 8 HOURS
Qty: 9 CAPSULE | Refills: 0 | Status: SHIPPED | OUTPATIENT
Start: 2024-11-20 | End: 2024-11-23

## 2024-11-20 NOTE — DISCHARGE INSTRUCTIONS
Please attend all remaining prenatal visits. Please call or visit your provider, the OB clinic, or the OB ED at [phone] if you have:    - Increased vaginal discharge  - Vaginal bleeding  - Contractions that are 2-5 minutes apart for two hours  - A blood pressure at or above 160/110  - Blurry or spots in your vision   - Upper right abdominal pain  - A headache unrelieved by medication  - Decreased fetal movement (less than 10 kicks in two hours).

## 2024-11-20 NOTE — ED PROVIDER NOTES
"Encounter Date: 2024       History     Chief Complaint   Patient presents with    Decreased Fetal Movement     Tamara Aldana is a 34 y.o.  at 36w2d who presents to the OB ED today (2024) with CC of decreased fetal movements.    She notes decreased movement for 1 day. She typically is awoken in the night from movements, however was not last night. Notes baby has been breech. She may have felt a movement that was like a "flip" but she is unsure.    She reports no vaginal bleeding, no leakage of fluid, and no contractions.     This pregnancy is complicated by bicornuate uterus.          Review of patient's allergies indicates:   Allergen Reactions    Bactrim [sulfamethoxazole-trimethoprim] Itching     Past Medical History:   Diagnosis Date    ADD (attention deficit disorder)     Anxiety     History of panic attacks      Past Surgical History:   Procedure Laterality Date     SECTION N/A 2021    Procedure:  SECTION;  Surgeon: Julie R. Jeansonne, MD;  Location: [facility] L&D;  Service: OB/GYN;  Laterality: N/A;    COLONOSCOPY N/A 2023    Procedure: COLONOSCOPY;  Surgeon: Brayan Ledezma MD;  Location: [facility];  Service: Endoscopy;  Laterality: N/A;    ESOPHAGOGASTRODUODENOSCOPY N/A 2023    Procedure: EGD (ESOPHAGOGASTRODUODENOSCOPY);  Surgeon: Brayan Ledezma MD;  Location: [facility];  Service: Endoscopy;  Laterality: N/A;    LASIK Bilateral 2012    TONSILLECTOMY  2014    WISDOM TOOTH EXTRACTION       Family History   Problem Relation Name Age of Onset    Colon cancer Paternal Grandfather      Cancer Paternal Grandfather      Colon polyps Father      Hyperlipidemia Mother      Schizophrenia Mother      Breast cancer Neg Hx      Ovarian cancer Neg Hx      Melanoma Neg Hx      Uterine cancer Neg Hx      Cervical cancer Neg Hx       Social History     Tobacco Use    Smoking status: Never     Passive exposure: Never    Smokeless tobacco: Never "   Substance Use Topics    Alcohol use: Not Currently    Drug use: No     Review of Systems   Constitutional:  Negative for fever.   HENT:  Negative for sore throat.    Respiratory:  Negative for shortness of breath.    Cardiovascular:  Negative for chest pain.   Gastrointestinal:  Negative for nausea.   Genitourinary:  Negative for dysuria.   Musculoskeletal:  Negative for back pain.   Skin:  Negative for rash.   Neurological:  Negative for weakness.   Hematological:  Does not bruise/bleed easily.       Physical Exam     Initial Vitals [24 0641]   BP Pulse Resp Temp SpO2   126/85 78 16 97.7 °F (36.5 °C) 100 %      MAP       --         Physical Exam    Vitals reviewed.  Constitutional: She appears well-developed and well-nourished.   HENT:   Head: Normocephalic and atraumatic.   Eyes: EOM are normal.   Neck:   Normal range of motion.  Cardiovascular:  Normal rate and intact distal pulses.           Pulmonary/Chest: No respiratory distress.   Abdominal: Abdomen is soft. There is no abdominal tenderness.   Musculoskeletal:         General: Normal range of motion.      Cervical back: Normal range of motion.     Neurological: She is oriented to person, place, and time.   Skin: Skin is warm and dry.   Psychiatric: She has a normal mood and affect.         ED Course   Obtain Fetal nonstress test (NST)    Date/Time: 2024 6:59 AM    Performed by: Carmen Angeles MD  Authorized by: Carmen Angeles MD    Nonstress Test:     Variability:  6-25 BPM    Decelerations:  None    Accelerations:  15 bpm    Baseline:  130    Contractions:  Irregular  Biophysical Profile:     Nonstress Test Interpretation: reactive      Overall Impression:  Reassuring    Labs Reviewed - No data to display       Imaging Results    None          Medications - No data to display  Medical Decision Making  Tamara Aldana is a 34 y.o.  at 36w2d who presents to the OB ED today (2024) with CC of decreased fetal  movements.    Temp:  [97.7 °F (36.5 °C)] 97.7 °F (36.5 °C)  Pulse:  [74-85] 79  Resp:  [16] 16  SpO2:  [97 %-100 %] 97 %  BP: (126)/(85) 126/85    Decreased Fetal Movement  - For 1 day, patient notes she did not get kick counts  - NST: 130 +accels, -decels  - [place] quiet  - US: cephalic and MVP adequate > patient previously breech  - Unsure if she felt a moment the baby flipped to cephalic  - Decreased movements likely due to change in position  - Patient reassured, strict ED precautions given   - Patient stable for discharge at this time  - She voiced understanding and is in agreement with the plan    Carmen Angeles MD  OBGYN   PGY-1                    Attending Attestation:   Physician Attestation Statement for Resident:  As the supervising MD   Physician Attestation Statement: I have personally seen and examined this patient.   I agree with the above history.  -:   As the supervising MD I agree with the above PE.     As the supervising MD I agree with the above treatment, course, plan, and disposition.   I was personally present during the critical portions of the procedure(s) performed by the resident and was immediately available in the ED to provide services and assistance as needed during the entire procedure.   I have reviewed the following: old records at this facility.                                       Clinical Impression:  Final diagnoses:  [Z3A.36] 36 weeks gestation of pregnancy (Primary)  [O36.8130] Decreased fetal movements in third trimester, single or unspecified fetus          ED Disposition Condition    Discharge Stable          ED Prescriptions    None       Follow-up Information    None          Carmen Angeles MD  Resident  11/21/24 [id number]       Rachael Pimentel MD  11/25/24 2703

## 2024-11-21 ENCOUNTER — PATIENT MESSAGE (OUTPATIENT)
Dept: OBSTETRICS AND GYNECOLOGY | Facility: CLINIC | Age: 34
End: 2024-11-21
Payer: COMMERCIAL

## 2024-11-21 NOTE — PROGRESS NOTES
"Subjective:      Patient ID: Tamara Aldana is a 34 y.o. female.    Vitals:  height is 5' 1" (1.549 m) and weight is 68.9 kg (152 lb). Her oral temperature is 98.1 °F (36.7 °C). Her blood pressure is 122/83 and her pulse is 77. Her respiration is 17 and oxygen saturation is 98%.     Chief Complaint: Dysuria (/)    This is a 34 y.o. female who presents today with a chief complaint of urgency, frequency, sx started yesterday.    Dysuria   This is a new problem. The current episode started acute onset. The problem occurs every urination. The problem has been unchanged. The quality of the pain is described as burning. The pain is at a severity of 4/10. There has been no fever. She is Not sexually active. There is No history of pyelonephritis. Pertinent negatives include no behavior changes, chills, discharge, flank pain, frequency, hematuria, hesitancy, nausea, possible pregnancy, bubble bath use, constipation or rash.     Constitution: Negative for chills.   Gastrointestinal:  Negative for nausea and constipation.   Genitourinary:  Positive for dysuria. Negative for frequency, flank pain and hematuria.   Skin:  Negative for rash and erythema.    Objective:     Physical Exam   Constitutional: She is oriented to person, place, and time. No distress. normal  HENT:   Head: Normocephalic and atraumatic.   Ears:   Right Ear: External ear normal.   Left Ear: External ear normal.   Nose: No rhinorrhea or congestion.   Eyes: Conjunctivae are normal. Pupils are equal, round, and reactive to light. Extraocular movement intact   Neck: Neck supple. No neck rigidity present.   Cardiovascular: Normal rate and regular rhythm.   No murmur heard.  Pulmonary/Chest: Effort normal and breath sounds normal. She has no wheezes.   Abdominal: Normal appearance and bowel sounds are normal. Soft. flat abdomen   Musculoskeletal: Normal range of motion.         General: Normal range of motion.   Neurological: no focal deficit. She is " alert, oriented to person, place, and time and at baseline.   Skin: Skin is warm and dry. Capillary refill takes less than 2 seconds. No erythema   Psychiatric: Her behavior is normal. Mood, judgment and thought content normal.   Nursing note and vitals reviewed.    Assessment:     Plan:   1. Dysuria  - POCT Urinalysis(Instrument)  - CULTURE, URINE    2. Urinary tract infection without hematuria, site unspecified  - cephALEXin (KEFLEX) 500 MG capsule; Take 1 capsule (500 mg total) by mouth every 8 (eight) hours. for 3 days  Dispense: 9 capsule; Refill: 0   Will contact pt with pending culture report when available.  Urinalysis report discussed already

## 2024-11-22 ENCOUNTER — HOSPITAL ENCOUNTER (EMERGENCY)
Facility: OTHER | Age: 34
Discharge: HOME | End: 2024-11-22
Attending: OBSTETRICS & GYNECOLOGY
Payer: COMMERCIAL

## 2024-11-22 ENCOUNTER — PROCEDURE VISIT (OUTPATIENT)
Dept: MATERNAL FETAL MEDICINE | Facility: CLINIC | Age: 34
End: 2024-11-22
Payer: COMMERCIAL

## 2024-11-22 ENCOUNTER — OFFICE VISIT (OUTPATIENT)
Dept: MATERNAL FETAL MEDICINE | Facility: CLINIC | Age: 34
End: 2024-11-22
Attending: OBSTETRICS & GYNECOLOGY
Payer: COMMERCIAL

## 2024-11-22 VITALS — SYSTOLIC BLOOD PRESSURE: 149 MMHG | HEART RATE: 119 BPM | DIASTOLIC BLOOD PRESSURE: 95 MMHG

## 2024-11-22 VITALS
SYSTOLIC BLOOD PRESSURE: 108 MMHG | TEMPERATURE: 98 F | HEART RATE: 80 BPM | DIASTOLIC BLOOD PRESSURE: 71 MMHG | RESPIRATION RATE: 20 BRPM | OXYGEN SATURATION: 97 %

## 2024-11-22 DIAGNOSIS — O34.03 BICORNUATE UTERUS AFFECTING PREGNANCY IN THIRD TRIMESTER, ANTEPARTUM: ICD-10-CM

## 2024-11-22 DIAGNOSIS — O16.3 ELEVATED BLOOD PRESSURE AFFECTING PREGNANCY IN THIRD TRIMESTER, ANTEPARTUM: ICD-10-CM

## 2024-11-22 DIAGNOSIS — Z36.89 ENCOUNTER FOR ULTRASOUND TO ASSESS FETAL GROWTH: ICD-10-CM

## 2024-11-22 DIAGNOSIS — O36.5990 PREGNANCY AFFECTED BY FETAL GROWTH RESTRICTION: Primary | ICD-10-CM

## 2024-11-22 DIAGNOSIS — Q51.3 BICORNUATE UTERUS AFFECTING PREGNANCY IN THIRD TRIMESTER, ANTEPARTUM: ICD-10-CM

## 2024-11-22 DIAGNOSIS — O16.3 ELEVATED BLOOD PRESSURE AFFECTING PREGNANCY IN THIRD TRIMESTER, ANTEPARTUM: Primary | ICD-10-CM

## 2024-11-22 DIAGNOSIS — Z3A.36 36 WEEKS GESTATION OF PREGNANCY: ICD-10-CM

## 2024-11-22 LAB
ALBUMIN SERPL BCP-MCNC: 3 G/DL (ref 3.5–5.2)
ALP SERPL-CCNC: 110 U/L (ref 40–150)
ALT SERPL W/O P-5'-P-CCNC: 12 U/L (ref 10–44)
ANION GAP SERPL CALC-SCNC: 11 MMOL/L (ref 8–16)
AST SERPL-CCNC: 15 U/L (ref 10–40)
BACTERIA UR CULT: NO GROWTH
BASOPHILS # BLD AUTO: 0.03 K/UL (ref 0–0.2)
BASOPHILS NFR BLD: 0.4 % (ref 0–1.9)
BILIRUB SERPL-MCNC: 0.3 MG/DL (ref 0.1–1)
BILIRUBIN, POC UA: NEGATIVE
BLOOD, POC UA: NEGATIVE
BUN SERPL-MCNC: 7 MG/DL (ref 6–20)
CALCIUM SERPL-MCNC: 8.9 MG/DL (ref 8.7–10.5)
CHLORIDE SERPL-SCNC: 110 MMOL/L (ref 95–110)
CLARITY, UA: CLEAR
CO2 SERPL-SCNC: 17 MMOL/L (ref 23–29)
COLOR, UA: YELLOW
CREAT SERPL-MCNC: 0.6 MG/DL (ref 0.5–1.4)
CREAT UR-MCNC: 22.6 MG/DL (ref 15–325)
DIFFERENTIAL METHOD BLD: ABNORMAL
EOSINOPHIL # BLD AUTO: 0.1 K/UL (ref 0–0.5)
EOSINOPHIL NFR BLD: 1.6 % (ref 0–8)
ERYTHROCYTE [DISTWIDTH] IN BLOOD BY AUTOMATED COUNT: 13.4 % (ref 11.5–14.5)
EST. GFR  (NO RACE VARIABLE): >60 ML/MIN/1.73 M^2
GLUCOSE SERPL-MCNC: 95 MG/DL (ref 70–110)
GLUCOSE, POC UA: NEGATIVE
HCT VFR BLD AUTO: 36.9 % (ref 37–48.5)
HGB BLD-MCNC: 12.8 G/DL (ref 12–16)
IMM GRANULOCYTES # BLD AUTO: 0.02 K/UL (ref 0–0.04)
IMM GRANULOCYTES NFR BLD AUTO: 0.2 % (ref 0–0.5)
KETONES, POC UA: NEGATIVE
LEUKOCYTE EST, POC UA: NEGATIVE
LYMPHOCYTES # BLD AUTO: 1.5 K/UL (ref 1–4.8)
LYMPHOCYTES NFR BLD: 17.6 % (ref 18–48)
MCH RBC QN AUTO: 32.2 PG (ref 27–31)
MCHC RBC AUTO-ENTMCNC: 34.7 G/DL (ref 32–36)
MCV RBC AUTO: 93 FL (ref 82–98)
MONOCYTES # BLD AUTO: 0.6 K/UL (ref 0.3–1)
MONOCYTES NFR BLD: 7.5 % (ref 4–15)
NEUTROPHILS # BLD AUTO: 6.1 K/UL (ref 1.8–7.7)
NEUTROPHILS NFR BLD: 72.7 % (ref 38–73)
NITRITE, POC UA: NEGATIVE
NRBC BLD-RTO: 0 /100 WBC
PH UR STRIP: 7 [PH] (ref 5–8)
PLATELET # BLD AUTO: 235 K/UL (ref 150–450)
PMV BLD AUTO: 10.6 FL (ref 9.2–12.9)
POTASSIUM SERPL-SCNC: 3.9 MMOL/L (ref 3.5–5.1)
PROT SERPL-MCNC: 6.2 G/DL (ref 6–8.4)
PROT UR-MCNC: <7 MG/DL (ref 0–15)
PROT/CREAT UR: NORMAL MG/G{CREAT} (ref 0–0.2)
PROTEIN, POC UA: NEGATIVE
RBC # BLD AUTO: 3.98 M/UL (ref 4–5.4)
SODIUM SERPL-SCNC: 138 MMOL/L (ref 136–145)
SPECIFIC GRAVITY, POC UA: 1.01 (ref 1–1.03)
UROBILINOGEN, POC UA: 0.2 E.U./DL
WBC # BLD AUTO: 8.36 K/UL (ref 3.9–12.7)

## 2024-11-22 PROCEDURE — 59025 FETAL NON-STRESS TEST: CPT

## 2024-11-22 PROCEDURE — 99999 PR PBB SHADOW E&M-EST. PATIENT-LVL I: CPT | Mod: PBBFAC,,, | Performed by: OBSTETRICS & GYNECOLOGY

## 2024-11-22 PROCEDURE — 80053 COMPREHEN METABOLIC PANEL: CPT | Performed by: OBSTETRICS & GYNECOLOGY

## 2024-11-22 PROCEDURE — 99284 EMERGENCY DEPT VISIT MOD MDM: CPT | Mod: 25

## 2024-11-22 PROCEDURE — 84156 ASSAY OF PROTEIN URINE: CPT | Performed by: OBSTETRICS & GYNECOLOGY

## 2024-11-22 PROCEDURE — 99284 EMERGENCY DEPT VISIT MOD MDM: CPT | Mod: 25,,, | Performed by: OBSTETRICS & GYNECOLOGY

## 2024-11-22 PROCEDURE — 59025 FETAL NON-STRESS TEST: CPT | Mod: 26,,, | Performed by: OBSTETRICS & GYNECOLOGY

## 2024-11-22 PROCEDURE — 85025 COMPLETE CBC W/AUTO DIFF WBC: CPT | Performed by: OBSTETRICS & GYNECOLOGY

## 2024-11-22 RX ORDER — METRONIDAZOLE 500 MG/1
500 TABLET ORAL EVERY 12 HOURS
Qty: 14 TABLET | Refills: 0 | Status: SHIPPED | OUTPATIENT
Start: 2024-11-22 | End: 2024-11-29

## 2024-11-22 NOTE — DISCHARGE INSTRUCTIONS
Keep previously scheduled Clinic appointment.     Return or call if you have any of the following symptoms: blurred vision, headache, vaginal bleeding, nausea/ vomiting, leaking of fluid, contractions that are 4-5 in one hour (before 36 weeks), decreased fetal movements ( 10 kicks in 2 hours), headache not relieved by Tylenol, or temp of 100.4 and greater.  Begin doing fetal kick counts, (at least 10 movements in 2 hours) starting at 28 weeks gestation. Term Labor: We want your contractions 5 min apart for 2 hours.    Any questions or concerns call Clinic or  desk for assistance.     For any questions or concerns call your Clinic [phone]  or [facility] Labor [facility] Delivery [phone].Keep previously scheduled Clinic appointment.

## 2024-11-22 NOTE — ED PROVIDER NOTES
Encounter Date: 2024       History     Chief Complaint   Patient presents with    Hypertension     Sent from [facility] clinic     Patient is a 34 y.o.  at 36 weeks presents to [facility] from [facility] for evaluation of an elevated BP reading.  No HA, vision changes, or RUQ pain. No contractions, SROM or VB.  Patient reports good FM.        Review of patient's allergies indicates:   Allergen Reactions    Bactrim [sulfamethoxazole-trimethoprim] Itching     Past Medical History:   Diagnosis Date    ADD (attention deficit disorder)     Anxiety     History of panic attacks      Past Surgical History:   Procedure Laterality Date     SECTION N/A 2021    Procedure:  SECTION;  Surgeon: Julie R. Jeansonne, MD;  Location: [facility] L&D;  Service: OB/GYN;  Laterality: N/A;    COLONOSCOPY N/A 2023    Procedure: COLONOSCOPY;  Surgeon: Brayan Ledezma MD;  Location: [facility];  Service: Endoscopy;  Laterality: N/A;    ESOPHAGOGASTRODUODENOSCOPY N/A 2023    Procedure: EGD (ESOPHAGOGASTRODUODENOSCOPY);  Surgeon: Brayan Ledezma MD;  Location: [facility];  Service: Endoscopy;  Laterality: N/A;    LASIK Bilateral 2012    TONSILLECTOMY  2014    WISDOM TOOTH EXTRACTION  2011     Family History   Problem Relation Name Age of Onset    Colon cancer Paternal Grandfather      Cancer Paternal Grandfather      Colon polyps Father      Hyperlipidemia Mother      Schizophrenia Mother      Breast cancer Neg Hx      Ovarian cancer Neg Hx      Melanoma Neg Hx      Uterine cancer Neg Hx      Cervical cancer Neg Hx       Social History     Tobacco Use    Smoking status: Never     Passive exposure: Never    Smokeless tobacco: Never   Substance Use Topics    Alcohol use: Not Currently    Drug use: No     Review of Systems   Constitutional:  Negative for chills and fever.   HENT:  Negative for congestion.    Eyes:  Negative for visual disturbance.   Respiratory:  Negative for chest tightness and shortness of  breath.    Cardiovascular:  Negative for chest pain, palpitations and leg swelling.   Gastrointestinal:  Positive for constipation. Negative for abdominal pain, diarrhea, nausea and vomiting.   Genitourinary:  Positive for vaginal discharge and vaginal pain. Negative for dysuria, genital sores and vaginal bleeding.   Neurological:  Negative for headaches.   Psychiatric/Behavioral:  Negative for dysphoric mood. The patient is not nervous/anxious.        Physical Exam     Initial Vitals   BP Pulse Resp Temp SpO2   11/22/24 1446 11/22/24 1446 11/22/24 1452 11/22/24 1452 11/22/24 1449   (!) 145/74 (!) 111 20 98.4 °F (36.9 °C) 98 %      MAP       --                Physical Exam    Vitals reviewed.  Constitutional: She appears well-developed and well-nourished. She is not diaphoretic. No distress.   HENT:   Head: Normocephalic and atraumatic.   Cardiovascular:  Normal rate.           Pulmonary/Chest: No respiratory distress.   Abdominal: There is no abdominal tenderness. There is no rebound and no guarding.   Genitourinary:    Vaginal discharge present.     Musculoskeletal:         General: No tenderness or edema.     Neurological: She is alert and oriented to person, place, and time.   Skin: Skin is warm and dry.   Psychiatric: She has a normal mood and affect. Thought content normal.     OB LABOR EXAM:   Pre-Term Labor: No.   Membranes ruptured: No.   Method: Sterile vaginal exam per MD.   Vaginal Bleeding: none present.     Dilatation: 0.   Station: -4.     Amniotic Fluid Color: no fluid.     Comments: Homogenous white discharge, no clumps, no erythema       ED Course   Obtain Fetal nonstress test (NST)    Date/Time: 11/22/2024 4:24 PM    Performed by: Gita Bonilla MD  Authorized by: Gita Bonilla MD    Nonstress Test:     Variability:  6-25 BPM    Decelerations:  None    Accelerations:  10 bpm    Baseline:  130    Contractions:  Regular    Contraction Frequency:  Every 5 min  Biophysical Profile:      Nonstress Test Interpretation: reactive      Overall Impression:  Reassuring    Labs Reviewed   COMPREHENSIVE METABOLIC PANEL - Abnormal       Result Value    Sodium 138      Potassium 3.9      Chloride 110      CO2 17 (*)     Glucose 95      BUN 7      Creatinine 0.6      Calcium 8.9      Total Protein 6.2      Albumin 3.0 (*)     Total Bilirubin 0.3      Alkaline Phosphatase 110      AST 15      ALT 12      eGFR >60      Anion Gap 11     CBC W/ AUTO DIFFERENTIAL - Abnormal    WBC 8.36      RBC 3.98 (*)     Hemoglobin 12.8      Hematocrit 36.9 (*)     MCV 93      MCH 32.2 (*)     MCHC 34.7      RDW 13.4      Platelets 235      MPV 10.6      Immature Granulocytes 0.2      Gran # (ANC) 6.1      Immature Grans (Abs) 0.02      Lymph # 1.5      Mono # 0.6      Eos # 0.1      Baso # 0.03      nRBC 0      Gran % 72.7      Lymph % 17.6 (*)     Mono % 7.5      Eosinophil % 1.6      Basophil % 0.4      Differential Method Automated     PROTEIN / CREATININE RATIO, URINE    Protein, Urine Random <7      Creatinine, Urine 22.6      Prot/Creat Ratio, Urine Unable to calculate      Narrative:     Specimen Source->Urine   POCT URINALYSIS W/O SCOPE    Spec Grav UA 1.015      PH, UA 7.0      Protein, UA Negative      Glucose, UA Negative      Ketones, UA Negative      Bilirubin, UA Negative      Blood, UA Negative      Leukocytes, UA Negative      Nitrite, UA Negative      Urobilinogen, UA 0.2      Color, UA POC Yellow      Clarity, UA, POC Clear            Imaging Results    None          Medications - No data to display  Medical Decision Making  Patient is a 34 y.o.  at 36 weeks presents to [facility] from [facility] for evaluation of an elevated BP reading.      Temp:  [98.4 °F (36.9 °C)] 98.4 °F (36.9 °C)  Pulse:  [] 88  Resp:  [20] 20  SpO2:  [96 %-99 %] 98 %  BP: (105-185)/(66-95) 105/66     Amount and/or Complexity of Data Reviewed  Labs: ordered. Decision-making details documented in ED Course.  Discussion of management or  test interpretation with external provider(s):    NST reactive and reassuring, toco with contractions every 5 min.  Exam not c/w labor.  Wet prep + clue cells.  Pre-E labs normal, no pre-E symptoms, BP normal in [facility].  Will discharge to home with Rx sent to pharmacy for flagyl.      Gita Bonilla MD,NIKA  Date and Time: 2024 4:30 PM  OB Hospitalist                 ED Course as of 24 1630      1502 BP(!): 145/74 [CD]   1510 Patient is a 34 y.o.  at 36 weeks presents to [facility] from [facility] for evaluation of an elevated BP reading.  No Pre-E symptoms, Pre-E labs drawn. [CD]   1547 BP(!): 145/74 [CD]   1548 Creatinine: 0.6 [CD]   1548 AST: 15 [CD]   1548 ALT: 12 [CD]   1548 Hemoglobin: 12.8 [CD]   1548 Hematocrit(!): 36.9 [CD]   1548 Platelet Count: 235 [CD]   1551 Urine Protein/Creatinine Ratio: Unable to calculate [CD]   1551 BP: 128/82 [CD]      ED Course User Index  [CD] Gita Bonilla MD                           Clinical Impression:  Final diagnoses:  [Z3A.36] 36 weeks gestation of pregnancy  [O16.3] Elevated blood pressure affecting pregnancy in third trimester, antepartum (Primary)          ED Disposition Condition    Discharge Stable          ED Prescriptions       Medication Sig Dispense Start Date End Date Auth. Provider    metroNIDAZOLE (FLAGYL) 500 MG tablet Take 1 tablet (500 mg total) by mouth every 12 (twelve) hours. for 7 days 14 tablet 2024 Catie Powell MD          Follow-up Information    None          Gita Bonilla MD  24 1630

## 2024-11-22 NOTE — PROGRESS NOTES
MATERNAL-FETAL MEDICINE   CONSULT NOTE    Provider requesting consultation: Dr. Shirley     SUBJECTIVE:     Ms. Tamara Aldana is a 34 y.o.  female with IUP at 36w4d who is seen in consultation by MFM for:   Fetal growth restriction     Epic and Viewpoint notes reviewed          Medication List with Changes/Refills   Current Medications    ACYCLOVIR 5% (ZOVIRAX) 5 % OINTMENT    Apply topically every 3 (three) hours.    ASPIRIN (ECOTRIN) 81 MG EC TABLET    Take 81 mg by mouth once daily.    CEPHALEXIN (KEFLEX) 500 MG CAPSULE    Take 1 capsule (500 mg total) by mouth every 8 (eight) hours. for 3 days    CETIRIZINE (ZYRTEC) 10 MG TABLET    Take 10 mg by mouth once daily.    FERROUS SULFATE, DRIED (SLOW FE) 160 MG (50 MG IRON) TBSR    Take 160 mg by mouth once daily.    VALACYCLOVIR (VALTREX) 1000 MG TABLET    Take 2 tablets (2,000 mg total) by mouth 2 (two) times daily as needed (for 1 day as needed for cold sore).       Review of patient's allergies indicates:   Allergen Reactions    Bactrim [sulfamethoxazole-trimethoprim] Itching       PMH:  Past Medical History:   Diagnosis Date    ADD (attention deficit disorder)     Anxiety     History of panic attacks        PObHx:  OB History    Para Term  AB Living   2 1 1 0 0 1   SAB IAB Ectopic Multiple Live Births   0 0 0 0 1      # Outcome Date GA Lbr Bharath/2nd Weight Sex Type Anes PTL Lv   2 Current            1 Term 21 37w2d  2.6 kg (5 lb 11.7 oz) M CS-LTranv Spinal, EPI N MICHAEL      Complications: Failure to Progress in Second Stage      Obstetric Comments   Menarche ~ 15       PSH:  Past Surgical History:   Procedure Laterality Date     SECTION N/A 2021    Procedure:  SECTION;  Surgeon: Julie R. Jeansonne, MD;  Location: [facility] L&D;  Service: OB/GYN;  Laterality: N/A;    COLONOSCOPY N/A 2023    Procedure: COLONOSCOPY;  Surgeon: Brayan Ledezma MD;  Location: [facility] ENDO;  Service: Endoscopy;  Laterality: N/A;     ESOPHAGOGASTRODUODENOSCOPY N/A 2023    Procedure: EGD (ESOPHAGOGASTRODUODENOSCOPY);  Surgeon: Brayan Ledezma MD;  Location: [facility];  Service: Endoscopy;  Laterality: N/A;    LASIK Bilateral 2012    TONSILLECTOMY      WISDOM TOOTH EXTRACTION         Family history:family history includes Cancer in her paternal grandfather; Colon cancer in her paternal grandfather; Colon polyps in her father; Hyperlipidemia in her mother; Schizophrenia in her mother.    Social history: reports that she has never smoked. She has never been exposed to tobacco smoke. She has never used smokeless tobacco. She reports that she does not currently use alcohol. She reports that she does not use drugs.      Objective:   LMP 2024     Ultrasound performed. See viewpoint for full ultrasound report.  1. 36w 4d aquino intrauterine pregnancy  2. Fetal growth restriction with EFW = 2447 gms at 9% and AC 14%   3. No fetal structural abnormalities appreciated for organs evaluated today   4. Breech presentation   5. Amniotic fluid volume wnl per MVP 3.4 cm  6. BPP without NST = 8/8  7. Umbilical artery S/D ratio wnl at 44%     Patient counseled on today's ultrasound evaluation including but not limited to above information and recommendations. Patient's questions were answered.     Significant labs/imaging:  Cell free DNA screen negative     ASSESSMENT/PLAN:     34 y.o.  female with IUP at 36w4d     Patient without complaints today    The patients fetus has been diagnosed with FGR based on EFW < 10th percentile at 9% (AC 14%).  Potential etiologies of FGR include but are not limited to normal variation of stature, placental insufficiency, chromosomal abnormalities, genetic disorders, infections, medical conditions, teratogen exposure and other etiologies. Pregnancies complicated by FGR are at increased risk of stillbirth. We discussed approximate delivery timing and recommendations for antepartum testing. FGR,  by itself, is not an indication for  delivery, although there is an increased risk of needing a  due to heart rate abnormalities. Mode of delivery will be dictated by overall clinical status and doppler abnormalities (if any). Fetus is breech presentation today and patient has had a prior  section     Recommendations for FGR management as listed below were discussed with patient    Recommendations:  * Twice weekly  fetal surveillance with NST and BPP until delivery.   * \Weekly UA dopplers.   * Patient counseled re: fetal movement monitoring and decreased fetal movement  * Monitor closely for any signs of evolving preeclampsia.  * If  testing is non-reassuring, delivery may be warranted regardless of GA.  * For all pregnancies with FGR, the placenta should be sent to pathology for examination.  * Mode of delivery will be dictated by overall clinical status and doppler abnormalities (if any).     FGR (EFW 3rd-9th or EFW > 10th with AC < 10th percentile)   Normal testing, No co-morbid conditions: 38 0/7- 39 0/7   UA Doppler S/D ratio > 95th percentile: 37 0/7- 37 6/7   Co-morbid conditions: 37 0/7- 37 6/7 (see below)    Maternal co-morbid conditions - CHTN, pregestational DM, renal disease, autoimmune disease, AMA = 40    Earlier delivery can be considered in conjunction with MFM in the setting of FGR with preeclampsia/gestational hypertension (36 0/7-37 0/7 weeks),or UA Doppler abnormalities (ie EFW < 3rd percentile with elevated UA Dopplers)       OF note:  After MFM consultation, patient was asked to return to MFM clinic for blood pressure check. Her blood pressure initial was 185/95 mmhg and repeat was 149/95 mmhg.   Patient was counseled on recommendation for her to have evaluation in [facility] -evaluate for gestational hypertension or preeclampsia.     Dr. Shirley was notified and I spoke to hospitalist on call for [facility] about patient.       About 25-30 minutes of total time  spent on the encounter, which includes face to face time and non-face to face time preparing to see the patient (eg, review of tests), obtaining and/or reviewing separately obtained history, documenting clinical information in the electronic or other health record, independently interpreting results (not separately reported) and communicating results to the patient/family/caregiver, or care coordination (not separately reported).      Roseanne Friedman  Maternal-Fetal Medicine    Electronically Signed by Roseanne Friedman November 22, 2024

## 2024-11-24 ENCOUNTER — PATIENT MESSAGE (OUTPATIENT)
Dept: OBSTETRICS AND GYNECOLOGY | Facility: CLINIC | Age: 34
End: 2024-11-24
Payer: COMMERCIAL

## 2024-11-25 ENCOUNTER — PATIENT MESSAGE (OUTPATIENT)
Dept: OBSTETRICS AND GYNECOLOGY | Facility: CLINIC | Age: 34
End: 2024-11-25
Payer: COMMERCIAL

## 2024-11-25 ENCOUNTER — PATIENT MESSAGE (OUTPATIENT)
Dept: MATERNAL FETAL MEDICINE | Facility: CLINIC | Age: 34
End: 2024-11-25
Payer: COMMERCIAL

## 2024-11-25 ENCOUNTER — TELEPHONE (OUTPATIENT)
Dept: OBSTETRICS AND GYNECOLOGY | Facility: OTHER | Age: 34
End: 2024-11-25
Payer: COMMERCIAL

## 2024-11-25 ENCOUNTER — HOSPITAL ENCOUNTER (INPATIENT)
Facility: OTHER | Age: 34
LOS: 2 days | Discharge: HOME | End: 2024-11-27
Attending: OBSTETRICS & GYNECOLOGY | Admitting: OBSTETRICS & GYNECOLOGY
Payer: COMMERCIAL

## 2024-11-25 ENCOUNTER — ANESTHESIA EVENT (OUTPATIENT)
Dept: OBSTETRICS AND GYNECOLOGY | Facility: OTHER | Age: 34
End: 2024-11-25
Payer: COMMERCIAL

## 2024-11-25 DIAGNOSIS — Z86.19 HISTORY OF SHINGLES: ICD-10-CM

## 2024-11-25 DIAGNOSIS — Z3A.37 37 WEEKS GESTATION OF PREGNANCY: ICD-10-CM

## 2024-11-25 DIAGNOSIS — Z98.891 STATUS POST REPEAT LOW TRANSVERSE CESAREAN SECTION: Primary | ICD-10-CM

## 2024-11-25 DIAGNOSIS — O34.219 PREVIOUS CESAREAN DELIVERY AFFECTING PREGNANCY: ICD-10-CM

## 2024-11-25 DIAGNOSIS — O13.3 GESTATIONAL HYPERTENSION, THIRD TRIMESTER: ICD-10-CM

## 2024-11-25 PROBLEM — R30.0 DYSURIA: Status: RESOLVED | Noted: 2024-11-20 | Resolved: 2024-11-25

## 2024-11-25 PROBLEM — N91.2 AMENORRHEA: Status: RESOLVED | Noted: 2024-05-10 | Resolved: 2024-11-25

## 2024-11-25 PROBLEM — O36.5990 FETAL GROWTH RESTRICTION ANTEPARTUM: Status: ACTIVE | Noted: 2024-11-25

## 2024-11-25 PROBLEM — O14.10 SEVERE PREECLAMPSIA: Status: RESOLVED | Noted: 2021-08-11 | Resolved: 2024-11-25

## 2024-11-25 PROBLEM — N39.0 URINARY TRACT INFECTION WITHOUT HEMATURIA: Status: RESOLVED | Noted: 2024-11-20 | Resolved: 2024-11-25

## 2024-11-25 LAB
ABO + RH BLD: NORMAL
ALBUMIN SERPL BCP-MCNC: 3 G/DL (ref 3.5–5.2)
ALP SERPL-CCNC: 111 U/L (ref 40–150)
ALT SERPL W/O P-5'-P-CCNC: 14 U/L (ref 10–44)
ANION GAP SERPL CALC-SCNC: 9 MMOL/L (ref 8–16)
AST SERPL-CCNC: 20 U/L (ref 10–40)
BASOPHILS # BLD AUTO: 0.03 K/UL (ref 0–0.2)
BASOPHILS NFR BLD: 0.3 % (ref 0–1.9)
BILIRUB SERPL-MCNC: 0.3 MG/DL (ref 0.1–1)
BLD GP AB SCN CELLS X3 SERPL QL: NORMAL
BUN SERPL-MCNC: 6 MG/DL (ref 6–20)
CALCIUM SERPL-MCNC: 8.8 MG/DL (ref 8.7–10.5)
CHLORIDE SERPL-SCNC: 108 MMOL/L (ref 95–110)
CO2 SERPL-SCNC: 18 MMOL/L (ref 23–29)
CREAT SERPL-MCNC: 0.7 MG/DL (ref 0.5–1.4)
CREAT UR-MCNC: 44.5 MG/DL (ref 15–325)
DIFFERENTIAL METHOD BLD: ABNORMAL
EOSINOPHIL # BLD AUTO: 0.1 K/UL (ref 0–0.5)
EOSINOPHIL NFR BLD: 0.8 % (ref 0–8)
ERYTHROCYTE [DISTWIDTH] IN BLOOD BY AUTOMATED COUNT: 13.3 % (ref 11.5–14.5)
EST. GFR  (NO RACE VARIABLE): >60 ML/MIN/1.73 M^2
GLUCOSE SERPL-MCNC: 101 MG/DL (ref 70–110)
HCT VFR BLD AUTO: 36.1 % (ref 37–48.5)
HGB BLD-MCNC: 12.7 G/DL (ref 12–16)
HIV 1+2 AB+HIV1 P24 AG SERPL QL IA: NEGATIVE
IMM GRANULOCYTES # BLD AUTO: 0.03 K/UL (ref 0–0.04)
IMM GRANULOCYTES NFR BLD AUTO: 0.3 % (ref 0–0.5)
LYMPHOCYTES # BLD AUTO: 1.9 K/UL (ref 1–4.8)
LYMPHOCYTES NFR BLD: 20.2 % (ref 18–48)
MCH RBC QN AUTO: 32.6 PG (ref 27–31)
MCHC RBC AUTO-ENTMCNC: 35.2 G/DL (ref 32–36)
MCV RBC AUTO: 93 FL (ref 82–98)
MONOCYTES # BLD AUTO: 0.5 K/UL (ref 0.3–1)
MONOCYTES NFR BLD: 5.6 % (ref 4–15)
NEUTROPHILS # BLD AUTO: 6.9 K/UL (ref 1.8–7.7)
NEUTROPHILS NFR BLD: 72.8 % (ref 38–73)
NRBC BLD-RTO: 0 /100 WBC
PLATELET # BLD AUTO: 229 K/UL (ref 150–450)
PMV BLD AUTO: 10.9 FL (ref 9.2–12.9)
POTASSIUM SERPL-SCNC: 3.8 MMOL/L (ref 3.5–5.1)
PROT SERPL-MCNC: 6.1 G/DL (ref 6–8.4)
PROT UR-MCNC: <7 MG/DL (ref 0–15)
PROT/CREAT UR: NORMAL MG/G{CREAT} (ref 0–0.2)
RBC # BLD AUTO: 3.9 M/UL (ref 4–5.4)
SODIUM SERPL-SCNC: 135 MMOL/L (ref 136–145)
SPECIMEN OUTDATE: NORMAL
TREPONEMA PALLIDUM IGG+IGM AB [PRESENCE] IN SERUM OR PLASMA BY IMMUNOASSAY: NONREACTIVE
WBC # BLD AUTO: 9.51 K/UL (ref 3.9–12.7)

## 2024-11-25 PROCEDURE — 87081 CULTURE SCREEN ONLY: CPT

## 2024-11-25 PROCEDURE — 63600175 PHARM REV CODE 636 W HCPCS

## 2024-11-25 PROCEDURE — 25000003 PHARM REV CODE 250

## 2024-11-25 PROCEDURE — 71000033 HC RECOVERY, INTIAL HOUR: Performed by: OBSTETRICS & GYNECOLOGY

## 2024-11-25 PROCEDURE — 86900 BLOOD TYPING SEROLOGIC ABO: CPT

## 2024-11-25 PROCEDURE — 27201423 OPTIME MED/SURG SUP & DEVICES STERILE SUPPLY: Performed by: OBSTETRICS & GYNECOLOGY

## 2024-11-25 PROCEDURE — 85025 COMPLETE CBC W/AUTO DIFF WBC: CPT

## 2024-11-25 PROCEDURE — 80053 COMPREHEN METABOLIC PANEL: CPT

## 2024-11-25 PROCEDURE — 88307 TISSUE EXAM BY PATHOLOGIST: CPT | Performed by: STUDENT IN AN ORGANIZED HEALTH CARE EDUCATION/TRAINING PROGRAM

## 2024-11-25 PROCEDURE — 82570 ASSAY OF URINE CREATININE: CPT

## 2024-11-25 PROCEDURE — 37000009 HC ANESTHESIA EA ADD 15 MINS: Performed by: OBSTETRICS & GYNECOLOGY

## 2024-11-25 PROCEDURE — 36004724 HC OB OR TIME LEV III - 1ST 15 MIN: Performed by: OBSTETRICS & GYNECOLOGY

## 2024-11-25 PROCEDURE — 4A1HXCZ MONITORING OF PRODUCTS OF CONCEPTION, CARDIAC RATE, EXTERNAL APPROACH: ICD-10-PCS | Performed by: OBSTETRICS & GYNECOLOGY

## 2024-11-25 PROCEDURE — 51702 INSERT TEMP BLADDER CATH: CPT

## 2024-11-25 PROCEDURE — 86593 SYPHILIS TEST NON-TREP QUANT: CPT

## 2024-11-25 PROCEDURE — 36004725 HC OB OR TIME LEV III - EA ADD 15 MIN: Performed by: OBSTETRICS & GYNECOLOGY

## 2024-11-25 PROCEDURE — 71000039 HC RECOVERY, EACH ADD'L HOUR: Performed by: OBSTETRICS & GYNECOLOGY

## 2024-11-25 PROCEDURE — 11000001 HC ACUTE MED/SURG PRIVATE ROOM

## 2024-11-25 PROCEDURE — 87389 HIV-1 AG W/HIV-1&-2 AB AG IA: CPT

## 2024-11-25 PROCEDURE — 37000008 HC ANESTHESIA 1ST 15 MINUTES: Performed by: OBSTETRICS & GYNECOLOGY

## 2024-11-25 RX ORDER — ACETAMINOPHEN 325 MG/1
650 TABLET ORAL
Status: DISCONTINUED | OUTPATIENT
Start: 2024-11-25 | End: 2024-11-27 | Stop reason: HOSPADM

## 2024-11-25 RX ORDER — DIPHENHYDRAMINE HYDROCHLORIDE 50 MG/ML
12.5 INJECTION INTRAMUSCULAR; INTRAVENOUS
Status: DISCONTINUED | OUTPATIENT
Start: 2024-11-25 | End: 2024-11-27 | Stop reason: HOSPADM

## 2024-11-25 RX ORDER — PROCHLORPERAZINE EDISYLATE 5 MG/ML
5 INJECTION INTRAMUSCULAR; INTRAVENOUS EVERY 6 HOURS PRN
Status: DISCONTINUED | OUTPATIENT
Start: 2024-11-25 | End: 2024-11-27 | Stop reason: HOSPADM

## 2024-11-25 RX ORDER — MISOPROSTOL 200 UG/1
800 TABLET ORAL ONCE AS NEEDED
Status: DISCONTINUED | OUTPATIENT
Start: 2024-11-25 | End: 2024-11-27 | Stop reason: HOSPADM

## 2024-11-25 RX ORDER — AMOXICILLIN 250 MG
1 CAPSULE ORAL NIGHTLY PRN
Status: DISCONTINUED | OUTPATIENT
Start: 2024-11-25 | End: 2024-11-27 | Stop reason: HOSPADM

## 2024-11-25 RX ORDER — HYDROCORTISONE 25 MG/G
CREAM TOPICAL 3 TIMES DAILY PRN
Status: DISCONTINUED | OUTPATIENT
Start: 2024-11-25 | End: 2024-11-27 | Stop reason: HOSPADM

## 2024-11-25 RX ORDER — SODIUM CHLORIDE, SODIUM LACTATE, POTASSIUM CHLORIDE, CALCIUM CHLORIDE 600; 310; 30; 20 MG/100ML; MG/100ML; MG/100ML; MG/100ML
INJECTION, SOLUTION INTRAVENOUS CONTINUOUS
Status: DISCONTINUED | OUTPATIENT
Start: 2024-11-25 | End: 2024-11-27 | Stop reason: HOSPADM

## 2024-11-25 RX ORDER — FAMOTIDINE 10 MG/ML
20 INJECTION INTRAVENOUS
Status: COMPLETED | OUTPATIENT
Start: 2024-11-25 | End: 2024-11-25

## 2024-11-25 RX ORDER — OXYTOCIN 10 [USP'U]/ML
INJECTION, SOLUTION INTRAMUSCULAR; INTRAVENOUS
Status: DISCONTINUED | OUTPATIENT
Start: 2024-11-25 | End: 2024-11-25

## 2024-11-25 RX ORDER — CEFAZOLIN 2 G/1
2 INJECTION, POWDER, FOR SOLUTION INTRAMUSCULAR; INTRAVENOUS
Status: DISCONTINUED | OUTPATIENT
Start: 2024-11-25 | End: 2024-11-27 | Stop reason: HOSPADM

## 2024-11-25 RX ORDER — KETOROLAC TROMETHAMINE 30 MG/ML
30 INJECTION, SOLUTION INTRAMUSCULAR; INTRAVENOUS
Status: COMPLETED | OUTPATIENT
Start: 2024-11-25 | End: 2024-11-26

## 2024-11-25 RX ORDER — ACETAMINOPHEN 500 MG
1000 TABLET ORAL ONCE
Status: DISCONTINUED | OUTPATIENT
Start: 2024-11-25 | End: 2024-11-25

## 2024-11-25 RX ORDER — ACETAMINOPHEN 500 MG
1000 TABLET ORAL
Status: COMPLETED | OUTPATIENT
Start: 2024-11-25 | End: 2024-11-25

## 2024-11-25 RX ORDER — DEXAMETHASONE SODIUM PHOSPHATE 4 MG/ML
INJECTION, SOLUTION INTRA-ARTICULAR; INTRALESIONAL; INTRAMUSCULAR; INTRAVENOUS; SOFT TISSUE
Status: DISCONTINUED | OUTPATIENT
Start: 2024-11-25 | End: 2024-11-25

## 2024-11-25 RX ORDER — MORPHINE SULFATE 0.5 MG/ML
INJECTION, SOLUTION EPIDURAL; INTRATHECAL; INTRAVENOUS
Status: DISCONTINUED | OUTPATIENT
Start: 2024-11-25 | End: 2024-11-25

## 2024-11-25 RX ORDER — DIPHENHYDRAMINE HCL 25 MG
25 CAPSULE ORAL EVERY 6 HOURS PRN
Status: DISCONTINUED | OUTPATIENT
Start: 2024-11-25 | End: 2024-11-27 | Stop reason: HOSPADM

## 2024-11-25 RX ORDER — OXYCODONE HYDROCHLORIDE 10 MG/1
10 TABLET ORAL EVERY 4 HOURS PRN
Status: DISCONTINUED | OUTPATIENT
Start: 2024-11-25 | End: 2024-11-27 | Stop reason: HOSPADM

## 2024-11-25 RX ORDER — FENTANYL CITRATE 50 UG/ML
INJECTION, SOLUTION INTRAMUSCULAR; INTRAVENOUS
Status: DISCONTINUED | OUTPATIENT
Start: 2024-11-25 | End: 2024-11-25

## 2024-11-25 RX ORDER — ACETAMINOPHEN 500 MG
1000 TABLET ORAL EVERY 8 HOURS
OUTPATIENT
Start: 2024-11-25 | End: 2024-11-26

## 2024-11-25 RX ORDER — METHYLERGONOVINE MALEATE 0.2 MG/ML
200 INJECTION INTRAVENOUS ONCE AS NEEDED
Status: DISCONTINUED | OUTPATIENT
Start: 2024-11-25 | End: 2024-11-27 | Stop reason: HOSPADM

## 2024-11-25 RX ORDER — ONDANSETRON HYDROCHLORIDE 2 MG/ML
4 INJECTION, SOLUTION INTRAVENOUS EVERY 12 HOURS PRN
OUTPATIENT
Start: 2024-11-25

## 2024-11-25 RX ORDER — ONDANSETRON HYDROCHLORIDE 2 MG/ML
4 INJECTION, SOLUTION INTRAVENOUS EVERY 6 HOURS PRN
Status: DISCONTINUED | OUTPATIENT
Start: 2024-11-25 | End: 2024-11-27 | Stop reason: HOSPADM

## 2024-11-25 RX ORDER — ONDANSETRON 8 MG/1
8 TABLET, ORALLY DISINTEGRATING ORAL EVERY 8 HOURS PRN
Status: DISCONTINUED | OUTPATIENT
Start: 2024-11-25 | End: 2024-11-27 | Stop reason: HOSPADM

## 2024-11-25 RX ORDER — NALOXONE HCL 0.4 MG/ML
0.04 VIAL (ML) INJECTION
OUTPATIENT
Start: 2024-11-25

## 2024-11-25 RX ORDER — OXYCODONE HYDROCHLORIDE 5 MG/1
5 TABLET ORAL EVERY 4 HOURS PRN
Status: DISCONTINUED | OUTPATIENT
Start: 2024-11-25 | End: 2024-11-27 | Stop reason: HOSPADM

## 2024-11-25 RX ORDER — ONDANSETRON HYDROCHLORIDE 2 MG/ML
INJECTION, SOLUTION INTRAMUSCULAR; INTRAVENOUS
Status: DISCONTINUED | OUTPATIENT
Start: 2024-11-25 | End: 2024-11-25

## 2024-11-25 RX ORDER — DOCUSATE SODIUM 100 MG/1
200 CAPSULE, LIQUID FILLED ORAL 2 TIMES DAILY
Status: DISCONTINUED | OUTPATIENT
Start: 2024-11-25 | End: 2024-11-27 | Stop reason: HOSPADM

## 2024-11-25 RX ORDER — SODIUM CITRATE AND CITRIC ACID MONOHYDRATE 334; 500 MG/5ML; MG/5ML
30 SOLUTION ORAL
Status: COMPLETED | OUTPATIENT
Start: 2024-11-25 | End: 2024-11-25

## 2024-11-25 RX ORDER — SIMETHICONE 80 MG
1 TABLET,CHEWABLE ORAL EVERY 6 HOURS PRN
Status: DISCONTINUED | OUTPATIENT
Start: 2024-11-25 | End: 2024-11-27 | Stop reason: HOSPADM

## 2024-11-25 RX ORDER — TRANEXAMIC ACID 10 MG/ML
1000 INJECTION, SOLUTION INTRAVENOUS EVERY 30 MIN PRN
Status: DISCONTINUED | OUTPATIENT
Start: 2024-11-25 | End: 2024-11-27 | Stop reason: HOSPADM

## 2024-11-25 RX ORDER — CARBOPROST TROMETHAMINE 250 UG/ML
250 INJECTION, SOLUTION INTRAMUSCULAR
Status: DISCONTINUED | OUTPATIENT
Start: 2024-11-25 | End: 2024-11-27 | Stop reason: HOSPADM

## 2024-11-25 RX ORDER — NALBUPHINE HYDROCHLORIDE 10 MG/ML
5 INJECTION INTRAMUSCULAR; INTRAVENOUS; SUBCUTANEOUS ONCE AS NEEDED
Status: DISCONTINUED | OUTPATIENT
Start: 2024-11-25 | End: 2024-11-27 | Stop reason: HOSPADM

## 2024-11-25 RX ORDER — IBUPROFEN 400 MG/1
800 TABLET ORAL
Status: DISCONTINUED | OUTPATIENT
Start: 2024-11-26 | End: 2024-11-27 | Stop reason: HOSPADM

## 2024-11-25 RX ORDER — PRENATAL WITH FERROUS FUM AND FOLIC ACID 3080; 920; 120; 400; 22; 1.84; 3; 20; 10; 1; 12; 200; 27; 25; 2 [IU]/1; [IU]/1; MG/1; [IU]/1; MG/1; MG/1; MG/1; MG/1; MG/1; MG/1; UG/1; MG/1; MG/1; MG/1; MG/1
1 TABLET ORAL DAILY
Status: DISCONTINUED | OUTPATIENT
Start: 2024-11-26 | End: 2024-11-27 | Stop reason: HOSPADM

## 2024-11-25 RX ORDER — PROCHLORPERAZINE EDISYLATE 5 MG/ML
5 INJECTION INTRAMUSCULAR; INTRAVENOUS EVERY 6 HOURS PRN
OUTPATIENT
Start: 2024-11-25

## 2024-11-25 RX ORDER — OXYTOCIN-SODIUM CHLORIDE 0.9% IV SOLN 30 UNIT/500ML 30-0.9/5 UT/ML-%
95 SOLUTION INTRAVENOUS CONTINUOUS PRN
Status: DISCONTINUED | OUTPATIENT
Start: 2024-11-25 | End: 2024-11-27 | Stop reason: HOSPADM

## 2024-11-25 RX ORDER — PHENYLEPHRINE HYDROCHLORIDE 10 MG/ML
INJECTION INTRAVENOUS
Status: DISCONTINUED | OUTPATIENT
Start: 2024-11-25 | End: 2024-11-25

## 2024-11-25 RX ADMIN — KETOROLAC TROMETHAMINE 30 MG: 30 INJECTION, SOLUTION INTRAMUSCULAR at 08:11

## 2024-11-25 RX ADMIN — PHENYLEPHRINE HYDROCHLORIDE 100 MCG: 10 INJECTION INTRAVENOUS at 07:11

## 2024-11-25 RX ADMIN — OXYTOCIN 5 UNITS: 10 INJECTION, SOLUTION INTRAMUSCULAR; INTRAVENOUS at 07:11

## 2024-11-25 RX ADMIN — DEXAMETHASONE SODIUM PHOSPHATE 4 MG: 4 INJECTION, SOLUTION INTRAMUSCULAR; INTRAVENOUS at 07:11

## 2024-11-25 RX ADMIN — Medication 0.1 MG: at 07:11

## 2024-11-25 RX ADMIN — ONDANSETRON 4 MG: 2 INJECTION INTRAMUSCULAR; INTRAVENOUS at 07:11

## 2024-11-25 RX ADMIN — PHENYLEPHRINE HYDROCHLORIDE 200 MCG: 10 INJECTION INTRAVENOUS at 07:11

## 2024-11-25 RX ADMIN — SODIUM CITRATE AND CITRIC ACID MONOHYDRATE 30 ML: 500; 334 SOLUTION ORAL at 06:11

## 2024-11-25 RX ADMIN — SODIUM CHLORIDE, POTASSIUM CHLORIDE, SODIUM LACTATE AND CALCIUM CHLORIDE: 600; 310; 30; 20 INJECTION, SOLUTION INTRAVENOUS at 06:11

## 2024-11-25 RX ADMIN — SODIUM CHLORIDE 2 G: 9 INJECTION, SOLUTION INTRAVENOUS at 07:11

## 2024-11-25 RX ADMIN — PHENYLEPHRINE HYDROCHLORIDE 0.5 MCG/KG/MIN: 10 INJECTION INTRAVENOUS at 07:11

## 2024-11-25 RX ADMIN — PHENYLEPHRINE HYDROCHLORIDE 100 MCG: 10 INJECTION INTRAVENOUS at 08:11

## 2024-11-25 RX ADMIN — ACETAMINOPHEN 1000 MG: 500 TABLET, FILM COATED ORAL at 06:11

## 2024-11-25 RX ADMIN — FAMOTIDINE 20 MG: 10 INJECTION, SOLUTION INTRAVENOUS at 06:11

## 2024-11-25 RX ADMIN — FENTANYL CITRATE 10 MCG: 50 INJECTION, SOLUTION INTRAMUSCULAR; INTRAVENOUS at 07:11

## 2024-11-25 NOTE — ED PROVIDER NOTES
Encounter Date: 2024       History     Chief Complaint   Patient presents with    Hypertension    Headache     Tamara Aldana is a 34 y.o. female   @ 37w0d presenting for elevated BP. Pt seen in [facility] 3 days ago for severe range BP followed by normotensive. Pt was discharged to home. Today, noted to have elevated readings and a headache at home. Presented to [facility] for concerns.     Patient denies dizziness, visual changes, chest pain, shortness of breath, nausea or vomiting and right upper quadrant pain. Reports some tightening (possible contractions), denies LOF, VB, decreased FM    Last ate at 11am sandwich    Reports hx shingles on face, for which she has an outbreak ~once per year. Denies hx STD, specifically HSV.         Review of patient's allergies indicates:   Allergen Reactions    Bactrim [sulfamethoxazole-trimethoprim] Itching     Past Medical History:   Diagnosis Date    ADD (attention deficit disorder)     Anxiety     History of panic attacks      Past Surgical History:   Procedure Laterality Date     SECTION N/A 2021    Procedure:  SECTION;  Surgeon: Julie R. Jeansonne, MD;  Location: [facility] L&D;  Service: OB/GYN;  Laterality: N/A;    COLONOSCOPY N/A 2023    Procedure: COLONOSCOPY;  Surgeon: Brayan Ledezma MD;  Location: [facility];  Service: Endoscopy;  Laterality: N/A;    ESOPHAGOGASTRODUODENOSCOPY N/A 2023    Procedure: EGD (ESOPHAGOGASTRODUODENOSCOPY);  Surgeon: Brayan Ledezma MD;  Location: [facility];  Service: Endoscopy;  Laterality: N/A;    LASIK Bilateral 2012    TONSILLECTOMY      WISDOM TOOTH EXTRACTION       Family History   Problem Relation Name Age of Onset    Colon cancer Paternal Grandfather      Cancer Paternal Grandfather      Colon polyps Father      Hyperlipidemia Mother      Schizophrenia Mother      Breast cancer Neg Hx      Ovarian cancer Neg Hx      Melanoma Neg Hx      Uterine cancer Neg Hx       Cervical cancer Neg Hx       Social History     Tobacco Use    Smoking status: Never     Passive exposure: Never    Smokeless tobacco: Never   Substance Use Topics    Alcohol use: Not Currently    Drug use: No     Review of Systems   Constitutional:  Negative for fever.   HENT:  Negative for sore throat.    Respiratory:  Negative for shortness of breath.    Cardiovascular:  Negative for chest pain.   Gastrointestinal:  Negative for nausea.   Genitourinary:  Negative for dysuria.   Musculoskeletal:  Negative for back pain.   Skin:  Negative for rash.   Neurological:  Positive for headaches. Negative for weakness.   Hematological:  Does not bruise/bleed easily.       Physical Exam     Initial Vitals   BP Pulse Resp Temp SpO2   11/25/24 1205 11/25/24 1209 11/25/24 1205 11/25/24 1205 11/25/24 1205   (!) 135/97 104 16 98 °F (36.7 °C) 96 %      MAP       --                Physical Exam    Vitals reviewed.  Constitutional: She appears well-developed.   HENT:   Head: Normocephalic.   Nose: Nose normal.   Eyes: Conjunctivae are normal. Pupils are equal, round, and reactive to light.   Neck:   Normal range of motion.  Cardiovascular:  Normal rate and intact distal pulses.           Pulmonary/Chest: Breath sounds normal. No respiratory distress. She exhibits no tenderness.   Abdominal: Abdomen is soft. Bowel sounds are normal. She exhibits no mass.   Musculoskeletal:         General: Normal range of motion.      Cervical back: Normal range of motion.     Neurological: She is alert and oriented to person, place, and time.   Skin: Skin is warm and dry.   Psychiatric: She has a normal mood and affect. Thought content normal.         ED Course   Fetal non-stress test    Date/Time: 11/25/2024 12:44 PM    Performed by: Rachael Pimentel MD  Authorized by: Rachael Pimentel MD    Nonstress Test:     Variability:  6-25 BPM    Decelerations:  None    Accelerations:  15 bpm    Acoustic Stimulator: No      Baseline:   135    Contractions:  Not present  Biophysical Profile:     Nonstress Test Interpretation: reactive      Overall Impression:  Reassuring  Post-procedure:     Patient tolerance:  Patient tolerated the procedure well with no immediate complications     Usg: cephalic    Labs Reviewed - No data to display       Imaging Results    None     Transabdominal Ultrasound for Admission  2024   12:49 PM     Indication: Admission to labor and delivery    Presentation: cephalic            Medications - No data to display  Medical Decision Making            Attending Attestation:             Attending ED Notes:   I have primarily seen and examined the patient without resident involvement due to high patient volume.  Questions welcomed and answered to patient satisfaction.      @ 37w0d  presenting for GHTN  NST: reactive and reassuring     GHTN  - now meets criteria for GHTN  - PreE labs pending  - reports headache: will treat with tylenol  - delivery today at 37 weeks    Previous x1  - declines   - SVE cl/th/hi @ 1251  - USG cephalic  - NPO at 7PM      Admit for delivery.     Rachael France MD  2024 12:50 PM                                   Clinical Impression:    :  Gestational hypertension, third trimester (Primary)  37 weeks gestation of pregnancy  Previous  delivery affecting pregnancy  History of shingles              Rachael Pimentel MD  [phone]

## 2024-11-25 NOTE — H&P
HISTORY AND PHYSICAL                                                OBSTETRICS          Subjective:       Tamara Aldana is a 34 y.o.  female with IUP at 37w0d weeks gestation who was admitted from the [facility] for TCs 2/2 gHTN.  Patient presented to [facility] with complaints of elevated BP readings at home and a persistent headache, in [facility] patient met criteria for gHTN    Patient denies contractions, denies vaginal bleeding, denies LOF.   Fetal Movement: normal.    This IUP is complicated by gHTN, H/o PreE, Depression/Anxiety, Bicornuate Uterus, FGR       Review of Systems   Constitutional:  Negative for chills and fever.   HENT:  Negative for nasal congestion and mouth sores.    Eyes:  Negative for visual disturbance.   Respiratory:  Negative for cough and shortness of breath.    Cardiovascular:  Negative for chest pain and palpitations.   Gastrointestinal:  Negative for constipation, diarrhea, nausea and vomiting.   Genitourinary:  Negative for dysuria, pelvic pain, vaginal bleeding and vaginal discharge.   Musculoskeletal:  Negative for joint swelling.   Integumentary:  Negative for rash.   Neurological:  Positive for headaches. Negative for syncope and numbness.   Psychiatric/Behavioral:  The patient is not nervous/anxious.      PMHx:   Past Medical History:   Diagnosis Date    ADD (attention deficit disorder)     Anxiety     History of panic attacks        PSHx:   Past Surgical History:   Procedure Laterality Date     SECTION N/A 2021    Procedure:  SECTION;  Surgeon: Julie R. Jeansonne, MD;  Location: [facility] L&D;  Service: OB/GYN;  Laterality: N/A;    COLONOSCOPY N/A 2023    Procedure: COLONOSCOPY;  Surgeon: Brayan Ledezma MD;  Location: [facility];  Service: Endoscopy;  Laterality: N/A;    ESOPHAGOGASTRODUODENOSCOPY N/A 2023    Procedure: EGD (ESOPHAGOGASTRODUODENOSCOPY);  Surgeon: Brayan Ledezma MD;  Location: [facility];  Service: Endoscopy;   Laterality: N/A;    LASIK Bilateral 2012    TONSILLECTOMY  2014    WISDOM TOOTH EXTRACTION  2011       All:   Review of patient's allergies indicates:   Allergen Reactions    Bactrim [sulfamethoxazole-trimethoprim] Itching       Meds: (Not in a hospital admission)      SH:   Social History     Socioeconomic History    Marital status:    Occupational History    Occupation: Pharmacist   Tobacco Use    Smoking status: Never     Passive exposure: Never    Smokeless tobacco: Never   Substance and Sexual Activity    Alcohol use: Not Currently    Drug use: No    Sexual activity: Yes     Partners: Male     Birth control/protection: None     Comment: Engaged:   Other Topics Concern    Are you pregnant or think you may be? No    Breast-feeding Yes     Social Drivers of Health     Financial Resource Strain: Patient Declined (10/14/2024)    Overall Financial Resource Strain (CARDIA)     Difficulty of Paying Living Expenses: Patient declined   Food Insecurity: Patient Declined (10/14/2024)    Hunger Vital Sign     Worried About Running Out of Food in the Last Year: Patient declined     Ran Out of Food in the Last Year: Patient declined   Physical Activity: Insufficiently Active (10/14/2024)    Exercise Vital Sign     Days of Exercise per Week: 4 days     Minutes of Exercise per Session: 30 min   Stress: Stress Concern Present (10/14/2024)    [employer] - Occupational Stress Questionnaire     Feeling of Stress : To some extent   Housing Stability: Unknown (10/14/2024)    Housing Stability Vital Sign     Unable to Pay for Housing in the Last Year: Patient declined       FH:   Family History   Problem Relation Name Age of Onset    Colon cancer Paternal Grandfather      Cancer Paternal Grandfather      Colon polyps Father      Hyperlipidemia Mother      Schizophrenia Mother      Breast cancer Neg Hx      Ovarian cancer Neg Hx      Melanoma Neg Hx      Uterine cancer Neg Hx      Cervical cancer  Neg Hx         OBHx:   OB History    Para Term  AB Living   2 1 1 0 0 1   SAB IAB Ectopic Multiple Live Births   0 0 0 0 1      # Outcome Date GA Lbr Bharath/2nd Weight Sex Type Anes PTL Lv   2 Current            1 Term 21 37w2d  2.6 kg (5 lb 11.7 oz) M CS-LTranv Spinal, EPI N MICHAEL      Complications: Failure to Progress in Second Stage      Name: HARINI MOREJON      Apgar1: 3  Apgar5: 8      Obstetric Comments   Menarche ~ 15       Objective:       BP (!) 138/98   Pulse 94   Temp 98 °F (36.7 °C) (Oral)   Resp 16   LMP 2024   SpO2 97%     Vitals:    24 1215 24 1217 24 1220 24 1221   BP:   (!) 138/98    Pulse: 89 87 105 94   Resp:       Temp:       TempSrc:       SpO2: 97%  97%        General:   alert, appears stated age and cooperative, no apparent distress   HENT:  normocephalic, atraumatic   Eyes:  extraocular movements and conjunctivae normal   Neck:  supple, range of motion normal, no thyromegaly   Lungs:   no respiratory distress   Heart:   regular rate   Abdomen:  soft, non-tender, non-distended but gravid, no rebound or guarding    Extremities Trace pedal edema, negative erythema   FHT: 135 bpm, moderate BTBV, +accels, -decels;  Cat 1 (reassuring)                 TOCO: quiet   Presentations: cephalic by ultrasound   Cervix:  Cl/th/hi check by Dr. Rosas   Sterile Speculum Exam: NA    EFW by Leopold's: 7    Recent Growth Scan: EFW: 2447g (8%) at 37 week     Lab Review  Blood Type A POS  GBBS: unknown  Rubella: Immune  RPR: neg  HIV: negative  HepB: negative       Assessment:       37w0d weeks gestation with     Active Hospital Problems    Diagnosis  POA    *Single delivery by  section [O82]  Unknown      Resolved Hospital Problems   No resolved problems to display.          Plan:      Risks, benefits, alternatives and possible complications have been discussed in detail with the patient.   - Consents signed and to chart  - Admit to Labor and  Delivery unit  - Plan for c/section at 7pm. Last ate at 11 pm    - Epidural per Anesthesia  - Draw CBC, T&S  - Notify Staff  - EFW: 2447g (8%) at 37 week   - Maternal pelvis unproven     gHTN  - BP as above  - mild headache, s/p 1 g tylenol in [facility]  - preE labs collected  - Mag: not indicated  - Hypertensive agent not indicated at this time      Depression/Anxiety  - Mood stable  - Medications: not on any home medication   - Will need 1-2 week postpartum mood check    Bicornuate Uterus  - plan for rLTCs  - serial CL - MFM consult     FGR   - EFW: 2447g (8%) at 37 week ultrasound    Contraception: undecided to discuss with Dr. Newell, discussing between ppBTL & partner vasectomy     Post-Partum Hemorrhage risk - low    Lidya [facility]   OBGYN PGY1

## 2024-11-25 NOTE — ANESTHESIA PREPROCEDURE EVALUATION
[facility]  Anesthesia Pre-Operative Evaluation         Patient Name: Tamara Aldana  YOB: 1990  MRN: [id number]    2024      Tamara Aldana is a 34 y.o. female  at 37w0d who presents for elevated BP readings at home and a persistent headache. Current IUP has been c/b gHTN, H/o PreE, Depression/Anxiety, Bicornuate Uterus, FGR.     Will undergo CS due to elevated BP readings.    Previous pregnancies have been c/b CS.    She reports previous neuraxial anesthesia. She denies complications with these.    She denies history of HTN, asthma, bleeding or coagulation disorders, spine abnormalities, or previous back surgeries.      OB History    Para Term  AB Living   2 1 1 0 0 1   SAB IAB Ectopic Multiple Live Births   0 0 0 0 1      # Outcome Date GA Lbr Bharath/2nd Weight Sex Type Anes PTL Lv   2 Current            1 Term 21 37w2d  2.6 kg (5 lb 11.7 oz) M CS-LTranv Spinal, EPI N MICHAEL      Complications: Failure to Progress in Second Stage      Obstetric Comments   Menarche ~ 15       Review of patient's allergies indicates:   Allergen Reactions    Bactrim [sulfamethoxazole-trimethoprim] Itching       Wt Readings from Last 1 Encounters:   24 1758 68.9 kg (152 lb)       BP Readings from Last 3 Encounters:   24 118/83   24 108/71   24 (!) 149/95       Patient Active Problem List   Diagnosis    Attention-deficit hyperactivity disorder, unspecified type    Previous  delivery affecting pregnancy, antepartum    Depression    Anxiety    Bicornuate uterus affecting pregnancy, antepartum    History of pre-eclampsia in prior pregnancy, currently pregnant    Single delivery by  section    Fetal growth restriction antepartum       Past Surgical History:   Procedure Laterality Date     SECTION N/A 2021    Procedure:  SECTION;  Surgeon: Julie R. Jeansonne, MD;  Location: [facility] L&D;  Service: OB/GYN;  " Laterality: N/A;    COLONOSCOPY N/A 12/4/2023    Procedure: COLONOSCOPY;  Surgeon: Brayan Ledezma MD;  Location: [facility];  Service: Endoscopy;  Laterality: N/A;    ESOPHAGOGASTRODUODENOSCOPY N/A 12/4/2023    Procedure: EGD (ESOPHAGOGASTRODUODENOSCOPY);  Surgeon: Brayan Ledezma MD;  Location: [facility];  Service: Endoscopy;  Laterality: N/A;    LASIK Bilateral 2012    TONSILLECTOMY  2014    WISDOM TOOTH EXTRACTION  2011       Tobacco Use: Low Risk  (11/20/2024)    Patient History     Smoking Tobacco Use: Never     Smokeless Tobacco Use: Never     Passive Exposure: Never     Alcohol Use: Not At Risk (10/14/2024)    AUDIT-C     Frequency of Alcohol Consumption: Never     Average Number of Drinks: Patient does not drink     Frequency of Binge Drinking: Never     Social History     Substance and Sexual Activity   Drug Use No         Chemistry        Component Value Date/Time     (L) 11/25/2024 1309    K 3.8 11/25/2024 1309     11/25/2024 1309    CO2 18 (L) 11/25/2024 1309    BUN 6 11/25/2024 1309    CREATININE 0.7 11/25/2024 1309     11/25/2024 1309        Component Value Date/Time    CALCIUM 8.8 11/25/2024 1309    ALKPHOS 111 11/25/2024 1309    AST 20 11/25/2024 1309    ALT 14 11/25/2024 1309    BILITOT 0.3 11/25/2024 1309    ESTGFRAFRICA >60.0 04/05/2022 0800    EGFRNONAA >60.0 04/05/2022 0800            Lab Results   Component Value Date    WBC 9.51 11/25/2024    HGB 12.7 11/25/2024    HCT 36.1 (L) 11/25/2024     11/25/2024       No results found for: "LABPROT", "INR", "APTT"        Pre-op Assessment    I have reviewed the Patient Summary Reports.     I have reviewed the Nursing Notes. I have reviewed the NPO Status.   I have reviewed the Medications.     Review of Systems  Anesthesia Hx:  No problems with previous Anesthesia   History of prior surgery of interest to airway management or planning:          Denies Family Hx of Anesthesia complications.    Denies " Personal Hx of Anesthesia complications.                    Hematology/Oncology:  Hematology Normal   Oncology Normal                                   EENT/Dental:  EENT/Dental Normal           Cardiovascular:  Cardiovascular Normal                                              Pulmonary:  Pulmonary Normal                       Renal/:  Renal/ Normal                 Musculoskeletal:  Musculoskeletal Normal                Neurological:  Neurology Normal                                      Endocrine:  Endocrine Normal                Physical Exam  General: Well nourished, Cooperative, Alert and Oriented    Airway:  Mallampati: III   Mouth Opening: Normal  TM Distance: Normal  Neck ROM: Normal ROM  Pre-Existing Airway: Oral Endotracheal tube    Dental:  Intact    Chest/Lungs:  Normal Respiratory Rate    Heart:  Rhythm: Regular Rhythm        Anesthesia Plan  Type of Anesthesia, risks & benefits discussed:    Anesthesia Type: Gen ETT, CSE, Spinal, Epidural  Intra-op Monitoring Plan: Standard ASA Monitors  Post Op Pain Control Plan: multimodal analgesia, IV/PO Opioids PRN, intrathecal opioid and epidural analgesia  Induction:  IV and rapid sequence  Airway Plan: Video, Post-Induction  Informed Consent: Informed consent signed with the Patient and all parties understand the risks and agree with anesthesia plan.  All questions answered.   ASA Score: 2  Day of Surgery Review of History & Physical: H&P Update referred to the surgeon/provider.    Ready For Surgery From Anesthesia Perspective.     .

## 2024-11-26 LAB
BASOPHILS # BLD AUTO: 0.03 K/UL (ref 0–0.2)
BASOPHILS NFR BLD: 0.2 % (ref 0–1.9)
DIFFERENTIAL METHOD BLD: ABNORMAL
EOSINOPHIL # BLD AUTO: 0 K/UL (ref 0–0.5)
EOSINOPHIL NFR BLD: 0.1 % (ref 0–8)
ERYTHROCYTE [DISTWIDTH] IN BLOOD BY AUTOMATED COUNT: 13.3 % (ref 11.5–14.5)
HCT VFR BLD AUTO: 35 % (ref 37–48.5)
HGB BLD-MCNC: 11.9 G/DL (ref 12–16)
IMM GRANULOCYTES # BLD AUTO: 0.04 K/UL (ref 0–0.04)
IMM GRANULOCYTES NFR BLD AUTO: 0.3 % (ref 0–0.5)
LYMPHOCYTES # BLD AUTO: 1.1 K/UL (ref 1–4.8)
LYMPHOCYTES NFR BLD: 8 % (ref 18–48)
MCH RBC QN AUTO: 32.3 PG (ref 27–31)
MCHC RBC AUTO-ENTMCNC: 34 G/DL (ref 32–36)
MCV RBC AUTO: 95 FL (ref 82–98)
MONOCYTES # BLD AUTO: 0.5 K/UL (ref 0.3–1)
MONOCYTES NFR BLD: 3.7 % (ref 4–15)
NEUTROPHILS # BLD AUTO: 12.3 K/UL (ref 1.8–7.7)
NEUTROPHILS NFR BLD: 87.7 % (ref 38–73)
NRBC BLD-RTO: 0 /100 WBC
PLATELET # BLD AUTO: 208 K/UL (ref 150–450)
PMV BLD AUTO: 11 FL (ref 9.2–12.9)
RBC # BLD AUTO: 3.68 M/UL (ref 4–5.4)
WBC # BLD AUTO: 14.04 K/UL (ref 3.9–12.7)

## 2024-11-26 PROCEDURE — 85025 COMPLETE CBC W/AUTO DIFF WBC: CPT | Performed by: OBSTETRICS & GYNECOLOGY

## 2024-11-26 PROCEDURE — 36415 COLL VENOUS BLD VENIPUNCTURE: CPT | Performed by: OBSTETRICS & GYNECOLOGY

## 2024-11-26 PROCEDURE — 11000001 HC ACUTE MED/SURG PRIVATE ROOM

## 2024-11-26 PROCEDURE — 25000003 PHARM REV CODE 250

## 2024-11-26 PROCEDURE — 99024 POSTOP FOLLOW-UP VISIT: CPT | Mod: ,,, | Performed by: OBSTETRICS & GYNECOLOGY

## 2024-11-26 PROCEDURE — 63600175 PHARM REV CODE 636 W HCPCS

## 2024-11-26 RX ORDER — DIPHENOXYLATE HYDROCHLORIDE AND ATROPINE SULFATE 2.5; .025 MG/1; MG/1
1 TABLET ORAL 4 TIMES DAILY PRN
Status: DISCONTINUED | OUTPATIENT
Start: 2024-11-26 | End: 2024-11-27 | Stop reason: HOSPADM

## 2024-11-26 RX ORDER — METRONIDAZOLE 500 MG/1
500 TABLET ORAL EVERY 12 HOURS
Status: DISCONTINUED | OUTPATIENT
Start: 2024-11-26 | End: 2024-11-27 | Stop reason: HOSPADM

## 2024-11-26 RX ORDER — LANOLIN ALCOHOL/MO/W.PET/CERES
1 CREAM (GRAM) TOPICAL DAILY
Status: DISCONTINUED | OUTPATIENT
Start: 2024-11-26 | End: 2024-11-27 | Stop reason: HOSPADM

## 2024-11-26 RX ADMIN — ACETAMINOPHEN 650 MG: 325 TABLET, FILM COATED ORAL at 07:11

## 2024-11-26 RX ADMIN — ACETAMINOPHEN 650 MG: 325 TABLET, FILM COATED ORAL at 03:11

## 2024-11-26 RX ADMIN — FERROUS SULFATE TAB 325 MG (65 MG ELEMENTAL FE) 1 EACH: 325 (65 FE) TAB at 08:11

## 2024-11-26 RX ADMIN — IBUPROFEN 800 MG: 400 TABLET ORAL at 07:11

## 2024-11-26 RX ADMIN — DIPHENOXYLATE HYDROCHLORIDE AND ATROPINE SULFATE 1 TABLET: 2.5; .025 TABLET ORAL at 09:11

## 2024-11-26 RX ADMIN — PRENATAL VIT W/ FE FUMARATE-FA TAB 27-0.8 MG 1 TABLET: 27-0.8 TAB at 08:11

## 2024-11-26 RX ADMIN — METRONIDAZOLE 500 MG: 500 TABLET ORAL at 10:11

## 2024-11-26 RX ADMIN — ACETAMINOPHEN 650 MG: 325 TABLET, FILM COATED ORAL at 02:11

## 2024-11-26 RX ADMIN — ACETAMINOPHEN 650 MG: 325 TABLET, FILM COATED ORAL at 08:11

## 2024-11-26 RX ADMIN — SIMETHICONE 80 MG: 80 TABLET, CHEWABLE ORAL at 03:11

## 2024-11-26 RX ADMIN — OXYCODONE HYDROCHLORIDE 5 MG: 5 TABLET ORAL at 08:11

## 2024-11-26 RX ADMIN — KETOROLAC TROMETHAMINE 30 MG: 30 INJECTION, SOLUTION INTRAMUSCULAR at 03:11

## 2024-11-26 RX ADMIN — KETOROLAC TROMETHAMINE 30 MG: 30 INJECTION, SOLUTION INTRAMUSCULAR at 08:11

## 2024-11-26 RX ADMIN — KETOROLAC TROMETHAMINE 30 MG: 30 INJECTION, SOLUTION INTRAMUSCULAR at 02:11

## 2024-11-26 RX ADMIN — DOCUSATE SODIUM 200 MG: 100 CAPSULE, LIQUID FILLED ORAL at 08:11

## 2024-11-26 RX ADMIN — METRONIDAZOLE 500 MG: 500 TABLET ORAL at 07:11

## 2024-11-26 NOTE — DISCHARGE INSTRUCTIONS
Community Resources for Breastfeeding Mothers:   Hospital Breastfeeding Centers/ Lactation Consultants:   [facility]........................................................................................[phone]  Outpatient Lactation Consults               [phone]   [facility]....................................................[phone]   [facility]..........................................................[phone]   [facility].................................................[phone]   [facility].......................................................[phone]   [facility].................................[phone]   [facility].......................................[phone]   [facility]..................................................[phone]   [facility].....................................................................[phone]      [facility]AP[facility] (Poison Control)...........[phone]  PoisonHelp.org   Free medical advice 24/7 through the Poison Help Line and the online tool      Online Resources:   International Breastfeeding Pace ...............................................................................ibconline.ca   Dr Daniel Chapman online resource provides videos, articles, and information sheets.     Coeffective...............................................................................................................coeffective.com   Download the free mobile mary kay to help get off to a great start with breastfeeding.   Droplet.....................................................................................................................Zyga.com   Global  Health Media...........................................................................................angelcam.org   Videos that teach and empower mothers and caregivers   Infant [facility].............................................................................[phone]      [employer]   Provides up to date information for medication use by moms during pregnancy and while breastfeeding.   Madie Jesus....................................................................................................................kellymom.com   Provides online information on breastfeeding and parenting      La Leche League........................................................................................ lllalmsla.org   /   llli.org   Mother-to-mother support groups with education, information support, and encouragement    Work and Pump........................................................................................... EcoSynthetix.com   Information about breastfeeding for working moms     [place] Resources:   [place] Breastfeeding Coalition............................... [phone]    [facility]   Find local breastfeeding support   [place] Breastfeeding Support............................................................ LaBreastfeedingSport.org   Zip code search of breastfeeding resources in your area   Partners for Healthy Babies............................................................2-012-541-2528   1400259rkau.org   Connects [place] moms and their families to health and pregnancy resources.  24/7   WIC (Women, Infant, Children)......................................................... [phone]   ldh.la.gov/WIC   Download the Ecomsual mary kay, get code from WIC office    [facility] Resources:     Baby Cafe............................................................................................................. babycafeusa.org   Free, drop in, informal  breastfeeding support groups offering professional lactation care and intervention.    [facility]/ [place] [facility]....................................... birthmarkHZO.com   Infant feeding drop in clinics, Lactation services, support groups, education programs   [facility]afe [facility]...............................................[phone]   Lumeta.com/groups/[facility]   Free breastfeeding support group for families of color   Mothers Milk Bank [facility]....................................................[phone]                                                             LabDoorsZUtA Labs.Stio/services/mothers-milk-bank-[facility]   GAUDENCIO Nesting..................................................................................[phone] nolanesting.com   In person and virtual support for families through pregnancy, birth, and early parenthood.       [facility] [place]- Dr. Елена Chaves.......................[phone]   [address] [place] [address]                                  [email or web address]   [email or web address]   [facility] Lactation [facility], [facility] (Josi Silverman RN, IBCLC) ............................[phone] [email or web address]    Healthy Start [place].....................................[phone] ([place])  [phone] ([place])   [facility].gov/health-department/healthy-start   Serves women of childbearing age and addresses issues for pregnant women and their children from birth  to age two.          La Leche League- [place]............................. Perosphere.Aware Labs/ Lumeta.Aware Labs/ Volas Entertainmentsteven   In person and virtual mother to mother support groups with education, information support and   encouragement to women who want to breastfeed      [place] Resources:   Breastfeeding Resources- [facility]  Health.....msdh.ms.gov (under womens services)   Find resources and info about planning for breastfeeding, its benefits, and help with breastfeeding  s uccessfully.    [facility] Cho[facility]- [place]....................................... KeriCure   [phone]   [address] [place], [place]. Call or text 24/7   [facility].......................................................Minicom Digital Signageastbreastfeedingcenter.Jayride.com   [facility] Lactation Consultants sere [facility], including [facility],   [facility], and surrounding areas.    [place] Breastfeeding Coalition...............................................................................msbfc.org   Promotes and supports breastfeeding with families, health providers, and communities.   [facility] breastfeeding Coalition.....................................................................smbfc.org   Find breastfeeding resources and support groups in your area.    WIC Nutrition Program- [facility] of Health.................................... ms.gov

## 2024-11-26 NOTE — ANESTHESIA POSTPROCEDURE EVALUATION
Anesthesia Post Evaluation    Patient: Tamara Aldana    Procedure(s) Performed: Procedure(s) (LRB):   SECTION (N/A)    Final Anesthesia Type: spinal      Patient location during evaluation: floor  Patient participation: Yes- Able to Participate  Level of consciousness: awake and alert  Post-procedure vital signs: reviewed and stable  Pain management: adequate  Airway patency: patent    PONV status at discharge: No PONV  Anesthetic complications: no      Cardiovascular status: stable  Respiratory status: unassisted, spontaneous ventilation and room air  Hydration status: euvolemic  Follow-up not needed.              Vitals Value Taken Time   /57 24   Temp 37.1 °C (98.7 °F) 24   Pulse 78 24   Resp 18 24   SpO2 98 % 24         Event Time   Out of Recovery 22:50:00         Pain/Jack Score: Pain Rating Prior to Med Admin: 1 (2024  4:50 AM)

## 2024-11-26 NOTE — BRIEF OP NOTE
[facility] - Labor & Delivery  Brief Operative Note    SUMMARY     Surgery Date: 2024     Surgeons and Role:     * Megan Coronado MD - Primary     * Krista Cristina MD - Resident - Assisting     * Cate Velez MD - Resident - Assisting        Pre-op Diagnosis:  Previous  delivery affecting pregnancy, antepartum [O34.219]    Post-op Diagnosis:  Post-Op Diagnosis Codes:     * Previous  delivery affecting pregnancy, antepartum [O34.219]    Procedure(s) (LRB):   SECTION (N/A)    Anesthesia: Spinal/Epidural    Implants:  * No implants in log *    Operative Findings: Uncomplicated rLTCS. . Normal anatomy; normal appearing uterus Fallopian tubes and ovaries. Fetus delivered without difficulty. Surgicel applied to hysterotomy. Fascia closed with PDS. Skin, subQ and dermis closed in normal fashion.     Estimated Blood Loss: * No values recorded between 2024  7:20 PM and 2024  8:35 PM *    Estimated Blood Loss has been documented.         Specimens:   Specimen (24h ago, onward)      None            [id number]    Venita Cristina MD  PGY 4  Obstetrics and Gynecology

## 2024-11-26 NOTE — PROGRESS NOTES
POSTPARTUM PROGRESS NOTE    Subjective:     PPD/POD#: 1   Procedure: Repeat LTCS   EGA: 37w0d   N/V: No   F/C: No   Abd Pain: Mild, well-controlled with oral pain medication   Lochia: Mild   Voiding: No (rick out 0600)    Ambulating: Yes   Bowel fnc: Yes   Contraception: Declined      Objective:      Temp:  [97.6 °F (36.4 °C)-98.7 °F (37.1 °C)] 98.7 °F (37.1 °C)  Pulse:  [] 78  Resp:  [16-18] 18  SpO2:  [95 %-100 %] 98 %  BP: (107-138)/(55-98) 108/57    Abdomen: Soft, appropriately tender   Uterus: Firm, no fundal tenderness   Incision: Bandage in place without shadowing     Lab Review    Recent Labs   Lab 11/22/24  1517 11/25/24  1309    135*   K 3.9 3.8    108   CO2 17* 18*   BUN 7 6   CREATININE 0.6 0.7   GLU 95 101   PROT 6.2 6.1   BILITOT 0.3 0.3   ALKPHOS 110 111   ALT 12 14   AST 15 20       Recent Labs   Lab 11/22/24  1517 11/25/24  1309 11/26/24  0504   WBC 8.36 9.51 14.04*   HGB 12.8 12.7 11.9*   HCT 36.9* 36.1* 35.0*   MCV 93 93 95    229 208         I/O    Intake/Output Summary (Last 24 hours) at 11/26/2024 0630  Last data filed at 11/26/2024 0530  Gross per 24 hour   Intake --   Output 1795 ml   Net -1795 ml        Assessment and Plan:   Postpartum care:  - Patient doing well.  - Continue routine management and advances.    gHTN  - BP as above  - preE labs WNL  - Mag: not indicated  - Hypertensive agent not indicated at this time       Depression/Anxiety  - Mood stable  - Medications: not on any home medication   - Will need 1-2 week postpartum mood check     Jessie Mathews MD (Mary)   Obstetrics and Gynecology, PGY1

## 2024-11-26 NOTE — ANESTHESIA PROCEDURE NOTES
Spinal    Diagnosis: IUP  Patient location during procedure: OR  Start time: 11/25/2024 6:59 PM  Timeout: 11/25/2024 6:58 PM  End time: 11/25/2024 7:04 PM    Staffing  Authorizing Provider: Parish Luther MD  Performing Provider: Yvan Marinelli MD    Staffing  Performed by: Yvan Marinelli MD  Authorized by: Parish Luther MD    Preanesthetic Checklist  Completed: patient identified, IV checked, site marked, risks and benefits discussed, surgical consent, monitors and equipment checked, pre-op evaluation and timeout performed  Spinal Block  Patient position: sitting  Prep: ChloraPrep  Patient monitoring: cardiac monitor, heart rate, continuous pulse ox and frequent blood pressure checks  Approach: midline  Location: L3-4  Injection technique: single shot  CSF Fluid: clear free-flowing CSF  Needle  Needle type: pencil-tip   Needle gauge: 25 G  Needle length: 3.5 in  Additional Documentation: incremental injection and no paresthesia on injection  Needle localization: anatomical landmarks  Assessment  Sensory level: T4   Dermatomal levels determined by pinch or prick

## 2024-11-26 NOTE — LACTATION NOTE
11/26/24 1255   Breasts WDL   Breast WDL WDL   Breast Pumping   Breast Pumping double electric breast pump utilized   Breast Pumping Interventions post-feed pumping encouraged  (extra stimulation)   Maternal Feeding Assessment   Maternal Emotional State assist needed   Infant Positioning clutch/football;cross-cradle   Signs of Milk Transfer infant jaw motion present;audible swallow   Pain with Feeding no   Comfort Measures Before/During Feeding infant position adjusted;latch adjusted;maternal position adjusted   Latch Assistance yes   Reproductive Interventions   Breast Care: Breastfeeding open to air   Breastfeeding Assistance feeding cue recognition promoted;feeding on demand promoted;assisted with positioning;infant latch-on verified;feeding session observed;infant suck/swallow verified;support offered   Breastfeeding Support maternal rest encouraged;maternal nutrition promoted;maternal hydration promoted;lactation counseling provided;infant-mother separation minimized;encouragement provided;diary/feeding log utilized     Lactation rounds. Basic eduction reviewed. LC assisted pt with positioning herself and infant for optimal latch. Discussed near term infant feeding patterns. Encouraged use of breast compression and infant stimulation.

## 2024-11-26 NOTE — TRANSFER OF CARE
"Anesthesia Transfer of Care Note    Patient: Tamara Aldana    Procedure(s) Performed: Procedure(s) (LRB):   SECTION (N/A)    Patient location: PACU    Transport from OR: Transported from OR on room air with adequate spontaneous ventilation    Post pain: adequate analgesia    Post assessment: no apparent anesthetic complications    Post vital signs: stable    Level of consciousness: awake, alert and oriented    Complications: none    Transfer of care protocol was followed      Last vitals: Visit Vitals  /83   Pulse 86   Temp 36.7 °C (98 °F) (Oral)   Resp 16   Ht 5' 1" (1.549 m)   Wt 69.1 kg (152 lb 5.4 oz)   LMP 2024   SpO2 97%   Breastfeeding Unknown   BMI 28.78 kg/m²     "

## 2024-11-26 NOTE — L&D DELIVERY NOTE
[facility] - Labor & Delivery   Section   Operative Note    SUMMARY      Section Procedure Note    Procedure Date: 2024     Procedure:   1. Repeat  Section via Pfannenstiel skin incision    Indications:   1.  History of C/Sx1  2. gHTN    Pre-operative Diagnosis:   1. IUP at 37w0d pregnancy  2. gHTN  3. FGR  4. Anxiety/Depression    Post-operative Diagnosis:   1. S/p rLTCS  2. gHTN  3. FGR  4. Anxiety/Depression    Surgeon: Megan Coronado MD     Assistants: Krista Cristina MD, PGY-4; Cate Velez MD, PGY-1    Anesthesia: Spinal anesthesia    Findings:    1. Single viable  female infant, with APGARS 8/9, weight 2370g.   2. Normal appearing uterus, ovaries, and fallopian tubes. Previously documented bicornuate uterus. Uterus did not appear bicornuate. No septum palpated on uterine sweep.   3. Normal placenta.   4. Excellent hemostasis noted following closure of each tissue layer.    Estimated Blood Loss:  770 mL           Total IV Fluids: See anesthesia report.      UOP: See anesthesia report.     Specimens: Placenta, sent to path     PreOp CBC:   Lab Results   Component Value Date    WBC 9.51 2024    HGB 12.7 2024    HCT 36.1 (L) 2024    MCV 93 2024     2024                     Complications:  None; patient tolerated the procedure well.           Disposition: PACU - hemodynamically stable.           Condition: stable    Procedure Details:   The patient was seen in the holding room. The risks, benefits, complications, treatment options, and expected outcomes were discussed with the patient.  The patient concurred with the proposed plan, giving informed consent. The patient was taken to operating room, identified as Tamara Aldana and the procedure verified as  delivery. A time out was held and the above information confirmed.    After induction of anesthesia, the patient was prepped and draped in the usual sterile manner while  placed in a dorsal supine position with a left lateral tilt.  A rick catheter was also placed per nursing. Preoperative antibiotics were administered and an allis test was performed yielding adequate anesthesia.  A Pfannenstiel incision was made and carried down through the subcutaneous tissue to the fascia. Fascial incision was made and extended transversely. The fascia was grasped with Kocher clamps and  from the underlying rectus tissue superiorly and inferiorly. The peritoneum was identified, found to be free of adherent bowel and entered sharply. Peritoneal incision was extended longitudinally. The vesico-uterine peritoneum was identified and bladder blade was inserted. The vesico-uterine peritoneum was incised transversely and the bladder flap was bluntly freed from the lower uterine segment. The bladder blade was reinserted to keep the bladder out of the operative field. A low transverse uterine incision was made with knife. The amniotic sac was ruptured bluntly and the infant was noted to be in cephalic position. The fetal head was brought to the incision and elevated out of the pelvis. The patient delivered a single viable female infant without difficulty.  Infant weighed 2370 grams with Apgar scores of 8/9 at one and five minutes respectively. After the umbilical cord was clamped and cut cord blood was obtained for evaluation. The placenta was removed intact and appeared normal and was sent to pathology. The uterus was exteriorized. The uterine outline, tubes and ovaries appeared normal. Of note, the uterus did not appear bicornuate and no uterine septum was palpated on uterine sweep. The uterine incision was closed with running locked sutures of 1 chromic. Additional figure of eight sutures of 1 chromic and Surgicel were used achieve hemostasis. The uterus was returned to the abdominal cavity. Incision was reinspected and good hemostasis was noted. The abdominal cavity was swept to remove clots.  "The fascia was then reapproximated with running sutures of 1-PDS. The subcutaneous fat and skin was reapproximated with 0 vicryl and 4-0 monocryl respectively. The wound was covered with a pressure dressing.     Instrument, sponge, and needle counts were correct prior the abdominal closure and at the conclusion of the case.    Pt tolerated procedure well and was in stable condition after the procedure.    **NOTE: This patient is a candidate for trial of labor after  delivery**    Cate Velez MD  Obstetrics & Gynecology, PGY-1        Delivery Information for Rui Aldana    Birth information:  YOB: 2023   Time of birth: 7:32 PM   Sex: female   Head Delivery Date/Time: 2024  7:32 PM   Delivery type: , Low Transverse   Gestational Age: 37w0d       Delivery Providers    Delivering clinician: Megan Coronado MD   Provider Role    Krista Cristina MD Resident    Cate Velez MD Resident    Chelo Vieyra RN Circulator    Ynenifer Uriarte, ST Surgical Tech    Princess Tijerina RN Registered Nurse    Corin Daniels RN Charge Nurse              Measurements    Weight: 2370 g  Weight (lbs): 5 lb 3.6 oz  Length: 44.5 cm  Length (in): 17.5"  Head circumference: 31 cm  Chest circumference: 29 cm         Apgars    Living status: Living  Apgar Component Scores:  1 min.:  5 min.:  10 min.:  15 min.:  20 min.:    Skin color:  1  1       Heart rate:  2  2       Reflex irritability:  2  2       Muscle tone:  1  2       Respiratory effort:  2  2       Total:  8  9       Apgars assigned by: FLORENCE TIJERINA RN         Operative Delivery    Forceps attempted?: No  Vacuum extractor attempted?: No         Shoulder Dystocia    Shoulder dystocia present?: No           Presentation    Presentation: Vertex  Position: Right Occiput Anterior           Interventions/Resuscitation    Method: Bulb Suctioning, Tactile Stimulation       Cord    Vessels: 3 vessels  Complications: Nuchal  Nuchal " Intervention: reduced  Nuchal Cord Description: tight nuchal cord  Number of Loops: 2  Delayed Cord Clamping?: Yes  Cord Clamped Date/Time: 2024  7:33 PM  Cord Blood Disposition: Discarded  Gases Sent?: No  Stem Cell Collection (by MD): No       Placenta    Placenta delivery date/time: 2024  Placenta removal: Manual removal  Placenta appearance: Intact  Placenta disposition: Pathology, Donation           Labor Events:       labor: No     Labor Onset Date/Time:         Dilation Complete Date/Time:         Start Pushing Date/Time:         Start Pushing Date/Time:       Rupture Date/Time:            Rupture type:          Fluid Amount:       Fluid Color:                steroids: None     Antibiotics given for GBS: No     Induction:       Indications for induction:        Augmentation:       Indications for augmentation:       Labor complications: None     Additional complications: Gestational Hypertension;Fetal Growth Restriction Antepartum        Cervical ripening:                     Delivery:      Episiotomy:       Indication for Episiotomy:       Perineal Lacerations:   Repaired:      Periurethral Laceration:   Repaired:     Labial Laceration:   Repaired:     Sulcus Laceration:   Repaired:     Vaginal Laceration:   Repaired:     Cervical Laceration:   Repaired:     Repair suture:       Repair # of packets:       Last Value - EBL - Nursing (mL):       Sum - EBL - Nursing (mL): 0     Last Value - EBL - Anesthesia (mL):      Calculated QBL (mL): 770     Running total QBL (mL): 770     Vaginal Sweep Performed:       Surgicount Correct:       Vaginal Packing:   Quantity:       Other providers:       Anesthesia    Method: Spinal          Details (if applicable):  Trial of Labor No    Categorization: Repeat    Priority: Routine   Indications for : Prior Uterine Surgery   Incision Type: low transverse     Additional  information:  Forceps:    Vacuum:     Breech:    Observed anomalies    Other (Comments):         Cate Velez MD  Obstetrics & Gynecology, PGY-1

## 2024-11-26 NOTE — PLAN OF CARE
Patient safety maintained, side rails up x2, bed low and locked position. Pain well controlled with scheduled pain medication. Fundus midline, firm, with light lochia. Patient responding to infant cues.  Hydrocolloid dressing clean, dry, and intact.  Significant other at bedside and assisting in patient's care. Repeat CBC drawn and resulted. Admit papers and s&s of Pre-E and PPH reviewed over with pt. Pt states understanding. Rubi and SCD removed at 0530.

## 2024-11-27 ENCOUNTER — TELEPHONE (OUTPATIENT)
Dept: OBSTETRICS AND GYNECOLOGY | Facility: CLINIC | Age: 34
End: 2024-11-27
Payer: COMMERCIAL

## 2024-11-27 ENCOUNTER — PATIENT MESSAGE (OUTPATIENT)
Dept: OBSTETRICS AND GYNECOLOGY | Facility: CLINIC | Age: 34
End: 2024-11-27
Payer: COMMERCIAL

## 2024-11-27 VITALS
RESPIRATION RATE: 18 BRPM | TEMPERATURE: 98 F | HEART RATE: 81 BPM | DIASTOLIC BLOOD PRESSURE: 57 MMHG | HEIGHT: 61 IN | SYSTOLIC BLOOD PRESSURE: 106 MMHG | OXYGEN SATURATION: 96 % | WEIGHT: 152.31 LBS | BODY MASS INDEX: 28.76 KG/M2

## 2024-11-27 PROBLEM — O13.9 GESTATIONAL HYPERTENSION: Status: ACTIVE | Noted: 2024-11-27

## 2024-11-27 PROCEDURE — 25000003 PHARM REV CODE 250

## 2024-11-27 RX ORDER — DEXTROMETHORPHAN HYDROBROMIDE, GUAIFENESIN 5; 100 MG/5ML; MG/5ML
650 LIQUID ORAL EVERY 6 HOURS PRN
Qty: 60 TABLET | Refills: 0 | Status: SHIPPED | OUTPATIENT
Start: 2024-11-27

## 2024-11-27 RX ORDER — IBUPROFEN 600 MG/1
600 TABLET ORAL EVERY 6 HOURS PRN
Qty: 60 TABLET | Refills: 0 | Status: SHIPPED | OUTPATIENT
Start: 2024-11-27

## 2024-11-27 RX ORDER — OXYCODONE HYDROCHLORIDE 5 MG/1
5 TABLET ORAL EVERY 4 HOURS PRN
Qty: 20 TABLET | Refills: 0 | Status: SHIPPED | OUTPATIENT
Start: 2024-11-27

## 2024-11-27 RX ORDER — AMOXICILLIN 250 MG
1 CAPSULE ORAL DAILY PRN
Qty: 30 TABLET | Refills: 0 | Status: SHIPPED | OUTPATIENT
Start: 2024-11-27

## 2024-11-27 RX ADMIN — ACETAMINOPHEN 650 MG: 325 TABLET, FILM COATED ORAL at 03:11

## 2024-11-27 RX ADMIN — OXYCODONE HYDROCHLORIDE 10 MG: 10 TABLET ORAL at 02:11

## 2024-11-27 RX ADMIN — DOCUSATE SODIUM 200 MG: 100 CAPSULE, LIQUID FILLED ORAL at 08:11

## 2024-11-27 RX ADMIN — METRONIDAZOLE 500 MG: 500 TABLET ORAL at 08:11

## 2024-11-27 RX ADMIN — IBUPROFEN 800 MG: 400 TABLET ORAL at 03:11

## 2024-11-27 RX ADMIN — PRENATAL VIT W/ FE FUMARATE-FA TAB 27-0.8 MG 1 TABLET: 27-0.8 TAB at 08:11

## 2024-11-27 RX ADMIN — FERROUS SULFATE TAB 325 MG (65 MG ELEMENTAL FE) 1 EACH: 325 (65 FE) TAB at 08:11

## 2024-11-27 RX ADMIN — ACETAMINOPHEN 650 MG: 325 TABLET, FILM COATED ORAL at 08:11

## 2024-11-27 NOTE — DISCHARGE SUMMARY
Delivery Discharge Summary  Obstetrics      Primary OB Clinician: Itz Shirley MD      Admission date: 2024  Discharge date: 2024    Disposition: To home, self care    Discharge Diagnosis List:      Patient Active Problem List   Diagnosis    Attention-deficit hyperactivity disorder, unspecified type    Previous  delivery affecting pregnancy, antepartum    Depression    Anxiety    Bicornuate uterus affecting pregnancy, antepartum    History of pre-eclampsia in prior pregnancy, currently pregnant    Single delivery by  section    Fetal growth restriction antepartum    Gestational hypertension       Procedure: repeat  delivery    Hospital Course:  Tamara Aldana is a 34 y.o. now , POD #2 who was admitted on 2024 at 37w0d for repeat  delivery due to gHTN. Patient was subsequently admitted to labor and delivery unit with signed consents.      was performed without complications. Please see delivery note for further details.     Her postpartum course was uncomplicated. On discharge day, patient's pain is controlled with oral pain medications. Pt is tolerating ambulation without SOB or CP, and regular diet without N/V. Reports lochia is mild. Denies any HA, vision changes, F/C, LE swelling. Denies any breast pain/soreness.    Pt in stable condition and ready for discharge. She has been instructed to start and/or continue medications and follow up with her obstetrics provider as listed below.    Pertinent studies:  CBC  Recent Labs   Lab 24  1517 24  1309 24  0504   WBC 8.36 9.51 14.04*   HGB 12.8 12.7 11.9*   HCT 36.9* 36.1* 35.0*   MCV 93 93 95    229 208          Immunization History   Administered Date(s) Administered    COVID-19, MRNA, LN-S, PF (Pfizer) (Purple Cap) 2021, 2021, 2021    DTaP 1991, 1991, 1991, 1992, 1996    HIB 1991, 1991, 1991,  "1992    HPV Quadrivalent 2007, 2007, 2008    Hepatitis B 2013, 2013, 10/15/2013, 10/15/2013    Hepatitis B, Adult 2014    Hepatitis B, Pediatric/Adolescent 1993, 2003, 2003, 2003    IPV 1991, 1991, 1991, 1996    Influenza 2020    Influenza - Quadrivalent - PF *Preferred* (6 months and older) 10/22/2018, 10/03/2019, 2020, 10/18/2021, 10/20/2022, 10/10/2023    Influenza - Trivalent - Afluria, Fluzone MDV 2013    Influenza - Trivalent - Fluarix, Flulaval, Fluzone, Afluria - PF 2015, 2016, 10/14/2024    MMR 1992, 1996, 2018, 2018    Meningococcal Conjugate (MCV4P) 2007    PPD Test 2011, 2011, 2014    Rsv, Bivalent, Rsvpref (Abrysvo) 2024    Tdap 2016, 2019, 2021, 10/17/2024    Tetanus 2006        Delivery:    Episiotomy:     Lacerations:     Repair suture:     Repair # of packets:     Blood loss (ml):       Birth information:  YOB: 2023   Time of birth: 7:32 PM   Sex: female   Delivery type: , Low Transverse   Gestational Age: 37w0d     Measurements    Weight: 2370 g  Weight (lbs): 5 lb 3.6 oz  Length: 44.5 cm  Length (in): 17.5"  Head circumference: 31 cm  Chest circumference: 29 cm         Delivery Clinician: Delivery Providers    Delivering clinician: Megan Coronado MD   Provider Role    Krista Cristina MD Resident    Cate Velez MD Resident    Chelo Vieyra RN Circulator    Yennifer Uriarte, ST Surgical Tech    Doll, Dian, RN Registered Nurse    Corin Daniels, RN Charge Nurse             Additional  information:  Forceps:    Vacuum:    Breech:    Observed anomalies      Living?:     Apgars    Living status: Living  Apgar Component Scores:  1 min.:  5 min.:  10 min.:  15 min.:  20 min.:    Skin color:  1  1       Heart rate:  2  2       Reflex irritability:  2  2       Muscle " tone:  1  2       Respiratory effort:  2  2       Total:  8  9       Apgars assigned by: FLORENCE TIJERINA RN         Placenta: Delivered:       appearance    Patient Instructions:   Current Discharge Medication List        START taking these medications    Details   acetaminophen (TYLENOL) 650 MG TbSR Take 1 tablet (650 mg total) by mouth every 6 (six) hours as needed (pain).  Qty: 60 tablet, Refills: 0      ibuprofen (ADVIL,MOTRIN) 600 MG tablet Take 1 tablet (600 mg total) by mouth every 6 (six) hours as needed.  Qty: 60 tablet, Refills: 0      oxyCODONE (ROXICODONE) 5 MG immediate release tablet Take 1 tablet (5 mg total) by mouth every 4 (four) hours as needed for Pain.  Qty: 20 tablet, Refills: 0    Comments: Quantity prescribed more than 7 day supply? No  Associated Diagnoses: Status post repeat low transverse  section      senna-docusate 8.6-50 mg (PERICOLACE) 8.6-50 mg per tablet Take 1 tablet by mouth daily as needed.  Qty: 30 tablet, Refills: 0           CONTINUE these medications which have NOT CHANGED    Details   acyclovir 5% (ZOVIRAX) 5 % ointment Apply topically every 3 (three) hours.  Qty: 30 g, Refills: 2      cetirizine (ZYRTEC) 10 MG tablet Take 10 mg by mouth once daily.      ferrous sulfate, dried (SLOW FE) 160 mg (50 mg iron) TbSR Take 160 mg by mouth once daily.      metroNIDAZOLE (FLAGYL) 500 MG tablet Take 1 tablet (500 mg total) by mouth every 12 (twelve) hours. for 7 days  Qty: 14 tablet, Refills: 0      valACYclovir (VALTREX) 1000 MG tablet Take 2 tablets (2,000 mg total) by mouth 2 (two) times daily as needed (for 1 day as needed for cold sore).  Qty: 60 tablet, Refills: 1           STOP taking these medications       aspirin (ECOTRIN) 81 MG EC tablet Comments:   Reason for Stopping:               Discharge Procedure Orders   Diet Adult Regular     Pelvic Rest   Order Comments: Pelvic rest until follow up visit. Nothing in vagina -no sex, tampons, douching, etc.     Notify your health care  provider if you experience any of the following:   Order Comments: - Heavy vaginal bleeding soaking through more than 2 pads/hour for > 2 hours  - Severe abdominal pain  - Headache not relieved with Tylenol  - Vision changes  - Chest pain or shortness or breath     Notify your health care provider if you experience any of the following:  increased confusion or weakness     Notify your health care provider if you experience any of the following:  persistent dizziness, light-headedness, or visual disturbances     Notify your health care provider if you experience any of the following:  severe persistent headache     Notify your health care provider if you experience any of the following:  difficulty breathing or increased cough     Notify your health care provider if you experience any of the following:  redness, tenderness, or signs of infection (pain, swelling, redness, odor or green/yellow discharge around incision site)     Notify your health care provider if you experience any of the following:  severe uncontrolled pain     Notify your health care provider if you experience any of the following:  persistent nausea and vomiting or diarrhea     Notify your health care provider if you experience any of the following:  temperature >100.4        Follow-up Information       Itz Shirley MD Follow up in 1 week(s).    Specialties: Obstetrics, Obstetrics and Gynecology  Why: BP check on [facility]  Contact information:  [address] [id number] [phone]               Itz Shirley MD Follow up in 6 week(s).    Specialties: Obstetrics, Obstetrics and Gynecology  Why: Postpartum Visit  Contact information:  [address] [id number] [phone]                              Catie Powell MD  OBGYN, PGY-4

## 2024-11-27 NOTE — PROGRESS NOTES
POSTPARTUM PROGRESS NOTE    Subjective:     PPD/POD#: 2   Procedure: Repeat LTCS   EGA: 37w0d   N/V: No   F/C: No   Abd Pain: Mild, well-controlled with oral pain medication   Lochia: Mild   Voiding: Yes   Ambulating: Yes   Bowel fnc: Yes   Contraception: Declined      Patient denies HA, vision changes, CP, SOB, and RUQ pain.     Objective:      Temp:  [97.7 °F (36.5 °C)-98.5 °F (36.9 °C)] 97.9 °F (36.6 °C)  Pulse:  [74-88] 88  Resp:  [16-18] 17  SpO2:  [95 %-98 %] 96 %  BP: ()/(50-74) 112/74    Abdomen: Soft, appropriately tender   Uterus: Firm, no fundal tenderness   Incision: Bandage in place without shadowing     Lab Review    Recent Labs   Lab 11/22/24  1517 11/25/24  1309    135*   K 3.9 3.8    108   CO2 17* 18*   BUN 7 6   CREATININE 0.6 0.7   GLU 95 101   PROT 6.2 6.1   BILITOT 0.3 0.3   ALKPHOS 110 111   ALT 12 14   AST 15 20       Recent Labs   Lab 11/22/24  1517 11/25/24  1309 11/26/24  0504   WBC 8.36 9.51 14.04*   HGB 12.8 12.7 11.9*   HCT 36.9* 36.1* 35.0*   MCV 93 93 95    229 208         I/O    Intake/Output Summary (Last 24 hours) at 11/27/2024 0638  Last data filed at 11/26/2024 1400  Gross per 24 hour   Intake --   Output 850 ml   Net -850 ml        Assessment and Plan:   Postpartum care:  - Patient doing well.  - Continue routine management and advances.    gHTN  - BP as above  - preE labs WNL  - Mag: not indicated  - Hypertensive agent not indicated at this time       Depression/Anxiety  - Mood stable  - Medications: not on any home medication   - Will need 1-2 week postpartum mood check     Shelli Hess MD  OB/GYN PGY-2

## 2024-11-27 NOTE — TELEPHONE ENCOUNTER
Spoke with  and the patient was being checked out of the hospital and could not talk. Will call back later

## 2024-11-27 NOTE — PLAN OF CARE
Patient safety maintained, side rails up x2, bed low and locked position. Pt ambulating and voiding independently.  Pain well controlled with scheduled and PRN pain medication. Fundus midline, firm, with light lochia. Patient responding to infant cues.  Hydrocolloid dressing dry, intact, with dried drainage. Significant other at bedside and assisting in patient's care.

## 2024-11-27 NOTE — LACTATION NOTE
11/27/24 0900   Breasts WDL   Breast WDL WDL   Maternal Feeding Assessment   Maternal Emotional State relaxed   Latch Assistance   (encouraged to call)   Reproductive Interventions   Breast Care: Breastfeeding open to air   Breastfeeding Assistance feeding on demand promoted;feeding cue recognition promoted   Breastfeeding Support maternal rest encouraged;maternal nutrition promoted;maternal hydration promoted;lactation counseling provided;infant-mother separation minimized;encouragement provided;diary/feeding log utilized     Lactation note: lactation discharge education reviewed.. Pt encouraged to call for latch assistance. Pt to pump post feeding for extra stimulation secondary to infant gestational age. Pt offering supplements post feeding as per her own desire. Using paced bottle feeding, slow flow nipple.

## 2024-11-27 NOTE — TELEPHONE ENCOUNTER
----- Message from RADHA Chowdhury sent at 11/27/2024 10:52 AM CST -----  Hi, can you please call ms hernandez to schedule a 2 week post partum mood/incision/well check w/dr jacobs. Thank you. NOTE: pt was asked to check bp daily w/connect mom

## 2024-11-29 ENCOUNTER — PATIENT MESSAGE (OUTPATIENT)
Dept: OBSTETRICS AND GYNECOLOGY | Facility: CLINIC | Age: 34
End: 2024-11-29
Payer: COMMERCIAL

## 2024-11-29 ENCOUNTER — HOSPITAL ENCOUNTER (EMERGENCY)
Facility: OTHER | Age: 34
Discharge: HOME | End: 2024-11-29
Attending: OBSTETRICS & GYNECOLOGY
Payer: COMMERCIAL

## 2024-11-29 VITALS
SYSTOLIC BLOOD PRESSURE: 115 MMHG | TEMPERATURE: 98 F | OXYGEN SATURATION: 98 % | DIASTOLIC BLOOD PRESSURE: 86 MMHG | RESPIRATION RATE: 18 BRPM | HEART RATE: 67 BPM

## 2024-11-29 DIAGNOSIS — R51.9 POSTPARTUM HEADACHE: Primary | ICD-10-CM

## 2024-11-29 DIAGNOSIS — R03.0 ELEVATED BLOOD PRESSURE READING: ICD-10-CM

## 2024-11-29 LAB
BACTERIA SPEC AEROBE CULT: NORMAL
FINAL PATHOLOGIC DIAGNOSIS: NORMAL
GROSS: NORMAL
Lab: NORMAL

## 2024-11-29 PROCEDURE — 99283 EMERGENCY DEPT VISIT LOW MDM: CPT

## 2024-11-29 PROCEDURE — 25000003 PHARM REV CODE 250

## 2024-11-29 PROCEDURE — 99284 EMERGENCY DEPT VISIT MOD MDM: CPT | Mod: ,,, | Performed by: OBSTETRICS & GYNECOLOGY

## 2024-11-29 RX ORDER — METOCLOPRAMIDE 5 MG/1
10 TABLET ORAL ONCE
Status: COMPLETED | OUTPATIENT
Start: 2024-11-29 | End: 2024-11-29

## 2024-11-29 RX ORDER — KETOROLAC TROMETHAMINE 10 MG/1
10 TABLET, FILM COATED ORAL ONCE
Status: COMPLETED | OUTPATIENT
Start: 2024-11-29 | End: 2024-11-29

## 2024-11-29 RX ORDER — DIPHENHYDRAMINE HCL 25 MG
25 CAPSULE ORAL ONCE
Status: COMPLETED | OUTPATIENT
Start: 2024-11-29 | End: 2024-11-29

## 2024-11-29 RX ADMIN — KETOROLAC TROMETHAMINE 10 MG: 10 TABLET, FILM COATED ORAL at 07:11

## 2024-11-29 RX ADMIN — DIPHENHYDRAMINE HYDROCHLORIDE 25 MG: 25 CAPSULE ORAL at 07:11

## 2024-11-29 RX ADMIN — METOCLOPRAMIDE 10 MG: 5 TABLET ORAL at 07:11

## 2024-11-30 ENCOUNTER — PATIENT MESSAGE (OUTPATIENT)
Dept: OBSTETRICS AND GYNECOLOGY | Facility: OTHER | Age: 34
End: 2024-11-30
Payer: COMMERCIAL

## 2024-11-30 NOTE — ED PROVIDER NOTES
Encounter Date: 2024       History     Chief Complaint   Patient presents with    Hypertension    Headache     H/o post partum Pre-E     Tamara Aldana is a 34 y.o. , POD#3 from [facility] [place] presenting for elevated Bps and headache.    Pt reports today with headache, 5/10 at back of head and down neck. Pt also endorses high blood pressures at this time at home, ranging in 140s/90s. Denies any scotomas, RUQ pain, dizziness, SOB, fevers, or chills. No other complaints at this time. Hx of PP htn with last pregnancy, treated with procardia 30. Hx of gHTN this pregnancy, not on medication management.             Review of patient's allergies indicates:   Allergen Reactions    Bactrim [sulfamethoxazole-trimethoprim] Itching     Past Medical History:   Diagnosis Date    ADD (attention deficit disorder)     Anxiety     History of panic attacks      Past Surgical History:   Procedure Laterality Date     SECTION N/A 2021    Procedure:  SECTION;  Surgeon: Julie R. Jeansonne, MD;  Location: [facility] L&D;  Service: OB/GYN;  Laterality: N/A;     SECTION N/A 2024    Procedure:  SECTION;  Surgeon: Megan Coronado MD;  Location: [facility] L&D;  Service: OB/GYN;  Laterality: N/A;    COLONOSCOPY N/A 2023    Procedure: COLONOSCOPY;  Surgeon: Brayan Ledezma MD;  Location: [facility];  Service: Endoscopy;  Laterality: N/A;    ESOPHAGOGASTRODUODENOSCOPY N/A 2023    Procedure: EGD (ESOPHAGOGASTRODUODENOSCOPY);  Surgeon: Brayan Ledezma MD;  Location: [facility];  Service: Endoscopy;  Laterality: N/A;    LASIK Bilateral     TONSILLECTOMY  2014    WISDOM TOOTH EXTRACTION       Family History   Problem Relation Name Age of Onset    Colon cancer Paternal Grandfather      Cancer Paternal Grandfather      Colon polyps Father      Hyperlipidemia Mother      Schizophrenia Mother      Breast cancer Neg Hx      Ovarian cancer Neg Hx      Melanoma Neg Hx       Uterine cancer Neg Hx      Cervical cancer Neg Hx       Social History     Tobacco Use    Smoking status: Never     Passive exposure: Never    Smokeless tobacco: Never   Substance Use Topics    Alcohol use: Not Currently    Drug use: No     Review of Systems   Constitutional:  Negative for chills and fever.   HENT:  Negative for congestion.    Eyes:  Negative for visual disturbance.   Respiratory:  Negative for chest tightness and shortness of breath.    Gastrointestinal:  Negative for constipation, diarrhea, nausea and vomiting.   Genitourinary:  Negative for dysuria.   Neurological:  Positive for headaches.       Physical Exam     Initial Vitals [11/29/24 1818]   BP Pulse Resp Temp SpO2   138/87 87 18 98.2 °F (36.8 °C) 98 %      MAP       --         Physical Exam    Vitals reviewed.  Constitutional: She appears well-developed and well-nourished. She is not diaphoretic. No distress.   HENT:   Head: Normocephalic and atraumatic.   Eyes: Conjunctivae and EOM are normal.   Neck:   Normal range of motion.  Cardiovascular:  Normal rate and regular rhythm.           Pulmonary/Chest: Breath sounds normal. No respiratory distress.   Abdominal: Abdomen is soft. She exhibits no distension. There is no abdominal tenderness.   Bandage with minimal shadowing There is no rebound.   Musculoskeletal:         General: Normal range of motion.      Cervical back: Normal range of motion.     Neurological: She is alert and oriented to person, place, and time.   Skin: Skin is warm and dry.   Psychiatric: She has a normal mood and affect. Thought content normal.         ED Course   Procedures  Labs Reviewed - No data to display       Imaging Results    None          Medications   diphenhydrAMINE capsule 25 mg (25 mg Oral Given 11/29/24 1901)   metoclopramide HCl tablet 10 mg (10 mg Oral Given 11/29/24 1901)   ketorolac tablet 10 mg (10 mg Oral Given 11/29/24 1958)     Medical Decision Making  Tamara Aldana is a 34 y.o.  , POD#3 from [facility] presenting for elevated Bps and headache.    Temp:  [98.2 °F (36.8 °C)] 98.2 °F (36.8 °C)  Pulse:  [87] 87  Resp:  [18] 18  SpO2:  [98 %] 98 %  BP: (138)/(87) 138/87    -VSS    Elevated BP / Headache   - pt reports to [facility] with elevated home bps (140s/90s) and headache 5/10, refractory to ibuprofen 600mg at 1700  - VSS   - s/p benadryl reglan   - s/p toradol 10mg   - 1 hr BP monitoring WNL  - Headache 5/10 > 2/10  -Pt deemed stable for discharge with strict return to ED precautions discussed at bedside     Jessie Mathews MD (Mary)   Obstetrics and Gynecology, PGY1    Risk  OTC drugs.  Prescription drug management.              Attending Attestation:   Physician Attestation Statement for Resident:  As the supervising MD   Physician Attestation Statement: I have personally seen and examined this patient.   I agree with the above history.  -:   As the supervising MD I agree with the above PE.     As the supervising MD I agree with the above treatment, course, plan, and disposition.   -:     BP normal in [facility].  Pre-E labs not indicated.  HA treated with benadryl and reglan, improved with toradol.  HA c/w tension headache.  Agree with discharge to home.    Gita Bonilla MD,NIKA  Date and Time: 2024 5:59 AM  OB Hospitalist    I was personally present during the critical portions of the procedure(s) performed by the resident and was immediately available in the ED to provide services and assistance as needed during the entire procedure.   I have reviewed the following: old records at this facility.                                       Clinical Impression:  Final diagnoses:  [O90.89, R51.9] Postpartum headache (Primary)  [R03.0] Elevated blood pressure reading          ED Disposition Condition    Discharge Stable          ED Prescriptions    None       Follow-up Information    None          Margie Mathews MD  Resident  [phone]       Gita Bonilla MD  [phone]

## 2024-12-02 ENCOUNTER — POSTPARTUM VISIT (OUTPATIENT)
Dept: OBSTETRICS AND GYNECOLOGY | Facility: CLINIC | Age: 34
End: 2024-12-02
Payer: COMMERCIAL

## 2024-12-02 VITALS — SYSTOLIC BLOOD PRESSURE: 122 MMHG | DIASTOLIC BLOOD PRESSURE: 82 MMHG | BODY MASS INDEX: 28.78 KG/M2 | HEIGHT: 61 IN

## 2024-12-02 PROCEDURE — 99999 PR PBB SHADOW E&M-EST. PATIENT-LVL III: CPT | Mod: PBBFAC,,, | Performed by: OBSTETRICS & GYNECOLOGY

## 2024-12-02 NOTE — PROGRESS NOTES
"CC: BP / INCISION CHECK    HPI:  Tamara Aldana is a 34 y.o. female  who presents for a BP / incision check.  She is 1 week S/P a repeat  on 24.  Her delivery was remarkable for gestational hypertension.  [facility] BPs at home have been normal to mildly elevated.  BP in the office today: 122/82.  Describes headache several days ago, no headache now.  Denies blurred vision and epigastric pain.  Reports mild incisional discomfort.  No fever.   No bowel / bladder complaints.  She is breast feeding.  Reports that her mood is ok.    Delivery Date: 24  Delivery MD: Dr. Coronado    Pregnancy was complicated by:  Gestational hypertension  Prior C/S  Fetal growth restriction  Anxiety / depression    /82   Ht 5' 1" (1.549 m)   LMP 2024   Breastfeeding Yes   BMI 28.78 kg/m²     PHYSICAL EXAM:  ABDOMEN:  Soft, non-tender, non-distended  INCISION: Healing well, intact, no drainage, no erythema, no sign of infection - tail of stitch at left margin trimmed.  EXTREMITIES:  No calf tenderness bilaterally.    IMP:  Gestational hypertension  Doing well one week S/P C/S  Instructions / precautions reviewed    PLAN:  Return: 4 weeks for PP check    This note was created with voice recognition software.  Grammatical, syntax and spelling errors may be inevitable.        "

## 2024-12-06 ENCOUNTER — POSTPARTUM VISIT (OUTPATIENT)
Dept: OBSTETRICS AND GYNECOLOGY | Facility: CLINIC | Age: 34
End: 2024-12-06
Attending: OBSTETRICS & GYNECOLOGY
Payer: COMMERCIAL

## 2024-12-06 VITALS
WEIGHT: 142.88 LBS | HEIGHT: 61 IN | SYSTOLIC BLOOD PRESSURE: 128 MMHG | DIASTOLIC BLOOD PRESSURE: 88 MMHG | BODY MASS INDEX: 26.98 KG/M2

## 2024-12-06 DIAGNOSIS — N76.0 ACUTE VAGINITIS: Primary | ICD-10-CM

## 2024-12-06 PROCEDURE — 99999 PR PBB SHADOW E&M-EST. PATIENT-LVL III: CPT | Mod: PBBFAC,,, | Performed by: OBSTETRICS & GYNECOLOGY

## 2024-12-06 PROCEDURE — 0352U VAGINOSIS SCREEN BY DNA PROBE: CPT | Performed by: OBSTETRICS & GYNECOLOGY

## 2024-12-06 NOTE — ADDENDUM NOTE
Addended by: PARISH MACE on: 12/6/2024 01:58 PM     Modules accepted: Orders     Reviewed anticoagulation note and I agree.    Andra Jaquez, NP-C, APNP

## 2024-12-06 NOTE — PROGRESS NOTES
"CC: VAGINITIS CHECK    HPI:  Tamara Aldana is a 34 y.o. female  who presents for evaluation of vaginitis.  She is 2 weeks S/P a repeat  on 24. For the past several days, she describes having an increased vaginal discharge which appears greenish in color.  She describes also noting mild vaginal itching and irritation.  Her delivery was remarkable for gestational hypertension.  BP today 128/88.  Only minimal mild incisional discomfort with certain movements.  No fever.   No bowel / bladder complaints.  She is bottle feeding.  Reports that her child will need to follow-up with a pediatric urologist.    Delivery Date: 24  Delivery MD: Dr. Coronado  Breast feeding: No    Pregnancy was complicated by:  Gestational hypertension  Prior C/S  Fetal growth restriction  Anxiety / depression    /88   Ht 5' 1" (1.549 m)   Wt 64.8 kg (142 lb 13.7 oz)   LMP 2024   Breastfeeding No   BMI 26.99 kg/m²     PHYSICAL EXAM:  ABDOMEN:  Soft, non-tender, non-distended  INCISION: Healing well, intact, no drainage, no erythema, no sign of infection  VULVA: No lesions.  VAGINA:  No bleeding noted.  Increased gray / slightly greenish vaginal discharge - Affirm performed.    IMP:  Vaginitis  Two weeks S/P repeat C/S  Gestational hypertension - upper normal    We will contact her with results of the Affirm  PP instructions / precautions reviewed    PLAN:  Return: 4 weeks for PP check    I spent a total of 15 minutes on the day of the visit.This includes face to face time and non-face to face time preparing to see the patient (eg, review of tests), Obtaining and/or reviewing separately obtained history, Documenting clinical information in the electronic or other health record, Independently interpreting resultsand communicating results to the patient/family/caregiver, or Care coordination.    This note was created with voice recognition software.  Grammatical, syntax and spelling errors may be " inevitable.

## 2024-12-07 ENCOUNTER — PATIENT MESSAGE (OUTPATIENT)
Dept: OBSTETRICS AND GYNECOLOGY | Facility: CLINIC | Age: 34
End: 2024-12-07
Payer: COMMERCIAL

## 2024-12-07 LAB
BACTERIAL VAGINOSIS DNA: NOT DETECTED
CANDIDA GLABRATA/KRUSEI: NOT DETECTED
CANDIDA RRNA VAG QL PROBE: NOT DETECTED
TRICHOMONAS VAGINALIS: NOT DETECTED

## 2024-12-10 ENCOUNTER — ANESTHESIA (OUTPATIENT)
Dept: OBSTETRICS AND GYNECOLOGY | Facility: OTHER | Age: 34
End: 2024-12-10
Payer: COMMERCIAL

## 2025-01-08 ENCOUNTER — POSTPARTUM VISIT (OUTPATIENT)
Dept: OBSTETRICS AND GYNECOLOGY | Facility: CLINIC | Age: 35
End: 2025-01-08
Payer: COMMERCIAL

## 2025-01-08 VITALS
HEIGHT: 61 IN | SYSTOLIC BLOOD PRESSURE: 116 MMHG | DIASTOLIC BLOOD PRESSURE: 76 MMHG | BODY MASS INDEX: 27.55 KG/M2 | WEIGHT: 145.94 LBS

## 2025-01-08 DIAGNOSIS — Z30.011 ENCOUNTER FOR INITIAL PRESCRIPTION OF CONTRACEPTIVE PILLS: ICD-10-CM

## 2025-01-08 PROCEDURE — 0503F POSTPARTUM CARE VISIT: CPT | Mod: CPTII,S$GLB,, | Performed by: OBSTETRICS & GYNECOLOGY

## 2025-01-08 PROCEDURE — 99999 PR PBB SHADOW E&M-EST. PATIENT-LVL III: CPT | Mod: PBBFAC,,, | Performed by: OBSTETRICS & GYNECOLOGY

## 2025-01-08 RX ORDER — DROSPIRENONE 4 MG/1
4 TABLET, FILM COATED ORAL DAILY
Qty: 28 TABLET | Refills: 11 | Status: SHIPPED | OUTPATIENT
Start: 2025-01-08

## 2025-01-08 RX ORDER — SERTRALINE HYDROCHLORIDE 50 MG/1
50 TABLET, FILM COATED ORAL DAILY
COMMUNITY
Start: 2024-12-30

## 2025-01-08 RX ORDER — DEXTROAMPHETAMINE SACCHARATE, AMPHETAMINE ASPARTATE, DEXTROAMPHETAMINE SULFATE AND AMPHETAMINE SULFATE 2.5; 2.5; 2.5; 2.5 MG/1; MG/1; MG/1; MG/1
1 TABLET ORAL 2 TIMES DAILY
COMMUNITY
Start: 2024-12-30

## 2025-01-08 NOTE — PROGRESS NOTES
"CC: Post-partum follow-up    Tamara Aldana is a 34 y.o. female  who presents for post-partum visit.  She is 6 weeks S/P a repeat C/S on 24.  She and the baby are doing well.  No pain.  No fever.   No bowel / bladder complaints.  She had been seen 24 for evaluation of increased discharge.  At that time, screening for vaginitis was negative.  BP today is normal: 116/76.  She is bottle feeding.  Her first period after delivery began yesterday.  Previously, while on combination OCPs, she describes having frequent headaches.  She has a bicornuate uterus, documented by ultrasound.  We reviewed contraceptive options and she would like to use a progesterone only pill.  We reviewed Slynd: r / b / correct usage.  She has anxiety for which she is on Zoloft and is followed by her psychiatrist.  Denies h/s ideations.  She feels that she is doing well on Zoloft.  Reports recent URI- feeling better now.    Delivery Date: 24  Delivery MD: Dr. Coronado  Breast Feeding: NO  Depression: NO  Contraception: oral progesterone-only contraceptive    Pregnancy was complicated by:  Bicornuate uterus.  Gestational hypertension  Prior C/S  Fetal growth restriction  Anxiety / depression    23 Pap: Negative, HPV: Negative    /76   Ht 5' 1" (1.549 m)   Wt 66.2 kg (145 lb 15.1 oz)   LMP 2024 (Exact Date)   Breastfeeding No   BMI 27.58 kg/m²     ROS:  GENERAL: No fever, chills, fatigability.  VULVAR: No pain, no lesions and no itching.  VAGINAL: No relaxation, no itching, no discharge, no abnormal bleeding and no lesions.  ABDOMEN: No abdominal pain. Denies nausea. Denies vomiting. No diarrhea. No constipation  BREAST: Denies pain. No lumps.  URINARY: No incontinence, no nocturia, no frequency and no dysuria.  CARDIOVASCULAR: No chest pain. No shortness of breath. No leg cramps.  NEUROLOGICAL: No headaches. No vision changes.    PHYSICAL EXAM:  Exam chaperoned by nurse  ABDOMEN:  Soft, " non-tender, non-distended  INCISION: Well healed.  No sign of infection.  VULVA:  Normal, no lesions  CERVIX:  Without lesions, polyps or tenderness.  UTERUS:  Normal size, shape, consistency, no mass or tenderness.  ADNEXA:  Normal in size without mass or tenderness    IMP:  Doing well 6 weeks S/P repeat C/S  Instructions / precautions reviewed  Contraceptive counseling  Bicornuate uterus    Rx Slynd    PLAN:  May resume normal activities  Return for annual exam    This note was created with voice recognition software.  Grammatical, syntax and spelling errors may be inevitable.

## 2025-01-26 NOTE — PROGRESS NOTES
[facility] Progress Note  2025          Reason for Visit:      is following up after delivery      History of Present Illness:     Pt is here today for an annual check up  She delivered a baby in November.    Gestational HTN  She delivered her second child via  on  at 37 weeks due to hypertension (/100). The baby weighed 5 lbs and did not require NICU care. She reports occasional achiness at the  site, though it has improved significantly. During pregnancy, she was borderline anemic requiring iron supplementation and contracted COVID-19 with fever up to 101°F.  Her gestational HTN did not require pharmacotherapy, and has resolved after delivery.    Post-partum Depression  She has postpartum depression and anxiety, currently managed with Zoloft prescribed by her psychiatrist. She previously took Prozac but discontinued during pregnancy. Initially experienced light sensitivity when starting Zoloft, which has resolved. The medication is now effective without side effects.    DERMATOLOGIC:  She has a history of recurrent herpetic-looking facial rash since childhood with prodromal symptoms prior to outbreaks. The rash is extremely itchy without associated pain. She manages symptoms with Valtrex and acyclovir ointment.      RECENT MEDICAL HISTORY:  She had a colonoscopy in  where a polyp was found. She experienced a respiratory illness last month with congestion.    FAMILY HISTORY:  Mother and maternal grandmother have history of squamous cell carcinoma of the nose. Father has history of colon polyps and paternal grandfather  of colon cancer.    SOCIAL HISTORY:  She reports rare alcohol use with easy hangover symptoms even with minimal consumption. She denies smoking and drug use.         Problem List:     Patient Active Problem List   Diagnosis    Attention-deficit hyperactivity disorder, unspecified type    Previous  delivery affecting  pregnancy, antepartum    Depression    Anxiety    Bicornuate uterus affecting pregnancy, antepartum    History of pre-eclampsia in prior pregnancy, currently pregnant    Single delivery by  section    Fetal growth restriction antepartum    Gestational hypertension         Surgical History:     She has a past surgical history that includes Tonsillectomy (); [place] tooth extraction (); LASIK (Bilateral, );  section (N/A, 2021); Colonoscopy (N/A, 2023); Esophagogastroduodenoscopy (N/A, 2023); and  section (N/A, 2024).      Family History:      Her family history includes Cancer in her paternal grandfather; Colon cancer in her paternal grandfather; Colon polyps in her father; Hyperlipidemia in her mother; Schizophrenia in her mother; Skin cancer in her mother.      Social History:     She reports that she has never smoked. She has never been exposed to tobacco smoke. She has never used smokeless tobacco. She reports current alcohol use. She reports that she does not use drugs.      Medications:       Current Outpatient Medications:     acyclovir 5% (ZOVIRAX) 5 % ointment, Apply topically every 3 (three) hours. (Patient taking differently: Apply topically as needed.), Disp: 30 g, Rfl: 2    cetirizine (ZYRTEC) 10 MG tablet, Take 10 mg by mouth once daily., Disp: , Rfl:     dextroamphetamine-amphetamine 10 mg Tab, Take 1 tablet by mouth 2 (two) times daily., Disp: , Rfl:     drospirenone, contraceptive, (SLYND) 4 mg (28) Tab, Take 1 tablet (4 mg total) by mouth Daily., Disp: 28 tablet, Rfl: 11    LORazepam (ATIVAN) 1 MG tablet, Take 0.5 mg by mouth once daily. Daily prn, Disp: , Rfl:     sertraline (ZOLOFT) 50 MG tablet, Take 50 mg by mouth once daily., Disp: , Rfl:     valACYclovir (VALTREX) 1000 MG tablet, Take 2 tablets (2,000 mg total) by mouth 2 (two) times daily as needed (for 1 day as needed for cold sore)., Disp: 60 tablet, Rfl: 1        Allergies:   She is  "allergic to bactrim [sulfamethoxazole-trimethoprim].      Review of Systems:     Review of Systems   Constitutional:  Negative for chills and fever.   HENT:  Negative for ear pain and sore throat.    Respiratory:  Negative for cough, shortness of breath and wheezing.    Cardiovascular:  Negative for chest pain and palpitations.   Gastrointestinal:  Negative for constipation, diarrhea, nausea and vomiting.   Genitourinary:  Negative for dysuria and hematuria.   Musculoskeletal:  Negative for joint swelling and joint deformity.   Neurological:  Negative for dizziness and weakness.           Physical Exam:     Temp:             Blood Pressure:  110/80        Pulse:   66     Respirations:     Weight:   67 kg (147 lb 11.3 oz)  Height:   5' 1" (1.549 m)  BMI:     Body mass index is 27.91 kg/m².    Physical Exam  Constitutional:       General: She is not in acute distress.  HENT:      Right Ear: Tympanic membrane normal.      Left Ear: Tympanic membrane normal.      Nose: No congestion or rhinorrhea.      Mouth/Throat:      Pharynx: No oropharyngeal exudate or posterior oropharyngeal erythema.   Cardiovascular:      Rate and Rhythm: Normal rate and regular rhythm.   Pulmonary:      Effort: Pulmonary effort is normal. No respiratory distress.      Breath sounds: No wheezing.   Abdominal:      Palpations: Abdomen is soft.      Tenderness: There is no abdominal tenderness.   Skin:     General: Skin is warm.   Neurological:      General: No focal deficit present.      Mental Status: She is alert and oriented to person, place, and time.           Labs/Imaging/Testing:     Most recent CBC:  Lab Results   Component Value Date    WBC 14.04 (H) 11/26/2024    HGB 11.9 (L) 11/26/2024     11/26/2024    MCV 95 11/26/2024       Most recent Lipids:  Lab Results   Component Value Date    CHOL 153 04/04/2023    HDL 43 04/04/2023    LDLCALC 94.2 04/04/2023    TRIG 79 04/04/2023       Most recent Chemistry:  Lab Results   Component " Value Date     (L) 11/25/2024    K 3.8 11/25/2024     11/25/2024    CO2 18 (L) 11/25/2024    BUN 6 11/25/2024    CREATININE 0.7 11/25/2024     11/25/2024    CALCIUM 8.8 11/25/2024    ALT 14 11/25/2024    AST 20 11/25/2024    ALKPHOS 111 11/25/2024    PROT 6.1 11/25/2024    ALBUMIN 3.0 (L) 11/25/2024       Other tests:  Lab Results   Component Value Date    TSH 1.363 04/04/2023    FREET4 0.78 04/04/2023    HGBA1C 5.1 04/04/2023    LIPASE 19 11/15/2023         Assessment and Plan:     1. Well adult exam  - CBC Auto Differential; Future  - Comprehensive Metabolic Panel; Future  - Lipid Panel; Future  - Hemoglobin A1C; Future  - TSH; Future    2. Gestational HTN, unspecified trimester  Will continue to monitor, but readings have been ok since delivery    3. Postpartum depression  - Assessed postpartum health status, including resolution of pregnancy-induced hypertension.  - Considered thyroid function screening as part of routine postpartum care.  - Explained potential for postpartum thyroid issues and associated fatigue.  - Continued Zoloft for postpartum depression/anxiety management.       RECURRENT FACIAL RASH:  - Evaluated recurrent facial rash, likely herpetic rather than shingles based on symptom pattern and location.  - Explained differences between herpes and shingles, noting similar appearance and treatment approach.  - Discussed safety of Valtrex during pregnancy.  - Continued Valtrex as needed for herpetic outbreaks.  - Continued acyclovir ointment as needed for herpetic outbreaks.  - Follow up with office if eye pain or globe involvement occurs during facial rash outbreaks.    FAMILY HISTORY OF CANCER:  - Reviewed family history of skin cancer and colon cancer, noting prior colonoscopy with polyp removal.  - Follow up in 5 years for next colonoscopy (2028).    THYROID DISORDER:  - Ordered routine labs including TSH for thyroid screening.    LABS:  - Performed in-office blood draw for  ordered labs.  - Contact office for lab results by this evening or tomorrow if not received.            Wang Salvador MD  1/28/2025    This note was prepared using voice-recognition software.  Although efforts are made to proofread the note, some errors may persist in the final document.

## 2025-01-28 ENCOUNTER — OFFICE VISIT (OUTPATIENT)
Dept: PRIMARY CARE CLINIC | Facility: CLINIC | Age: 35
End: 2025-01-28
Payer: COMMERCIAL

## 2025-01-28 ENCOUNTER — LAB VISIT (OUTPATIENT)
Dept: LAB | Facility: HOSPITAL | Age: 35
End: 2025-01-28
Attending: INTERNAL MEDICINE
Payer: COMMERCIAL

## 2025-01-28 VITALS
HEIGHT: 61 IN | HEART RATE: 66 BPM | WEIGHT: 147.69 LBS | DIASTOLIC BLOOD PRESSURE: 80 MMHG | BODY MASS INDEX: 27.88 KG/M2 | OXYGEN SATURATION: 98 % | SYSTOLIC BLOOD PRESSURE: 110 MMHG

## 2025-01-28 DIAGNOSIS — Z00.00 WELL ADULT EXAM: Primary | ICD-10-CM

## 2025-01-28 DIAGNOSIS — Z00.00 WELL ADULT EXAM: ICD-10-CM

## 2025-01-28 DIAGNOSIS — O13.9 GESTATIONAL HTN, UNSPECIFIED TRIMESTER: ICD-10-CM

## 2025-01-28 LAB
ALBUMIN SERPL BCP-MCNC: 4.2 G/DL (ref 3.5–5.2)
ALP SERPL-CCNC: 65 U/L (ref 40–150)
ALT SERPL W/O P-5'-P-CCNC: 17 U/L (ref 10–44)
ANION GAP SERPL CALC-SCNC: 9 MMOL/L (ref 8–16)
AST SERPL-CCNC: 16 U/L (ref 10–40)
BASOPHILS # BLD AUTO: 0.04 K/UL (ref 0–0.2)
BASOPHILS NFR BLD: 0.4 % (ref 0–1.9)
BILIRUB SERPL-MCNC: 0.7 MG/DL (ref 0.1–1)
BUN SERPL-MCNC: 13 MG/DL (ref 6–20)
CALCIUM SERPL-MCNC: 9.2 MG/DL (ref 8.7–10.5)
CHLORIDE SERPL-SCNC: 104 MMOL/L (ref 95–110)
CHOLEST SERPL-MCNC: 176 MG/DL (ref 120–199)
CHOLEST/HDLC SERPL: 3 {RATIO} (ref 2–5)
CO2 SERPL-SCNC: 23 MMOL/L (ref 23–29)
CREAT SERPL-MCNC: 0.7 MG/DL (ref 0.5–1.4)
DIFFERENTIAL METHOD BLD: ABNORMAL
EOSINOPHIL # BLD AUTO: 0.2 K/UL (ref 0–0.5)
EOSINOPHIL NFR BLD: 2.3 % (ref 0–8)
ERYTHROCYTE [DISTWIDTH] IN BLOOD BY AUTOMATED COUNT: 13 % (ref 11.5–14.5)
EST. GFR  (NO RACE VARIABLE): >60 ML/MIN/1.73 M^2
ESTIMATED AVG GLUCOSE: 94 MG/DL (ref 68–131)
GLUCOSE SERPL-MCNC: 76 MG/DL (ref 70–110)
HBA1C MFR BLD: 4.9 % (ref 4–5.6)
HCT VFR BLD AUTO: 42.5 % (ref 37–48.5)
HDLC SERPL-MCNC: 58 MG/DL (ref 40–75)
HDLC SERPL: 33 % (ref 20–50)
HGB BLD-MCNC: 14 G/DL (ref 12–16)
IMM GRANULOCYTES # BLD AUTO: 0.03 K/UL (ref 0–0.04)
IMM GRANULOCYTES NFR BLD AUTO: 0.3 % (ref 0–0.5)
LDLC SERPL CALC-MCNC: 102.2 MG/DL (ref 63–159)
LYMPHOCYTES # BLD AUTO: 2.1 K/UL (ref 1–4.8)
LYMPHOCYTES NFR BLD: 21.9 % (ref 18–48)
MCH RBC QN AUTO: 31.5 PG (ref 27–31)
MCHC RBC AUTO-ENTMCNC: 32.9 G/DL (ref 32–36)
MCV RBC AUTO: 96 FL (ref 82–98)
MONOCYTES # BLD AUTO: 0.6 K/UL (ref 0.3–1)
MONOCYTES NFR BLD: 5.9 % (ref 4–15)
NEUTROPHILS # BLD AUTO: 6.6 K/UL (ref 1.8–7.7)
NEUTROPHILS NFR BLD: 69.2 % (ref 38–73)
NONHDLC SERPL-MCNC: 118 MG/DL
NRBC BLD-RTO: 0 /100 WBC
PLATELET # BLD AUTO: 320 K/UL (ref 150–450)
PMV BLD AUTO: 10.9 FL (ref 9.2–12.9)
POTASSIUM SERPL-SCNC: 4 MMOL/L (ref 3.5–5.1)
PROT SERPL-MCNC: 7.4 G/DL (ref 6–8.4)
RBC # BLD AUTO: 4.45 M/UL (ref 4–5.4)
SODIUM SERPL-SCNC: 136 MMOL/L (ref 136–145)
TRIGL SERPL-MCNC: 79 MG/DL (ref 30–150)
TSH SERPL DL<=0.005 MIU/L-ACNC: 1.29 UIU/ML (ref 0.4–4)
WBC # BLD AUTO: 9.47 K/UL (ref 3.9–12.7)

## 2025-01-28 PROCEDURE — 36415 COLL VENOUS BLD VENIPUNCTURE: CPT | Mod: PN | Performed by: INTERNAL MEDICINE

## 2025-01-28 PROCEDURE — 84443 ASSAY THYROID STIM HORMONE: CPT | Performed by: INTERNAL MEDICINE

## 2025-01-28 PROCEDURE — 1159F MED LIST DOCD IN RCRD: CPT | Mod: CPTII,S$GLB,, | Performed by: INTERNAL MEDICINE

## 2025-01-28 PROCEDURE — 99395 PREV VISIT EST AGE 18-39: CPT | Mod: S$GLB,,, | Performed by: INTERNAL MEDICINE

## 2025-01-28 PROCEDURE — 3079F DIAST BP 80-89 MM HG: CPT | Mod: CPTII,S$GLB,, | Performed by: INTERNAL MEDICINE

## 2025-01-28 PROCEDURE — 80061 LIPID PANEL: CPT | Performed by: INTERNAL MEDICINE

## 2025-01-28 PROCEDURE — 3008F BODY MASS INDEX DOCD: CPT | Mod: CPTII,S$GLB,, | Performed by: INTERNAL MEDICINE

## 2025-01-28 PROCEDURE — 99999 PR PBB SHADOW E&M-EST. PATIENT-LVL IV: CPT | Mod: PBBFAC,,, | Performed by: INTERNAL MEDICINE

## 2025-01-28 PROCEDURE — 80053 COMPREHEN METABOLIC PANEL: CPT | Performed by: INTERNAL MEDICINE

## 2025-01-28 PROCEDURE — 85025 COMPLETE CBC W/AUTO DIFF WBC: CPT | Performed by: INTERNAL MEDICINE

## 2025-01-28 PROCEDURE — 3074F SYST BP LT 130 MM HG: CPT | Mod: CPTII,S$GLB,, | Performed by: INTERNAL MEDICINE

## 2025-01-28 PROCEDURE — 83036 HEMOGLOBIN GLYCOSYLATED A1C: CPT | Performed by: INTERNAL MEDICINE

## 2025-01-28 RX ORDER — LORAZEPAM 1 MG/1
0.5 TABLET ORAL DAILY
COMMUNITY

## 2025-02-20 ENCOUNTER — OFFICE VISIT (OUTPATIENT)
Dept: DERMATOLOGY | Facility: CLINIC | Age: 35
End: 2025-02-20
Payer: COMMERCIAL

## 2025-02-20 ENCOUNTER — PATIENT MESSAGE (OUTPATIENT)
Dept: DERMATOLOGY | Facility: CLINIC | Age: 35
End: 2025-02-20

## 2025-02-20 DIAGNOSIS — D22.9 MULTIPLE BENIGN NEVI: ICD-10-CM

## 2025-02-20 DIAGNOSIS — L71.9 ROSACEA: Primary | ICD-10-CM

## 2025-02-20 DIAGNOSIS — Z87.898 HISTORY OF ATYPICAL NEVUS: ICD-10-CM

## 2025-02-20 DIAGNOSIS — Z12.83 SKIN CANCER SCREENING: ICD-10-CM

## 2025-02-20 DIAGNOSIS — D18.01 CHERRY ANGIOMA: ICD-10-CM

## 2025-02-20 DIAGNOSIS — L81.4 LENTIGINES: ICD-10-CM

## 2025-02-20 RX ORDER — TRETINOIN 0.25 MG/G
CREAM TOPICAL
Qty: 30 G | Refills: 5 | Status: SHIPPED | OUTPATIENT
Start: 2025-02-20

## 2025-02-20 NOTE — PROGRESS NOTES
"  Patient Information  Name: Tamara Aldana  : 1990  MRN: [id number]     Referring Physician:  No ref. provider found   Primary Care Physician:  Wang Salvador MD   Date of Visit: 25      Subjective:     History of Present lllness:    Tamara Aldana is a 34 y.o. female who presents with a chief complaint of moles and spot.  Patient is here today for a "mole" check.     Today, patient complains of lesion(s):  Location: scalp  Duration: she is unsure how long it has been there but noticed it ~8 months ago  Symptoms: dark spot on scalp, no changes in size, shape, or color since first noticed; no itching, bleeding, pain, or rapid growth.  Relieving factors/Previous treatments: none    Patient was last seen: 2022.  Prior notes by myself reviewed.   Clinical documentation obtained by nursing staff reviewed.    Review of Systems    Objective:   Physical Exam   Constitutional: She appears well-developed and well-nourished. No distress.   Neurological: She is alert and oriented to person, place, and time. She is not disoriented.   Psychiatric: She has a normal mood and affect.   Skin:   Areas Examined (abnormalities noted in diagram):   Scalp / Hair Palpated and Inspected  Head / Face Inspection Performed  Neck Inspection Performed  Chest / Axilla Inspection Performed  Abdomen Inspection Performed  Back Inspection Performed  RUE Inspected  LUE Inspection Performed  RLE Inspected  LLE Inspection Performed  Nails and Digits Inspection Performed                 Diagram Legend     Erythematous scaling macule/papule c/w actinic keratosis       Vascular papule c/w angioma      Pigmented verrucoid papule/plaque c/w seborrheic keratosis      Yellow umbilicated papule c/w sebaceous hyperplasia      Irregularly shaped tan macule c/w lentigo     1-2 mm smooth white papules consistent with Milia      Movable subcutaneous cyst with punctum c/w epidermal inclusion cyst      Subcutaneous movable " cyst c/w pilar cyst      Firm pink to brown papule c/w dermatofibroma      Pedunculated fleshy papule(s) c/w skin tag(s)      Evenly pigmented macule c/w junctional nevus     Mildly variegated pigmented, slightly irregular-bordered macule c/w mildly atypical nevus      Flesh colored to evenly pigmented papule c/w intradermal nevus       Pink pearly papule/plaque c/w basal cell carcinoma      Erythematous hyperkeratotic cursted plaque c/w SCC      Surgical scar with no sign of skin cancer recurrence      Open and closed comedones      Inflammatory papules and pustules      Verrucoid papule consistent consistent with wart     Erythematous eczematous patches and plaques     Dystrophic onycholytic nail with subungual debris c/w onychomycosis     Umbilicated papule    Erythematous-base heme-crusted tan verrucoid plaque consistent with inflamed seborrheic keratosis     Erythematous Silvery Scaling Plaque c/w Psoriasis     See annotation             [] Data reviewed  [] Prior external notes reviewed  [] Independent review of test  [] Management discussed with another provider  [] Independent historian    Assessment / Plan:        Rosacea  - chronic problem, not at treatment goal  Rosacea is a chronic condition without a definitive cure.  There are several well-known triggers, such as exercise, temperature extremes, alcohol, and spicy foods, that should be avoided to prevent a rosacea flare.  Use gentle, ceramide-containing products (such as CeraVe Moisturizing Cream or La Roche-Posay Toleriane Double Repair Face Moisturizer) to repair the skin barrier and to minimize irritation. Avoid harsh soaps, exfoliants, and scrubs.  -     tretinoin (RETIN-A) 0.025 % cream; Compound tretinoin 0.025% / azelaic acid 8% / niacinamide 2% cream. Apply a pea-sized amount to entire face qhs.  Dispense: 30 g; Refill: 5    Multiple benign nevi  Multiple benign-appearing nevi present on exam today. Reassurance provided. Counseled patient to  periodically examine moles and return to clinic if any changes in size, shape, or color are noted or if it becomes symptomatic (bleeding, itching, pain, etc).  Recommend using a broad-spectrum, water-resistant sunscreen with SPF of 30 or higher--reapply every 2 hours. Seek shade, wear sun-protective clothing, and perform regular skin self-exams.    Cherry angioma  These are benign vascular lesions that are inherited. Treatment is not necessary.    Lentigines  These are benign sun spots which should be monitored for changes. Daily sun protection will reduce the number of new lesions.   Recommend using a broad-spectrum, water-resistant sunscreen with SPF of 30 or higher--reapply every 2 hours. Seek shade, wear sun-protective clothing, and perform regular skin self-exams.    History of atypical nevus, moderate atypia   - stable and chronic  Area(s) of previous dysplastic nevus evaluated with no evidence of recurrence. Reassurance provided.    Skin cancer screening  Total body skin examination performed today as noted in physical exam. No lesions suspicious for malignancy were seen.  Recommend using a broad-spectrum, water-resistant sunscreen with SPF of 30 or higher--reapply every 2 hours. Seek shade, wear sun-protective clothing, and perform regular skin self-exams.       Follow up in about 3 months (around 5/20/2025).      Zohreh Nichole MD, FAAD  [facility]

## 2025-02-20 NOTE — PATIENT INSTRUCTIONS
RETINOIDS   Your Doctor has prescribed a topical retinoid for your skin.  A retinoid is a vitamin A-derived product used to treat a variety of skin conditions including acne, actinic keratoses (pre-skin cancers), uneven pigmentation from sun damage, fine lines and wrinkles, and enlarged pores.      How do they work?   Retinoids increase skin cell turn over from the normal 30 days to five or six days, minimizing clogged pores--the major factor in acne.  Retinoids can also repair the DNA in cells damaged by the sun, helping to even out skin pigmentation and clear pre-skin cancers.  They stimulate collagen remodeling and repair, reversing signs of maturing skin.   They can shrink oil glands and minimize the appearance of large pores. These effects can not be appreciated unless the medication is used on a consistent basis!    How do I use a retinoid?   After washing with a mild cleanser (CeraVe, Cetaphil, Neutrogena, Purpose), the skin should be moisturized with a non-retinol containing moisturizer such as Cerave cream or PM lotion. Then, a thin layer of medication is applied to the forehead, nose, cheeks, and chin (and around eyes if treating fine wrinkles) at night.  The amount of medication needed to cover the entire face should be no more than the size of a green pea. Irritation around the eyes can be treated with Vaseline at night.     What if my skin appears dry, red, and is peeling?   Retinoids do not cause dry skin but rather they cause the top layer of the skin to shed, giving an appearance of dry skin.  In fact, new healthy skin cells are replacing the older, damaged cells on the surface. This usually occurs the first 2-4 weeks as the skin is adjusting to the medication.  It is reasonable to use the medication every other night or even every three nights until your skin adjusts.  You can use a MILD exfoliant to remove the peeling skin (Aveeno daily clarifying pads, Aqua glycolic face cream) and can apply a  moisturizer throughout the day as needed. Retinoids come in a variety of strengths and vehicles, and your doctor can find one best for you.  If you cannot tolerate prescription strength retinoids, over the counter products with retinol may be beneficial ([place] ProX wrinkle cream, LISA deep wrinkle cream, Green Cream at [employer])    Will my skin be more sensitive in the sun?   You will need to use a sunscreen with SPF 30 daily.  Retinoids will cause the outermost layer of the skin to be thinner and thus more sensitive to ultraviolet rays.  However, remember that over time, retinoids actually make the skin thicker by enhancing collagen deposition which protects the skin from sun damage.     When will I see results?   If you are using a retinoid for acne, you should see some improvement in 6-8 weeks.  Do not be alarmed if you find that your acne gets WORSE before it gets better- KEEP USING THE MEDICATION- this is a normal response and your acne will improve if you can stick with it.     If you are using the medication for anti-aging and skin dyspigmentation, you may see results in 3 months, but most effects are not visible until 6 months.  Retinoids are clinically proven to reverse signs of aging, but only if used on a CONSISTENT BASIS!   *Remember that retinoids should not be used if you are pregnant.  *Discontinue use 1 week prior to waxing, as skin is more likely to tear.

## 2025-02-24 ENCOUNTER — OFFICE VISIT (OUTPATIENT)
Dept: PRIMARY CARE CLINIC | Facility: CLINIC | Age: 35
End: 2025-02-24
Payer: COMMERCIAL

## 2025-02-24 VITALS
HEIGHT: 61 IN | SYSTOLIC BLOOD PRESSURE: 116 MMHG | OXYGEN SATURATION: 99 % | WEIGHT: 143.94 LBS | BODY MASS INDEX: 27.18 KG/M2 | TEMPERATURE: 98 F | DIASTOLIC BLOOD PRESSURE: 66 MMHG | HEART RATE: 78 BPM

## 2025-02-24 DIAGNOSIS — J30.9 ALLERGIC RHINITIS, UNSPECIFIED SEASONALITY, UNSPECIFIED TRIGGER: ICD-10-CM

## 2025-02-24 DIAGNOSIS — J32.9 SINUSITIS, UNSPECIFIED CHRONICITY, UNSPECIFIED LOCATION: ICD-10-CM

## 2025-02-24 DIAGNOSIS — F41.1 GAD (GENERALIZED ANXIETY DISORDER): Primary | ICD-10-CM

## 2025-02-24 PROCEDURE — 3044F HG A1C LEVEL LT 7.0%: CPT | Mod: CPTII,S$GLB,, | Performed by: FAMILY MEDICINE

## 2025-02-24 PROCEDURE — 99214 OFFICE O/P EST MOD 30 MIN: CPT | Mod: S$GLB,,, | Performed by: FAMILY MEDICINE

## 2025-02-24 PROCEDURE — 3008F BODY MASS INDEX DOCD: CPT | Mod: CPTII,S$GLB,, | Performed by: FAMILY MEDICINE

## 2025-02-24 PROCEDURE — 99999 PR PBB SHADOW E&M-EST. PATIENT-LVL V: CPT | Mod: PBBFAC,,, | Performed by: FAMILY MEDICINE

## 2025-02-24 PROCEDURE — 3074F SYST BP LT 130 MM HG: CPT | Mod: CPTII,S$GLB,, | Performed by: FAMILY MEDICINE

## 2025-02-24 PROCEDURE — 1159F MED LIST DOCD IN RCRD: CPT | Mod: CPTII,S$GLB,, | Performed by: FAMILY MEDICINE

## 2025-02-24 PROCEDURE — 3078F DIAST BP <80 MM HG: CPT | Mod: CPTII,S$GLB,, | Performed by: FAMILY MEDICINE

## 2025-02-24 PROCEDURE — 1160F RVW MEDS BY RX/DR IN RCRD: CPT | Mod: CPTII,S$GLB,, | Performed by: FAMILY MEDICINE
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
## 2025-02-24 NOTE — PATIENT INSTRUCTIONS
10 % happier mary kay    Goal of intentional brisk activity daily, if possible outside with friends    Flonase daily until it runs out    Can take anti-histamine twice daily, try Xyzal at night in addition to the Zyrtec in the morning

## 2025-03-19 ENCOUNTER — PATIENT MESSAGE (OUTPATIENT)
Dept: GASTROENTEROLOGY | Facility: CLINIC | Age: 35
End: 2025-03-19
Payer: COMMERCIAL

## 2025-03-19 DIAGNOSIS — R10.9 ABDOMINAL PAIN, UNSPECIFIED ABDOMINAL LOCATION: Primary | ICD-10-CM

## 2025-03-30 ENCOUNTER — PATIENT MESSAGE (OUTPATIENT)
Dept: OBSTETRICS AND GYNECOLOGY | Facility: CLINIC | Age: 35
End: 2025-03-30
Payer: COMMERCIAL

## 2025-04-06 ENCOUNTER — PATIENT MESSAGE (OUTPATIENT)
Dept: OBSTETRICS AND GYNECOLOGY | Facility: CLINIC | Age: 35
End: 2025-04-06
Payer: COMMERCIAL

## 2025-04-08 ENCOUNTER — LAB VISIT (OUTPATIENT)
Dept: LAB | Facility: HOSPITAL | Age: 35
End: 2025-04-08
Payer: COMMERCIAL

## 2025-04-08 DIAGNOSIS — R10.9 ABDOMINAL PAIN, UNSPECIFIED ABDOMINAL LOCATION: ICD-10-CM

## 2025-04-08 PROCEDURE — 87338 HPYLORI STOOL AG IA: CPT

## 2025-04-09 ENCOUNTER — OFFICE VISIT (OUTPATIENT)
Dept: GASTROENTEROLOGY | Facility: CLINIC | Age: 35
End: 2025-04-09
Payer: COMMERCIAL

## 2025-04-09 VITALS
HEIGHT: 61 IN | WEIGHT: 140.63 LBS | BODY MASS INDEX: 26.55 KG/M2 | DIASTOLIC BLOOD PRESSURE: 90 MMHG | HEART RATE: 99 BPM | SYSTOLIC BLOOD PRESSURE: 139 MMHG

## 2025-04-09 DIAGNOSIS — Z86.19 HISTORY OF HELICOBACTER PYLORI INFECTION: ICD-10-CM

## 2025-04-09 DIAGNOSIS — R10.9 ABDOMINAL CRAMPING: ICD-10-CM

## 2025-04-09 DIAGNOSIS — R10.9 ABDOMINAL PAIN, UNSPECIFIED ABDOMINAL LOCATION: ICD-10-CM

## 2025-04-09 DIAGNOSIS — K58.0 IRRITABLE BOWEL SYNDROME WITH DIARRHEA: Primary | ICD-10-CM

## 2025-04-09 DIAGNOSIS — R14.0 ABDOMINAL BLOATING: ICD-10-CM

## 2025-04-09 LAB
H. PYLORI SURFACE ANTIGEN, INTERPRETATION (OHS): NEGATIVE
HELICOBACTER PYLORI SURFACE ANTIGEN (OHS): 0.11

## 2025-04-09 PROCEDURE — 3080F DIAST BP >= 90 MM HG: CPT | Mod: CPTII,S$GLB,,

## 2025-04-09 PROCEDURE — 99999 PR PBB SHADOW E&M-EST. PATIENT-LVL V: CPT | Mod: PBBFAC,,,

## 2025-04-09 PROCEDURE — 3075F SYST BP GE 130 - 139MM HG: CPT | Mod: CPTII,S$GLB,,

## 2025-04-09 PROCEDURE — 99214 OFFICE O/P EST MOD 30 MIN: CPT | Mod: S$GLB,,,

## 2025-04-09 PROCEDURE — 3008F BODY MASS INDEX DOCD: CPT | Mod: CPTII,S$GLB,,

## 2025-04-09 PROCEDURE — 3044F HG A1C LEVEL LT 7.0%: CPT | Mod: CPTII,S$GLB,,

## 2025-04-09 PROCEDURE — 1159F MED LIST DOCD IN RCRD: CPT | Mod: CPTII,S$GLB,,

## 2025-04-09 PROCEDURE — 1160F RVW MEDS BY RX/DR IN RCRD: CPT | Mod: CPTII,S$GLB,,

## 2025-04-09 RX ORDER — DICYCLOMINE HYDROCHLORIDE 10 MG/1
10 CAPSULE ORAL 3 TIMES DAILY PRN
Qty: 30 CAPSULE | Refills: 0 | Status: SHIPPED | OUTPATIENT
Start: 2025-04-09

## 2025-04-09 NOTE — PROGRESS NOTES
Gastroenterology Clinic Consultation Note    Reason for Visit:  The primary encounter diagnosis was Irritable bowel syndrome with diarrhea. Diagnoses of History of Helicobacter pylori infection, Abdominal cramping, Abdominal bloating, and Abdominal pain, unspecified abdominal location were also pertinent to this visit.    PCP:   Wang Salvador.         Initial HPI   This is a 34 y.o. female presenting for IBS D, abdominal bloating, abdominal cramping, abdominal pain  Patient in clinic with the above complaints.  She was diagnosed with IBS years ago.  She reports these symptoms have been ongoing for most of her life.  Upper and lower endoscopies done in 2023 repeat colonoscopy in 5 years.  H pylori testing done yesterday negative.  Lab work done in January of this year unremarkable.  Stool studies done in 2023 negative.  Grandfather diagnosed with colon cancer in his 50s.  Father has had colon polyps in addition to diverticulosis with frequent flares of diverticulitis.  She was diagnosed with H pylori in 2023 by biopsy with EGD.  She reports she has tried the low FODMAP diet, and probiotics for IBS symptoms.  She underwent a trial of rifaximin in February of this year for suspected SIBO.  She reports no symptom relief with rifaximin trial.  No recent abdominal imaging.Denies unintentional weight loss, fever, chills, nausea, vomiting, constipation, esophageal reflux, regurgitation, hematemesis, difficulties swallowing, changes in bowel habits, changes in stool caliber, blood in stool, and melena.      ROS:  Review of Systems   Constitutional:  Negative for chills, fever, malaise/fatigue and weight loss.   Respiratory:  Negative for shortness of breath.    Cardiovascular:  Negative for chest pain.   Gastrointestinal:  Positive for diarrhea. Negative for abdominal pain, blood in stool, constipation, heartburn, melena, nausea and vomiting.   Genitourinary:  Negative for hematuria.   Neurological:  Negative for  "dizziness and weakness.        Medical History:  has a past medical history of ADD (attention deficit disorder), Anxiety, and History of panic attacks.    Surgical History:  has a past surgical history that includes Tonsillectomy (); [place] tooth extraction (); LASIK (Bilateral, );  section (N/A, 2021); Colonoscopy (N/A, 2023); Esophagogastroduodenoscopy (N/A, 2023); and  section (N/A, 2024).    Family History: family history includes Cancer in her paternal grandfather; Colon cancer in her paternal grandfather; Colon polyps in her father; Hyperlipidemia in her mother; Schizophrenia in her mother; Skin cancer in her mother..       Review of patient's allergies indicates:   Allergen Reactions    Bactrim [sulfamethoxazole-trimethoprim] Itching       Medications Ordered Prior to Encounter[1]      Objective Findings:    Vital Signs:  BP (!) 139/90   Pulse 99   Ht 5' 1" (1.549 m)   Wt 63.8 kg (140 lb 10.5 oz)   BMI 26.58 kg/m²   Body mass index is 26.58 kg/m².    Physical Exam:  Physical Exam  Vitals and nursing note reviewed.   Constitutional:       General: She is not in acute distress.     Appearance: Normal appearance. She is not ill-appearing.   HENT:      Head: Normocephalic and atraumatic.      Right Ear: External ear normal.      Left Ear: External ear normal.      Nose: Nose normal.   Eyes:      General: No scleral icterus.     Extraocular Movements: Extraocular movements intact.   Cardiovascular:      Rate and Rhythm: Normal rate.   Pulmonary:      Effort: Pulmonary effort is normal. No respiratory distress.   Abdominal:      General: There is no distension.      Palpations: Abdomen is soft.      Tenderness: There is no guarding.   Musculoskeletal:         General: Normal range of motion.      Cervical back: Normal range of motion.   Skin:     General: Skin is warm.   Neurological:      Mental Status: She is alert and oriented to person, place, and time. "   Psychiatric:         Mood and Affect: Mood normal.         Behavior: Behavior is cooperative.         Thought Content: Thought content normal.             Labs:  Lab Results   Component Value Date    WBC 9.47 01/28/2025    HGB 14.0 01/28/2025    HCT 42.5 01/28/2025     01/28/2025    CHOL 176 01/28/2025    TRIG 79 01/28/2025    HDL 58 01/28/2025    ALKPHOS 65 01/28/2025    LIPASE 19 11/15/2023    ALT 17 01/28/2025    AST 16 01/28/2025     01/28/2025    K 4.0 01/28/2025     01/28/2025    CREATININE 0.7 01/28/2025    BUN 13 01/28/2025    CO2 23 01/28/2025    TSH 1.288 01/28/2025    HGBA1C 4.9 01/28/2025       Imaging reviewed: NA      Endoscopy reviewed:   Findings:       The perianal and digital rectal examinations were normal.        The terminal ileum appeared normal.        A 3 mm polyp was found in the rectum. The polyp was sessile. The        polyp was removed with a cold snare. Resection and retrieval were        complete. Verification of patient identification for the specimen        was done. Estimated blood loss was minimal.        The exam was otherwise without abnormality.        Biopsies for histology were taken with a cold forceps from the        ascending colon, transverse colon, descending colon, sigmoid colon        and rectum for evaluation of microscopic colitis. Verification of        patient identification for the specimen was done. Estimated blood        loss was minimal.   Impression:            - The examined portion of the ileum was normal.                          - One 3 mm polyp in the rectum, removed with a                          cold snare. Resected and retrieved.                          - The examination was otherwise normal.                          - Biopsies were taken with a cold forceps from the                          ascending colon, transverse colon, descending                          colon, sigmoid colon and rectum for evaluation of                           microscopic colitis.   Recommendation:        - Discharge patient to home.                          - Resume previous diet.                          - Continue present medications.                          - Await pathology results.                          - Repeat colonoscopy in 5 years for surveillance.                          - Patient has a contact number available for                          emergencies. The signs and symptoms of potential                          delayed complications were discussed with the                          patient. Return to normal activities tomorrow.                          Written discharge instructions were provided to                          the patient.   Brayan Ledezma MD   12/4/2023 3:37:18 PM     1. Duodenum, biopsy:   Duodenal mucosa with well-preserved villous architecture and no significant histopathologic abnormality.    No evidence of celiac disease.     2. Stomach, biopsy:   Helicobacter pylori associated chronic active gastritis, mild.    Helicobacter immunohistochemical stain is positive for H. pylori.    No intestinal metaplasia or dysplasia.     3. Colon, biopsy:   Colonic mucosa with no significant histopathologic abnormality.     4. Rectum, biopsy:   Tubular adenoma.     Findings:       The esophagus was normal.        The stomach was normal. Biopsies were taken with a cold forceps for        Helicobacter pylori testing. Verification of patient identification        for the specimen was done. Estimated blood loss was minimal.        The examined duodenum was normal. Biopsies for histology were taken        with a cold forceps for evaluation of celiac disease. Verification        of patient identification for the specimen was done. Estimated blood        loss was minimal.   Impression:            - Normal esophagus.                          - Normal stomach. Biopsied.                          - Normal examined duodenum. Biopsied.    Recommendation:        - Discharge patient to home.                          - Resume previous diet.                          - Continue present medications.                          - Await pathology results.                          - Perform a colonoscopy as previously scheduled.   Brayan Ledezma MD   12/4/2023 3:20:16 PM       Assessment:  1. Irritable bowel syndrome with diarrhea    2. History of Helicobacter pylori infection    3. Abdominal cramping    4. Abdominal bloating    5. Abdominal pain, unspecified abdominal location      Orders Placed This Encounter    US Abdomen Complete    Ambulatory referral/consult to Nutrition Services    dicyclomine (BENTYL) 10 MG capsule     Plan:  Referral to GI dietitian  Ultrasound abdomen to rule out acute etiology of abdominal pain  Bentyl as needed for diarrhea and abdominal pain  Return to clinic based on ultrasound findings or if symptoms worsen or persist    OTC medications for bloating: Gas-X, Gaviscon, CharcoCaps, IB Jillian, Simethicone    Diet: Eat and drink more slowly to swallow less air. Limit fatty and spicy foods. Avoid caffeine, carbonated drinks, and artificial sweeteners. Avoid common gas-causing foods, such as beans, peas, lentils, cabbage, onions, broccoli, cauliflower, and whole grains. Try removing one food at a time from your diet to see if your gas improves.  Fiber: Fiber has many benefits, although too much fiber may increase the amount of gas in your intestines.  Exercise: Regular daily exercise often reduces symptoms in the stomach and intestines.  Probiotics: Probiotics are found in some over-the-counter supplements and yogurts. Common probiotics are Lactobacillus and Bifidobacterium.     Thank you for allowing me to participate in this patient's care.    Sincerely,     SHIMON NEGRETE-C  Gastroenterology Department  [facility] [phone]           [1]   Current Outpatient Medications on File Prior  to Visit   Medication Sig Dispense Refill    cetirizine (ZYRTEC) 10 MG tablet Take 10 mg by mouth once daily.      dextroamphetamine-amphetamine 10 mg Tab Take 1 tablet by mouth 2 (two) times daily.      LORazepam (ATIVAN) 1 MG tablet Take 0.5 mg by mouth once daily. Daily prn      sertraline (ZOLOFT) 50 MG tablet Take 50 mg by mouth once daily.      tretinoin (RETIN-A) 0.025 % cream Compound tretinoin 0.025% / azelaic acid 8% / niacinamide 2% cream. Apply a pea-sized amount to entire face qhs. 30 g 5    acyclovir 5% (ZOVIRAX) 5 % ointment Apply topically every 3 (three) hours. (Patient taking differently: Apply topically as needed.) 30 g 2    valACYclovir (VALTREX) 1000 MG tablet Take 2 tablets (2,000 mg total) by mouth 2 (two) times daily as needed (for 1 day as needed for cold sore). 60 tablet 1    [DISCONTINUED] drospirenone, contraceptive, (SLYND) 4 mg (28) Tab Take 1 tablet (4 mg total) by mouth Daily. 28 tablet 11     No current facility-administered medications on file prior to visit.

## 2025-04-10 ENCOUNTER — RESULTS FOLLOW-UP (OUTPATIENT)
Dept: GASTROENTEROLOGY | Facility: HOSPITAL | Age: 35
End: 2025-04-10

## 2025-04-11 ENCOUNTER — PATIENT MESSAGE (OUTPATIENT)
Dept: GASTROENTEROLOGY | Facility: CLINIC | Age: 35
End: 2025-04-11
Payer: COMMERCIAL

## 2025-04-11 ENCOUNTER — HOSPITAL ENCOUNTER (OUTPATIENT)
Dept: RADIOLOGY | Facility: HOSPITAL | Age: 35
Discharge: HOME | End: 2025-04-11
Payer: COMMERCIAL

## 2025-04-11 DIAGNOSIS — Z86.19 HISTORY OF HELICOBACTER PYLORI INFECTION: ICD-10-CM

## 2025-04-11 DIAGNOSIS — R14.0 ABDOMINAL BLOATING: ICD-10-CM

## 2025-04-11 DIAGNOSIS — R10.9 ABDOMINAL PAIN, UNSPECIFIED ABDOMINAL LOCATION: ICD-10-CM

## 2025-04-11 DIAGNOSIS — K58.0 IRRITABLE BOWEL SYNDROME WITH DIARRHEA: ICD-10-CM

## 2025-04-11 DIAGNOSIS — R10.9 ABDOMINAL CRAMPING: ICD-10-CM

## 2025-04-11 DIAGNOSIS — K80.20 GALLSTONES: Primary | ICD-10-CM

## 2025-04-11 PROCEDURE — 76700 US EXAM ABDOM COMPLETE: CPT | Mod: TC

## 2025-04-11 PROCEDURE — 76700 US EXAM ABDOM COMPLETE: CPT | Mod: 26,,, | Performed by: RADIOLOGY

## 2025-04-14 ENCOUNTER — PATIENT MESSAGE (OUTPATIENT)
Dept: PRIMARY CARE CLINIC | Facility: CLINIC | Age: 35
End: 2025-04-14
Payer: COMMERCIAL

## 2025-04-14 DIAGNOSIS — R10.2 PELVIC PAIN: Primary | ICD-10-CM

## 2025-04-16 ENCOUNTER — HOSPITAL ENCOUNTER (OUTPATIENT)
Dept: RADIOLOGY | Facility: OTHER | Age: 35
Discharge: HOME | End: 2025-04-16
Attending: INTERNAL MEDICINE
Payer: COMMERCIAL

## 2025-04-16 ENCOUNTER — PATIENT MESSAGE (OUTPATIENT)
Dept: PRIMARY CARE CLINIC | Facility: CLINIC | Age: 35
End: 2025-04-16
Payer: COMMERCIAL

## 2025-04-16 ENCOUNTER — RESULTS FOLLOW-UP (OUTPATIENT)
Dept: PRIMARY CARE CLINIC | Facility: CLINIC | Age: 35
End: 2025-04-16

## 2025-04-16 DIAGNOSIS — R10.2 PELVIC PAIN: ICD-10-CM

## 2025-04-16 PROCEDURE — 76830 TRANSVAGINAL US NON-OB: CPT | Mod: 26,,, | Performed by: RADIOLOGY

## 2025-04-16 PROCEDURE — 76830 TRANSVAGINAL US NON-OB: CPT | Mod: TC

## 2025-04-16 PROCEDURE — 76856 US EXAM PELVIC COMPLETE: CPT | Mod: 26,,, | Performed by: RADIOLOGY

## 2025-04-16 NOTE — TELEPHONE ENCOUNTER
Called and spoke to pt. Rescheduled pt for pelvic US today. Reviewed reasons to go to ED with pt, pt verbalized understanding. No further needs.

## 2025-04-21 ENCOUNTER — HOSPITAL ENCOUNTER (EMERGENCY)
Facility: OTHER | Age: 35
Discharge: HOME | End: 2025-04-21
Attending: EMERGENCY MEDICINE
Payer: COMMERCIAL

## 2025-04-21 ENCOUNTER — OFFICE VISIT (OUTPATIENT)
Dept: URGENT CARE | Facility: CLINIC | Age: 35
End: 2025-04-21
Payer: COMMERCIAL

## 2025-04-21 VITALS
SYSTOLIC BLOOD PRESSURE: 109 MMHG | OXYGEN SATURATION: 100 % | DIASTOLIC BLOOD PRESSURE: 61 MMHG | HEIGHT: 61 IN | TEMPERATURE: 100 F | HEART RATE: 100 BPM | BODY MASS INDEX: 26.43 KG/M2 | WEIGHT: 140 LBS | RESPIRATION RATE: 20 BRPM

## 2025-04-21 VITALS
DIASTOLIC BLOOD PRESSURE: 85 MMHG | RESPIRATION RATE: 18 BRPM | OXYGEN SATURATION: 98 % | WEIGHT: 140 LBS | BODY MASS INDEX: 26.45 KG/M2 | TEMPERATURE: 99 F | SYSTOLIC BLOOD PRESSURE: 122 MMHG | HEART RATE: 89 BPM

## 2025-04-21 DIAGNOSIS — R31.9 HEMATURIA, UNSPECIFIED TYPE: ICD-10-CM

## 2025-04-21 DIAGNOSIS — R11.2 NAUSEA AND VOMITING, UNSPECIFIED VOMITING TYPE: ICD-10-CM

## 2025-04-21 DIAGNOSIS — E83.51 HYPOCALCEMIA: ICD-10-CM

## 2025-04-21 DIAGNOSIS — R19.7 DIARRHEA, UNSPECIFIED TYPE: ICD-10-CM

## 2025-04-21 DIAGNOSIS — R19.7 DIARRHEA, UNSPECIFIED TYPE: Primary | ICD-10-CM

## 2025-04-21 DIAGNOSIS — R10.9 RIGHT SIDED ABDOMINAL PAIN: Primary | ICD-10-CM

## 2025-04-21 LAB
ABSOLUTE EOSINOPHIL (OHS): 0.03 K/UL
ABSOLUTE MONOCYTE (OHS): 0.31 K/UL (ref 0.3–1)
ABSOLUTE NEUTROPHIL COUNT (OHS): 3.97 K/UL (ref 1.8–7.7)
ALBUMIN SERPL BCP-MCNC: 3.6 G/DL (ref 3.5–5.2)
ALP SERPL-CCNC: 49 UNIT/L (ref 40–150)
ALT SERPL W/O P-5'-P-CCNC: 21 UNIT/L (ref 10–44)
ANION GAP (OHS): 9 MMOL/L (ref 8–16)
AST SERPL-CCNC: 19 UNIT/L (ref 11–45)
B-HCG UR QL: NEGATIVE
B-HCG UR QL: NEGATIVE
BASOPHILS # BLD AUTO: 0.02 K/UL
BASOPHILS NFR BLD AUTO: 0.4 %
BILIRUB SERPL-MCNC: 0.6 MG/DL (ref 0.1–1)
BILIRUBIN, UA POC OHS: NEGATIVE
BLOOD, UA POC OHS: ABNORMAL
BUN SERPL-MCNC: 5 MG/DL (ref 6–20)
CALCIUM SERPL-MCNC: 7.9 MG/DL (ref 8.7–10.5)
CHLORIDE SERPL-SCNC: 109 MMOL/L (ref 95–110)
CLARITY, UA POC OHS: CLEAR
CO2 SERPL-SCNC: 17 MMOL/L (ref 23–29)
COLOR, UA POC OHS: COLORLESS
CREAT SERPL-MCNC: 0.7 MG/DL (ref 0.5–1.4)
CTP QC/QA: YES
ERYTHROCYTE [DISTWIDTH] IN BLOOD BY AUTOMATED COUNT: 12.6 % (ref 11.5–14.5)
GFR SERPLBLD CREATININE-BSD FMLA CKD-EPI: >60 ML/MIN/1.73/M2
GLUCOSE SERPL-MCNC: 102 MG/DL (ref 70–110)
GLUCOSE, UA POC OHS: NEGATIVE
HCT VFR BLD AUTO: 40.5 % (ref 37–48.5)
HGB BLD-MCNC: 14 GM/DL (ref 12–16)
IMM GRANULOCYTES # BLD AUTO: 0.01 K/UL (ref 0–0.04)
IMM GRANULOCYTES NFR BLD AUTO: 0.2 % (ref 0–0.5)
KETONES, UA POC OHS: NEGATIVE
LEUKOCYTES, UA POC OHS: NEGATIVE
LIPASE SERPL-CCNC: 19 U/L (ref 4–60)
LYMPHOCYTES # BLD AUTO: 0.57 K/UL (ref 1–4.8)
MAGNESIUM SERPL-MCNC: 1.9 MG/DL (ref 1.6–2.6)
MCH RBC QN AUTO: 31.9 PG (ref 27–31)
MCHC RBC AUTO-ENTMCNC: 34.6 G/DL (ref 32–36)
MCV RBC AUTO: 92 FL (ref 82–98)
NITRITE, UA POC OHS: NEGATIVE
NUCLEATED RBC (/100WBC) (OHS): 0 /100 WBC
PH, UA POC OHS: 6
PLATELET # BLD AUTO: 216 K/UL (ref 150–450)
PMV BLD AUTO: 10.3 FL (ref 9.2–12.9)
POC MOLECULAR INFLUENZA A AGN: NEGATIVE
POC MOLECULAR INFLUENZA B AGN: NEGATIVE
POTASSIUM SERPL-SCNC: 3.8 MMOL/L (ref 3.5–5.1)
PROT SERPL-MCNC: 6.5 GM/DL (ref 6–8.4)
PROTEIN, UA POC OHS: NEGATIVE
RBC # BLD AUTO: 4.39 M/UL (ref 4–5.4)
RELATIVE EOSINOPHIL (OHS): 0.6 %
RELATIVE LYMPHOCYTE (OHS): 11.6 % (ref 18–48)
RELATIVE MONOCYTE (OHS): 6.3 % (ref 4–15)
RELATIVE NEUTROPHIL (OHS): 80.9 % (ref 38–73)
SARS CORONAVIRUS 2 ANTIGEN: NEGATIVE
SODIUM SERPL-SCNC: 135 MMOL/L (ref 136–145)
SPECIFIC GRAVITY, UA POC OHS: 1.01
UROBILINOGEN, UA POC OHS: 0.2
WBC # BLD AUTO: 4.91 K/UL (ref 3.9–12.7)

## 2025-04-21 PROCEDURE — 96375 TX/PRO/DX INJ NEW DRUG ADDON: CPT

## 2025-04-21 PROCEDURE — 80053 COMPREHEN METABOLIC PANEL: CPT

## 2025-04-21 PROCEDURE — 99284 EMERGENCY DEPT VISIT MOD MDM: CPT | Mod: 25

## 2025-04-21 PROCEDURE — 83690 ASSAY OF LIPASE: CPT

## 2025-04-21 PROCEDURE — 63600175 PHARM REV CODE 636 W HCPCS

## 2025-04-21 PROCEDURE — 99213 OFFICE O/P EST LOW 20 MIN: CPT | Mod: S$GLB,,, | Performed by: PHYSICIAN ASSISTANT

## 2025-04-21 PROCEDURE — 25000003 PHARM REV CODE 250

## 2025-04-21 PROCEDURE — 87177 OVA AND PARASITES SMEARS: CPT

## 2025-04-21 PROCEDURE — 81003 URINALYSIS AUTO W/O SCOPE: CPT | Mod: QW,S$GLB,, | Performed by: PHYSICIAN ASSISTANT

## 2025-04-21 PROCEDURE — 81025 URINE PREGNANCY TEST: CPT | Mod: S$GLB,,, | Performed by: PHYSICIAN ASSISTANT

## 2025-04-21 PROCEDURE — 87811 SARS-COV-2 COVID19 W/OPTIC: CPT | Mod: QW,S$GLB,, | Performed by: PHYSICIAN ASSISTANT

## 2025-04-21 PROCEDURE — 93005 ELECTROCARDIOGRAM TRACING: CPT

## 2025-04-21 PROCEDURE — 85025 COMPLETE CBC W/AUTO DIFF WBC: CPT

## 2025-04-21 PROCEDURE — 87502 INFLUENZA DNA AMP PROBE: CPT | Mod: QW,S$GLB,, | Performed by: PHYSICIAN ASSISTANT

## 2025-04-21 PROCEDURE — 81025 URINE PREGNANCY TEST: CPT

## 2025-04-21 PROCEDURE — 83735 ASSAY OF MAGNESIUM: CPT

## 2025-04-21 PROCEDURE — 96365 THER/PROPH/DIAG IV INF INIT: CPT

## 2025-04-21 PROCEDURE — 96361 HYDRATE IV INFUSION ADD-ON: CPT

## 2025-04-21 RX ORDER — LOPERAMIDE HYDROCHLORIDE 2 MG/1
2 CAPSULE ORAL 4 TIMES DAILY PRN
Qty: 12 CAPSULE | Refills: 0 | Status: SHIPPED | OUTPATIENT
Start: 2025-04-21 | End: 2025-04-26

## 2025-04-21 RX ORDER — CALCIUM GLUCONATE 20 MG/ML
1 INJECTION, SOLUTION INTRAVENOUS
Status: COMPLETED | OUTPATIENT
Start: 2025-04-21 | End: 2025-04-21

## 2025-04-21 RX ORDER — HYOSCYAMINE SULFATE 0.5 MG/ML
0.25 INJECTION, SOLUTION SUBCUTANEOUS
Status: COMPLETED | OUTPATIENT
Start: 2025-04-21 | End: 2025-04-21

## 2025-04-21 RX ORDER — ONDANSETRON HYDROCHLORIDE 2 MG/ML
8 INJECTION, SOLUTION INTRAVENOUS
Status: COMPLETED | OUTPATIENT
Start: 2025-04-21 | End: 2025-04-21

## 2025-04-21 RX ORDER — ONDANSETRON 4 MG/1
4 TABLET, ORALLY DISINTEGRATING ORAL EVERY 6 HOURS PRN
Qty: 20 TABLET | Refills: 0 | Status: SHIPPED | OUTPATIENT
Start: 2025-04-21 | End: 2025-04-29

## 2025-04-21 RX ORDER — HYOSCYAMINE SULFATE 0.12 MG/1
0.12 TABLET SUBLINGUAL EVERY 4 HOURS PRN
Qty: 20 TABLET | Refills: 0 | Status: SHIPPED | OUTPATIENT
Start: 2025-04-21 | End: 2025-04-26

## 2025-04-21 RX ADMIN — CALCIUM GLUCONATE 1 G: 20 INJECTION, SOLUTION INTRAVENOUS at 09:04

## 2025-04-21 RX ADMIN — SODIUM CHLORIDE 1000 ML: 0.9 INJECTION, SOLUTION INTRAVENOUS at 08:04

## 2025-04-21 RX ADMIN — ONDANSETRON 8 MG: 2 INJECTION INTRAMUSCULAR; INTRAVENOUS at 08:04

## 2025-04-21 RX ADMIN — HYOSCYAMINE SULFATE 0.25 MG: 0.5 INJECTION, SOLUTION SUBCUTANEOUS at 08:04

## 2025-04-21 NOTE — PROGRESS NOTES
Subjective:      Patient ID: Tamara Aldana is a 34 y.o. female.    Vitals:  weight is 63.5 kg (140 lb). Her oral temperature is 98.9 °F (37.2 °C). Her blood pressure is 122/85 and her pulse is 89. Her respiration is 18 and oxygen saturation is 98%.     Chief Complaint: Abdominal Pain    34 y.o female presents to urgent care with chief complaint of generalized abdominal pain, diarrhea, sweating, chills, lightheadedness and lower back pain x yesterday evening after having raw sushi for lunch. She mentions she felt worse today morning and that is when she started having episodes of diarrhea. She mentions last diarrhea episode in clinic today and states it has been brown in color. She does not notice any blood in her stool. She states she took three 200 mg tablets of Advil last night and has mild relief of chills. She mentions checking her temperature but does not report a fever. She denies any nausea or vomiting. She denies any shortness of breath, chest pain, palpitation.She denies any fainting spells, LOC, or dizziness. She mentions being sexually active with one male partner in the last 6 mos. She believes she was tested for STDs recently as she gave birth in November but is unsure.  Patient has a significant history of abdominal pain but states it is not like her previous episodes that she has been evaluated for by GI and PCP.      Abdominal Pain  This is a new problem. The current episode started yesterday. The onset quality is sudden. The problem occurs constantly. The problem has been gradually worsening. The pain is located in the epigastric region. The pain is at a severity of 4/10. The pain is mild. The quality of the pain is aching. The abdominal pain does not radiate. Associated symptoms include diarrhea. Pertinent negatives include no anorexia, arthralgias, belching, constipation, dysuria, fever, flatus, frequency, headaches, hematochezia, hematuria, melena, myalgias, nausea, vomiting or weight  loss. Nothing aggravates the pain. The pain is relieved by Nothing. She has tried nothing for the symptoms. The treatment provided no relief. There is no history of abdominal surgery, colon cancer, Crohn's disease, gallstones, GERD, irritable bowel syndrome, pancreatitis, PUD or ulcerative colitis. Patient's medical history does not include kidney stones and UTI.       Constitution: Positive for chills, sweating, fatigue and generalized weakness. Negative for appetite change and fever.   HENT:  Negative for ear pain, congestion, postnasal drip and sore throat.    Neck: Negative for neck pain.   Cardiovascular:  Negative for chest pain and palpitations.   Respiratory:  Negative for cough, sputum production, bloody sputum and shortness of breath.    Gastrointestinal:  Positive for abdominal pain (generalized) and diarrhea. Negative for abdominal bloating, history of abdominal surgery, nausea, vomiting, constipation, bright red blood in stool, dark colored stools and rectal pain.   Genitourinary:  Negative for dysuria, frequency, urgency, flank pain, hematuria, vaginal discharge and vaginal bleeding.   Musculoskeletal:  Positive for pain (lower back pain). Negative for trauma, joint pain, joint swelling, abnormal ROM of joint and muscle ache.   Skin:  Positive for pale. Negative for rash, erythema and bruising.   Neurological:  Negative for dizziness, passing out, headaches and loss of consciousness.      Past Medical History:   Diagnosis Date    ADD (attention deficit disorder)     Anxiety     History of panic attacks        Past Surgical History:   Procedure Laterality Date     SECTION N/A 2021    Procedure:  SECTION;  Surgeon: Julie R. Jeansonne, MD;  Location: [facility] L&D;  Service: OB/GYN;  Laterality: N/A;     SECTION N/A 2024    Procedure:  SECTION;  Surgeon: Megan Coronado MD;  Location: [facility] L&D;  Service: OB/GYN;  Laterality: N/A;    COLONOSCOPY N/A 2023     Procedure: COLONOSCOPY;  Surgeon: Brayan Ledezma MD;  Location: [facility];  Service: Endoscopy;  Laterality: N/A;    ESOPHAGOGASTRODUODENOSCOPY N/A 12/4/2023    Procedure: EGD (ESOPHAGOGASTRODUODENOSCOPY);  Surgeon: Brayan Ledezma MD;  Location: [facility] ENDO;  Service: Endoscopy;  Laterality: N/A;    LASIK Bilateral 2012    TONSILLECTOMY  2014    WISDOM TOOTH EXTRACTION  2011       Family History   Problem Relation Name Age of Onset    Hyperlipidemia Mother      Schizophrenia Mother      Skin cancer Mother          SCC    Colon polyps Father      Colon cancer Paternal Grandfather      Cancer Paternal Grandfather      Breast cancer Neg Hx      Ovarian cancer Neg Hx      Melanoma Neg Hx      Uterine cancer Neg Hx      Cervical cancer Neg Hx         Social History     Socioeconomic History    Marital status:    Occupational History    Occupation: Pharmacist   Tobacco Use    Smoking status: Never     Passive exposure: Never    Smokeless tobacco: Never   Substance and Sexual Activity    Alcohol use: Yes     Comment: rare    Drug use: Never    Sexual activity: Yes     Partners: Male     Birth control/protection: None     Comment: Engaged:   Other Topics Concern    Are you pregnant or think you may be? No    Breast-feeding Yes     Social Drivers of Health     Financial Resource Strain: Low Risk  (4/9/2025)    Overall Financial Resource Strain (CARDIA)     Difficulty of Paying Living Expenses: Not hard at all   Food Insecurity: No Food Insecurity (4/9/2025)    Hunger Vital Sign     Worried About Running Out of Food in the Last Year: Never true     Ran Out of Food in the Last Year: Never true   Transportation Needs: No Transportation Needs (4/9/2025)    PRAPARE - Transportation     Lack of Transportation (Medical): No     Lack of Transportation (Non-Medical): No   Physical Activity: Insufficiently Active (4/9/2025)    Exercise Vital Sign     Days of Exercise per Week: 2 days     Minutes of Exercise  per Session: 30 min   Stress: Stress Concern Present (4/9/2025)    [facility] - Occupational Stress Questionnaire     Feeling of Stress : Rather much   Housing Stability: Low Risk  (4/9/2025)    Housing Stability Vital Sign     Unable to Pay for Housing in the Last Year: No     Homeless in the Last Year: No       Current Medications[1]    Review of patient's allergies indicates:   Allergen Reactions    Bactrim [sulfamethoxazole-trimethoprim] Itching       Objective:     Physical Exam   Constitutional: She is oriented to person, place, and time. She appears well-developed.   HENT:   Head: Normocephalic and atraumatic.   Nose: Nose normal.   Mouth/Throat: Mucous membranes are normal.   Eyes: Conjunctivae and lids are normal. Pupils are equal, round, and reactive to light. No scleral icterus.   Neck: Trachea normal. Neck supple.   Cardiovascular: Normal rate, regular rhythm and normal heart sounds.   No murmur heard.Exam reveals no gallop and no friction rub.   Pulmonary/Chest: Effort normal and breath sounds normal. No stridor. No respiratory distress. She has no wheezes. She has no rhonchi. She has no rales.   Abdominal: Normal appearance and bowel sounds are normal. She exhibits no distension and no mass. Soft. flat abdomen There is generalized abdominal tenderness. There is no rebound, no guarding, no tenderness at McBurney's point, negative Madrid's sign, no left CVA tenderness, negative Rovsing's sign and no right CVA tenderness. No hernia.   Musculoskeletal: Normal range of motion.         General: Tenderness (left lower back area) present. No swelling, deformity or signs of injury. Normal range of motion.        Legs:    Neurological: She is alert and oriented to person, place, and time. She has normal strength.   Skin: Skin is warm, dry, intact, not diaphoretic, not pale and no rash. No bruising and No erythema no jaundice  Psychiatric: Her speech is normal and behavior is normal.  Mood, judgment and thought content normal.   Nursing note and vitals reviewed.    Results for orders placed or performed in visit on 04/21/25   SARS Coronavirus 2 Antigen, POCT Manual Read    Collection Time: 04/21/25  9:18 AM   Result Value Ref Range    SARS Coronavirus 2 Antigen Negative Negative, Presumptive Negative     Acceptable Yes    POCT Influenza A/B MOLECULAR    Collection Time: 04/21/25  9:30 AM   Result Value Ref Range    POC Molecular Influenza A Ag Negative Negative    POC Molecular Influenza B Ag Negative Negative     Acceptable Yes    POCT urine pregnancy    Collection Time: 04/21/25  9:56 AM   Result Value Ref Range    POC Preg Test, Ur Negative Negative     Acceptable Yes    POCT Urinalysis(Instrument)    Collection Time: 04/21/25  9:57 AM   Result Value Ref Range    Color, POC UA Colorless Yellow, Straw, Colorless    Clarity, POC UA Clear Clear    Glucose, POC UA Negative Negative    Bilirubin, POC UA Negative Negative    Ketones, POC UA Negative Negative    Spec Grav POC UA 1.010 1.005 - 1.030    Blood, POC UA Trace-intact (A) Negative    pH, POC UA 6.0 5.0 - 8.0    Protein, POC UA Negative Negative    Urobilinogen, POC UA 0.2 <=1.0    Nitrite, POC UA Negative Negative    WBC, POC UA Negative Negative         1. Diarrhea, unspecified type    2. Hematuria, unspecified type      1. Body aches        Plan:   Diarrhea, unspecified type  -     SARS Coronavirus 2 Antigen, POCT Manual Read  -     POCT Influenza A/B MOLECULAR  -     POCT urine pregnancy  -     POCT Urinalysis(Instrument)    Hematuria, unspecified type    -     SARS Coronavirus 2 Antigen, POCT Manual Read    Results reviewed  I have reviewed the patient chart and pertinent past imaging/labs.  Pa-student leonarda luna  Patient advised hematuria be an incidental finding she is currently asymptomatic however I advised her to follow up with her primary care for repeat testing once symptoms have  improved.  Patient has good understanding    Patient Instructions     Patient Instructions   Drink plenty of water and use the BRAT diet to help aid in your food ingestion. Bananas, rice, apples, toast.  It is most likely self-limiting if your symptoms do not go away within 7 days please return for stool studies as we discussed.  Wash your hands thoroughly after having a bowel movement this is contagious. Your body natural response to flush it out of the system via diarrhea. Do not take Immodium take Peptobismol to help aid the diarrhea.  This can turn your stool and tongue black and will go away after discontinuing Pepto-Bismol.  Follow up as needed or go to the ER if your symptoms worsen.                        [1]   Current Outpatient Medications   Medication Sig Dispense Refill    acyclovir 5% (ZOVIRAX) 5 % ointment Apply topically every 3 (three) hours. (Patient taking differently: Apply topically as needed.) 30 g 2    cetirizine (ZYRTEC) 10 MG tablet Take 10 mg by mouth once daily.      dextroamphetamine-amphetamine 10 mg Tab Take 1 tablet by mouth 2 (two) times daily.      dicyclomine (BENTYL) 10 MG capsule Take 1 capsule (10 mg total) by mouth 3 (three) times daily as needed (abdominal cramping). 30 capsule 0    LORazepam (ATIVAN) 1 MG tablet Take 0.5 mg by mouth once daily. Daily prn      sertraline (ZOLOFT) 50 MG tablet Take 50 mg by mouth once daily.      tretinoin (RETIN-A) 0.025 % cream Compound tretinoin 0.025% / azelaic acid 8% / niacinamide 2% cream. Apply a pea-sized amount to entire face qhs. 30 g 5    valACYclovir (VALTREX) 1000 MG tablet Take 2 tablets (2,000 mg total) by mouth 2 (two) times daily as needed (for 1 day as needed for cold sore). 60 tablet 1     No current facility-administered medications for this visit.

## 2025-04-21 NOTE — PATIENT INSTRUCTIONS
Patient Instructions   Drink plenty of water and use the BRAT diet to help aid in your food ingestion. Bananas, rice, apples, toast.  It is most likely self-limiting if your symptoms do not go away within 7 days please return for stool studies as we discussed.  Wash your hands thoroughly after having a bowel movement this is contagious. Your body natural response to flush it out of the system via diarrhea. Do not take Immodium take Peptobismol to help aid the diarrhea.  This can turn your stool and tongue black and will go away after discontinuing Pepto-Bismol.  Follow up as needed or go to the ER if your symptoms worsen.

## 2025-04-21 NOTE — PROGRESS NOTES
Subjective:      Patient ID: Tamara Aldana is a 34 y.o. female.    Vitals:  weight is 63.5 kg (140 lb). Her oral temperature is 98.9 °F (37.2 °C). Her blood pressure is 122/85 and her pulse is 89. Her respiration is 18 and oxygen saturation is 98%.     Chief Complaint: Abdominal Pain    This is a 34 y.o. female who presents today with a chief complaint of  body aches, sweats, abdominal pain that started last night.    Abdominal Pain  This is a new problem. The current episode started yesterday. The onset quality is sudden. The problem occurs constantly. The problem has been gradually worsening. The pain is located in the epigastric region. The pain is at a severity of 4/10. The pain is mild. The quality of the pain is aching. The abdominal pain does not radiate. Pertinent negatives include no anorexia, arthralgias, belching, constipation, diarrhea, dysuria, fever, flatus, frequency, headaches, hematochezia, hematuria, melena, myalgias, nausea, vomiting or weight loss. Nothing aggravates the pain. The pain is relieved by Nothing. She has tried nothing for the symptoms. The treatment provided no relief. There is no history of abdominal surgery, colon cancer, Crohn's disease, gallstones, GERD, irritable bowel syndrome, pancreatitis, PUD or ulcerative colitis. Patient's medical history does not include kidney stones and UTI.     Constitution: Negative for fever.   Gastrointestinal:  Positive for abdominal pain. Negative for history of abdominal surgery, nausea, vomiting, constipation, diarrhea and bright red blood in stool.   Genitourinary:  Negative for dysuria, frequency and hematuria.   Musculoskeletal:  Negative for joint pain and muscle ache.   Neurological:  Negative for headaches.    Objective:     Physical Exam    Assessment:     1. Body aches        Plan:       Body aches  -     SARS Coronavirus 2 Antigen, POCT Manual Read    Results reviewed  I have reviewed the patient chart and pertinent past  imaging/labs.  Pa-student leonarda luna

## 2025-04-21 NOTE — LETTER
April 21, 2025      [facility] [place]  [address] [place] [address]  Phone: [phone]  Fax: [phone]       Patient: Tamara Aldana   YOB: 1990  Date of Visit: 04/21/2025    To Whom It May Concern:    Jamel Aldana  was at [facility] on 04/21/2025. The patient may return to work/school on 04/22/2025 with no restrictions if symptom improvement. If you have any questions or concerns, or if I can be of further assistance, please do not hesitate to contact me.    Sincerely,      Trish Mccarty PA-C

## 2025-04-22 NOTE — ED PROVIDER NOTES
"Encounter Date: 2025       History     Chief Complaint   Patient presents with    Abdominal Pain     RUQ pain w/ NVD since yesterday evening   Unable to keep anything down   Attempted otc medication pta but still no relief      Tamara Aldana is a 34 y.o. female presenting to the emergency department for evaluation of non radiating, right-sided, abdominal cramping that began yesterday evening. She also reports multiple episodes of non bloody diarrhea that began yesterday evening. States that she started to experience nausea and few episodes of non bloody vomiting today. She thinks that her symptoms may be attributed to eating raw sushi for lunch yesterday afternoon. She does report recent diagnosis of gallstones. Has history of C-sections. She denies recent use of antibiotics. No recent use of alcohol, tobacco or illicit substances.  She states that she did present to urgent care earlier today and was diagnosed with "viral gastroenteritis." She states that they did not treat her with any medications and did not prescribe her any medications.  States that she was advised to present to the ER if her symptoms persisted or worsened. She denies fever, headache, dizziness, back pain, flank pain, SOB, chest pain, blood in stool, or urinary symptoms.       The history is provided by the patient ( at bedside).     Review of patient's allergies indicates:   Allergen Reactions    Bactrim [sulfamethoxazole-trimethoprim] Itching     Past Medical History:   Diagnosis Date    ADD (attention deficit disorder)     Anxiety     History of panic attacks      Past Surgical History:   Procedure Laterality Date     SECTION N/A 2021    Procedure:  SECTION;  Surgeon: Julie R. Jeansonne, MD;  Location: [facility] L&D;  Service: OB/GYN;  Laterality: N/A;     SECTION N/A 2024    Procedure:  SECTION;  Surgeon: Megan Coronado MD;  Location: [facility] L&D;  Service: OB/GYN;  Laterality: N/A; "    COLONOSCOPY N/A 12/4/2023    Procedure: COLONOSCOPY;  Surgeon: Brayan Ledezma MD;  Location: [facility];  Service: Endoscopy;  Laterality: N/A;    ESOPHAGOGASTRODUODENOSCOPY N/A 12/4/2023    Procedure: EGD (ESOPHAGOGASTRODUODENOSCOPY);  Surgeon: Brayan Ledezma MD;  Location: [facility] ENDO;  Service: Endoscopy;  Laterality: N/A;    LASIK Bilateral 2012    TONSILLECTOMY  2014    WISDOM TOOTH EXTRACTION  2011     Family History   Problem Relation Name Age of Onset    Hyperlipidemia Mother      Schizophrenia Mother      Skin cancer Mother          SCC    Colon polyps Father      Colon cancer Paternal Grandfather      Cancer Paternal Grandfather      Breast cancer Neg Hx      Ovarian cancer Neg Hx      Melanoma Neg Hx      Uterine cancer Neg Hx      Cervical cancer Neg Hx       Social History[1]    Review of Systems  As per HPI.     Physical Exam     Initial Vitals [04/21/25 1940]   BP Pulse Resp Temp SpO2   125/79 (!) 113 17 99.8 °F (37.7 °C) 99 %      MAP       --         Physical Exam    Nursing note and vitals reviewed.  Constitutional: She appears well-developed and well-nourished. No distress.   HENT:   Head: Normocephalic and atraumatic.   Eyes: Conjunctivae and EOM are normal.   Neck: Neck supple.   Normal range of motion.  Cardiovascular:  Normal rate, regular rhythm, normal heart sounds and intact distal pulses.           Pulmonary/Chest: Breath sounds normal. No respiratory distress. She has no wheezes. She has no rhonchi. She has no rales.   Abdominal: Abdomen is soft. Bowel sounds are normal. She exhibits no distension and no mass. There is no abdominal tenderness.   No focal abdominal or pelvic ttp.  There is no rebound and no guarding.   Musculoskeletal:         General: Normal range of motion.      Cervical back: Normal range of motion and neck supple.     Neurological: She is alert and oriented to person, place, and time. She has normal strength.   Skin: Skin is warm and dry.    Psychiatric: She has a normal mood and affect. Her behavior is normal. Judgment and thought content normal.         ED Course   Procedures  Labs Reviewed   COMPREHENSIVE METABOLIC PANEL - Abnormal       Result Value    Sodium 135 (*)     Potassium 3.8      Chloride 109      CO2 17 (*)     Glucose 102      BUN 5 (*)     Creatinine 0.7      Calcium 7.9 (*)     Protein Total 6.5      Albumin 3.6      Bilirubin Total 0.6      ALP 49      AST 19      ALT 21      Anion Gap 9      eGFR >60     CBC WITH DIFFERENTIAL - Abnormal    WBC 4.91      RBC 4.39      HGB 14.0      HCT 40.5      MCV 92      MCH 31.9 (*)     MCHC 34.6      RDW 12.6      Platelet Count 216      MPV 10.3      Nucleated RBC 0      Neut % 80.9 (*)     Lymph % 11.6 (*)     Mono % 6.3      Eos % 0.6      Basophil % 0.4      Imm Grans % 0.2      Neut # 3.97      Lymph # 0.57 (*)     Mono # 0.31      Eos # 0.03      Baso # 0.02      Imm Grans # 0.01     LIPASE - Normal    Lipase Level 19     MAGNESIUM - Normal    Magnesium  1.9     CBC W/ AUTO DIFFERENTIAL    Narrative:     The following orders were created for panel order CBC auto differential.  Procedure                               Abnormality         Status                     ---------                               -----------         ------                     CBC with Differential[[id number]]       Abnormal            Final result                 Please view results for these tests on the individual orders.   STOOL EXAM-OVA,CYSTS,PARASITES   POCT URINE PREGNANCY    POC Preg Test, Ur Negative       Acceptable Yes       EKG Readings: (Independently Interpreted)   Initial Reading: No STEMI.   Normal sinus rhythm at a rate of 98.  No ST or T-wave changes.  Normal axis.  Normal intervals.  No QTC prolongation.       Imaging Results    None          Medications   sodium chloride 0.9% bolus 1,000 mL 1,000 mL (0 mLs Intravenous Stopped 4/21/25 9941)   ondansetron injection 8 mg (8 mg  Intravenous Given 4/21/25 2051)   hyoscyamine injection 0.25 mg (0.25 mg Intravenous Given 4/21/25 2051)   calcium gluconate 1 g in NS IVPB (premixed) (0 g Intravenous Stopped 4/21/25 2255)     Medical Decision Making  Amount and/or Complexity of Data Reviewed  Labs: ordered.    Risk  Prescription drug management.                          Medical Decision Making:   Initial Assessment:   Urgent evaluation of 34-year-old female with no pertinent past medical history presenting with right-sided abdominal cramping and multiple episodes of nonbloody diarrhea that began yesterday evening.  Reports her symptoms began after eating raw sushi for lunch yesterday.  She thinks that the raw sushi may have caused her symptoms.  States that she woke up this morning experiencing nausea and multiple episodes of nonbloody emesis.  States that she tried to take Pepto-Bismol but vomited shortly after.  She denies fever, trouble swallowing, shortness of breath, facial swelling, blood in stool, or urinary symptoms.  On exam, she is uncomfortable appearing but nontoxic.  Hemodynamically stable.  Afebrile in the ED. No focal abdominal or pelvic tenderness to palpation.  Differential Diagnosis:   Differential diagnosis includes but not limited to viral syndrome, gastritis, viral gastroenteritis, food-borne illness  ED Management:  On review of labs, no leukocytosis or anemia.  Normal platelet count.  CMP with sodium of 135.  There is hypocalcemia of 7.9, which was replenished with IV calcium gluconate.  No EKG changes.  No QTC prolongation.  Normal renal function, liver function, and anion gap.  Lipase and magnesium are within normal limits.  UPT is negative.  Stool studies are pending.  I updated the patient on all results.  She did pass her p.o. challenge.  She is feeling much better.  Case was discussed with supervising physician, Dr. Duarte who also has low suspicion for vibrio infection after eating raw sushi, which did not include  oysters. I updated the patient on our discussions. Explained that I suspect food-borne illness versus viral gastroenteritis.  Discharged with prescriptions for Levsin, Zofran, and loperamide.  Advised him to keep a bland diet for the next few days and to stay hydrated with water or sugar free fluids containing electrolytes.  Patient verbalized understanding and agreement with this plan of care. She was given specific return precautions. Advised to follow up with PCP as needed. All questions and concerns addressed. She is stable for discharge.     This note was created with MModal Fluency Direct Dictation. Please excuse any spelling or grammatical errors.             Clinical Impression:  Final diagnoses:  [E83.51] Hypocalcemia  [R10.9] Right sided abdominal pain (Primary)  [R11.2] Nausea and vomiting, unspecified vomiting type  [R19.7] Diarrhea, unspecified type          ED Disposition Condition    Discharge Good          ED Prescriptions       Medication Sig Dispense Start Date End Date Auth. Provider    hyoscyamine 0.125 mg Subl Place 1 tablet (0.125 mg total) under the tongue every 4 (four) hours as needed (abdominal pain/cramping). 20 tablet 4/21/2025 4/26/2025 Jp Ladd PA-C    ondansetron (ZOFRAN-ODT) 4 MG TbDL Take 1 tablet (4 mg total) by mouth every 6 (six) hours as needed (nausea). 20 tablet 4/21/2025 4/26/2025 Jp Ladd PA-C    loperamide (IMODIUM) 2 mg capsule Take 1 capsule (2 mg total) by mouth 4 (four) times daily as needed for Diarrhea. 12 capsule 4/21/2025 4/26/2025 Jp Ladd PA-C          Follow-up Information    None              [1]   Social History  Tobacco Use    Smoking status: Never     Passive exposure: Never    Smokeless tobacco: Never   Substance Use Topics    Alcohol use: Yes     Comment: rare    Drug use: Never        Jp Ladd PA-C  04/22/25 0670

## 2025-04-22 NOTE — ED TRIAGE NOTES
Pt reports RUQ pain with N/V since yesterday. She describes it as a cramping. When the pain gets severe she has episodes of bad diarrhea. She states she feels weak and dehydrated.

## 2025-04-22 NOTE — DISCHARGE INSTRUCTIONS
Take prescribed medications as needed for your symptoms.  Keep a bland diet for the next few days.  Make sure that you are eating cooked foods only.  Make sure that you are staying hydrated with water or sugar free fluids containing electrolytes.

## 2025-04-22 NOTE — FIRST PROVIDER EVALUATION
"Medical screening examination initiated.  I have conducted a focused provider triage encounter, findings are as follows:    Brief history of present illness:  RUQ pain with N/V/D that began last night. Reports eating raw sushi yesterday. Also reports history of gall stones.     Vitals:    04/21/25 1940   BP: 125/79   BP Location: Left arm   Pulse: (!) 113   Resp: 17   Temp: 99.8 °F (37.7 °C)   TempSrc: Oral   SpO2: 99%   Weight: 63.5 kg (140 lb)   Height: 5' 1" (1.549 m)       Pertinent physical exam:  Well appearing. Heart and lungs CTA. Dry mm. Unable to perform abd exam in triage.     Brief workup plan:  Labs.     Preliminary workup initiated; this workup will be continued and followed by the physician or advanced practice provider that is assigned to the patient when roomed.  "

## 2025-04-23 DIAGNOSIS — B00.9 HERPES SIMPLEX INFECTION: Primary | ICD-10-CM

## 2025-04-23 NOTE — TELEPHONE ENCOUNTER
No care due was identified.  [facility] Embedded Care Due Messages. Reference number: [id number].   4/23/2025 5:35:36 PM CDT

## 2025-04-24 RX ORDER — ACYCLOVIR 50 MG/G
OINTMENT TOPICAL
Qty: 30 G | Refills: 2 | Status: SHIPPED | OUTPATIENT
Start: 2025-04-24

## 2025-04-24 RX ORDER — VALACYCLOVIR HYDROCHLORIDE 1 G/1
2000 TABLET, FILM COATED ORAL 2 TIMES DAILY PRN
Qty: 60 TABLET | Refills: 1 | Status: SHIPPED | OUTPATIENT
Start: 2025-04-24 | End: 2026-04-24

## 2025-04-29 ENCOUNTER — OFFICE VISIT (OUTPATIENT)
Dept: SURGERY | Facility: CLINIC | Age: 35
End: 2025-04-29
Payer: COMMERCIAL

## 2025-04-29 VITALS
WEIGHT: 140.19 LBS | OXYGEN SATURATION: 99 % | DIASTOLIC BLOOD PRESSURE: 85 MMHG | HEART RATE: 85 BPM | BODY MASS INDEX: 26.47 KG/M2 | SYSTOLIC BLOOD PRESSURE: 143 MMHG | HEIGHT: 61 IN

## 2025-04-29 DIAGNOSIS — K80.20 GALLSTONES: ICD-10-CM

## 2025-04-29 PROCEDURE — 3077F SYST BP >= 140 MM HG: CPT | Mod: CPTII,S$GLB,, | Performed by: SURGERY

## 2025-04-29 PROCEDURE — 99203 OFFICE O/P NEW LOW 30 MIN: CPT | Mod: S$GLB,,, | Performed by: SURGERY

## 2025-04-29 PROCEDURE — 3008F BODY MASS INDEX DOCD: CPT | Mod: CPTII,S$GLB,, | Performed by: SURGERY

## 2025-04-29 PROCEDURE — 3079F DIAST BP 80-89 MM HG: CPT | Mod: CPTII,S$GLB,, | Performed by: SURGERY

## 2025-04-29 PROCEDURE — 3044F HG A1C LEVEL LT 7.0%: CPT | Mod: CPTII,S$GLB,, | Performed by: SURGERY

## 2025-04-29 PROCEDURE — 99999 PR PBB SHADOW E&M-EST. PATIENT-LVL IV: CPT | Mod: PBBFAC,,, | Performed by: SURGERY

## 2025-04-29 NOTE — PROGRESS NOTES
Acute Care Surgery: History & Physical    Chief Complaint: R. Sided abdominal pain a/w n/v that has resolved.    Subjective:  Ms. Aldana is a 34 y.o. female who presents after presenting to ED for R. Sided abdominal pain a/w n/v. She states that the day of the ED visit she had eaten some tuna sushi and about 6 hours later began to experience crampy abdomimal and vomiting. She presented to both urgent care that diagnosed gasteroententeritis and then [facility] ED which performed u/s revealing incidentally found gallstones. She was then referred to this clinic to establish care and discuss possible cholecystectomy. Notably she also has a history of IBS.    On examination, Ms. Aldana is sitting comfortably in the room upon entering in no acute distress and in good spirits. She denies headaches, malaise, shortness of breath, chest pain, or nausea, vomiting, fever, chills, or nightsweats. She denies any abdominal pain, and the abdomen is soft, nontender, and nondistended on palpation.     PMHx, SHx, FHx, SocHx, Allergies, Medications:  Ms. Aldana  has a past medical history of ADD (attention deficit disorder), Anxiety, and History of panic attacks.     She  has a past surgical history that includes Tonsillectomy (); [place] tooth extraction (); LASIK (Bilateral, );  section (N/A, 2021); Colonoscopy (N/A, 2023); Esophagogastroduodenoscopy (N/A, 2023); and  section (N/A, 2024).    She  reports that she has never smoked. She has never been exposed to tobacco smoke. She has never used smokeless tobacco. She reports current alcohol use. She reports that she does not use drugs.     Ms. Aldana's family history includes Cancer in her paternal grandfather; Colon cancer in her paternal grandfather; Colon polyps in her father; Hyperlipidemia in her mother; Schizophrenia in her mother; Skin cancer in her mother.  She is allergic to bactrim [sulfamethoxazole-trimethoprim].    Ms.  "Katya has a current medication list which includes the following prescription(s): acyclovir 5%, cetirizine, dextroamphetamine-amphetamine, lorazepam, sertraline, tretinoin, valacyclovir, dicyclomine, hyoscyamine, and ondansetron.    Review of Systems   Constitutional:  Negative for chills and fever.   Respiratory:  Negative for cough and shortness of breath.    Cardiovascular:  Negative for chest pain and palpitations.   Gastrointestinal:  Positive for abdominal pain, nausea and vomiting.   Genitourinary:  Negative for dysuria and hematuria.   Musculoskeletal:  Negative for back pain and gait problem.   Skin:  Negative for color change, rash and wound.   Neurological:  Negative for weakness and headaches.   Hematological:  Negative for adenopathy. Does not bruise/bleed easily.   Psychiatric/Behavioral:  Negative for agitation and confusion.          Objective:  BP (!) 143/85 (BP Location: Left arm, Patient Position: Sitting)   Pulse 85   Ht 5' 1" (1.549 m)   Wt 63.6 kg (140 lb 2.7 oz)   LMP 03/30/2025 (Exact Date)   SpO2 99%   BMI 26.48 kg/m²     Physical Exam  Vitals reviewed.   Constitutional:       General: She is not in acute distress.     Appearance: Normal appearance.   HENT:      Head: Normocephalic and atraumatic.   Eyes:      General: No scleral icterus.     Extraocular Movements: Extraocular movements intact.      Conjunctiva/sclera: Conjunctivae normal.   Neck:      Trachea: No tracheostomy.   Cardiovascular:      Rate and Rhythm: Normal rate and regular rhythm.   Pulmonary:      Effort: Pulmonary effort is normal.   Abdominal:      General: Abdomen is flat. There is no distension.      Palpations: Abdomen is soft.      Tenderness: There is no abdominal tenderness.   Musculoskeletal:         General: No swelling, deformity or signs of injury.      Cervical back: Normal range of motion. No edema or erythema.   Skin:     General: Skin is warm and dry.      Coloration: Skin is not jaundiced. "   Neurological:      General: No focal deficit present.      Mental Status: She is alert and oriented to person, place, and time.   Psychiatric:         Mood and Affect: Mood normal.         Behavior: Behavior normal.           Medical Decision Making:  Data reviewed during evaluation of Ms. Aldana includes lab results, personal interpretation of imaging results, and other practitioner's notes and charts prior to this encounter.     Assessment:   Ms. Aldana is a 34 y.o. female who is presenting after referral from ED for cholelithiasis. Patient's symptoms have completely resolved and are more c/w her IBS and possible gastroenteritis.    She is not interested in undergoing cholecystectomy if she does not have to. Given symptomology, unlikely gallbladder etiology for her pain. Instructed patient on signs of biliary colic /cholecystitis as reasons to return to ED or schedule clinic appointment. All of patient's questions were answered.    Plan:  - RTC CHRIS Vasquez MD  Acute Care Surgery  [facility] Department of Surgery

## 2025-05-01 ENCOUNTER — PATIENT MESSAGE (OUTPATIENT)
Dept: OBSTETRICS AND GYNECOLOGY | Facility: CLINIC | Age: 35
End: 2025-05-01
Payer: COMMERCIAL

## 2025-05-02 ENCOUNTER — OFFICE VISIT (OUTPATIENT)
Dept: OBSTETRICS AND GYNECOLOGY | Facility: CLINIC | Age: 35
End: 2025-05-02
Attending: OBSTETRICS & GYNECOLOGY
Payer: COMMERCIAL

## 2025-05-02 VITALS
DIASTOLIC BLOOD PRESSURE: 72 MMHG | HEIGHT: 61 IN | BODY MASS INDEX: 26.06 KG/M2 | WEIGHT: 138 LBS | SYSTOLIC BLOOD PRESSURE: 114 MMHG

## 2025-05-02 DIAGNOSIS — N83.201 RIGHT OVARIAN CYST: Primary | ICD-10-CM

## 2025-05-02 DIAGNOSIS — Z30.011 ENCOUNTER FOR INITIAL PRESCRIPTION OF CONTRACEPTIVE PILLS: ICD-10-CM

## 2025-05-02 PROCEDURE — 99999 PR PBB SHADOW E&M-EST. PATIENT-LVL III: CPT | Mod: PBBFAC,,, | Performed by: OBSTETRICS & GYNECOLOGY

## 2025-05-02 RX ORDER — DROSPIRENONE AND ETHINYL ESTRADIOL 0.02-3(28)
1 KIT ORAL DAILY
Qty: 84 TABLET | Refills: 3 | Status: SHIPPED | OUTPATIENT
Start: 2025-05-02 | End: 2026-05-02

## 2025-05-02 NOTE — PROGRESS NOTES
"Chief Complaint   Patient presents with    Ovarian Cyst       HPI:  Tamara Aldana is a 34 y.o. female patient  who presents today to discuss an ovarian cyst as well as contraceptive options.  S/P a repeat C/S on 24.  For the past 1-2 months, she describes having intermittent right lower quadrant discomfort / "pulling pain".  Dr. Salvador ordered a pelvic ultrasound which revealed a 4.6 cm complex hemorrhagic appearing right ovarian cyst.  She would like to discuss management options for this cyst.  After delivery, she had been on Slynd progesterone only contraception but has been off of this pill for about 2 months.  She is no longer nursing and would like to return to a combination OCP.      Blood pressure 114/72, height 5' 1" (1.549 m), weight 62.6 kg (138 lb 0.1 oz), last menstrual period 2025, not currently breastfeeding.    23 Pap: Negative, HPV: Negative     25 Pelvic sono:  FINDINGS:  Uterus:  Size: 9.8 x 4.8 x 5.8 cm  Masses: None.  Small cystic space at the site of the  scar.  Endometrium: Normal in this pre menopausal patient, measuring 6 mm.  Probable arcuate uterus.  Right ovary:  Size: 5.3 x 4.6 x 4.5 cm  Appearance: Complex cyst 4.6 cm.  Vascular flow: Normal.  Left ovary:  Size: 1.3 x 1.6 x 1.7 cm  Appearance: Normal  Vascular Flow: Normal.  Free Fluid:  Trace physiologic  Impression:  4.6 cm hemorrhagic cyst the right ovary with trace free fluid.  Findings are almost certainly physiologic in a premenopausal patient.  If there is continued concern, 8-10 week follow up may be obtained to ensure resolution.      Past Medical History:   Diagnosis Date    ADD (attention deficit disorder)     Anxiety     History of panic attacks        Past Surgical History:   Procedure Laterality Date     SECTION N/A 2021    Procedure:  SECTION;  Surgeon: Julie R. Jeansonne, MD;  [facility];  Service: OB/GYN;  Laterality: N/A;     SECTION " N/A 2024    Procedure:  SECTION;  Surgeon: Megan Coronado MD;  Location: [facility] L&D;  Service: OB/GYN;  Laterality: N/A;    COLONOSCOPY N/A 2023    Procedure: COLONOSCOPY;  Surgeon: Brayan Ledezma MD;  Location: [facility] ENDO;  Service: Endoscopy;  Laterality: N/A;    ESOPHAGOGASTRODUODENOSCOPY N/A 2023    Procedure: EGD (ESOPHAGOGASTRODUODENOSCOPY);  Surgeon: Brayan Ledezma MD;  Location: [facility] ENDO;  Service: Endoscopy;  Laterality: N/A;    LASIK Bilateral     TONSILLECTOMY      WISDOM TOOTH EXTRACTION             Diagnosis:  1. Right ovarian cyst    2. Encounter for initial prescription of contraceptive pills          PLAN:    Orders Placed This Encounter    US Pelvis Comp with Transvag NON-OB (xpd    drospirenone-ethinyl estradioL (KENOYN, 28,) 3-0.02 mg per tablet       Patient was counseled today on her pelvic ultrasound findings and the hemorrhagic appearing right ovarian cyst in detail.  We discussed this cyst as a probable functional cyst which should resolve with time.  She will have a follow up ultrasound performed in about 6 weeks to confirm resolution.  She understands the need to contact us for worsening pelvic discomfort.  We also reviewed contraceptive options and she desires a combination OCP.  We discussed Kenyon: risks, benefits, correct usage.    Follow-up after ultrasound    I spent a total of 28 minutes on the day of the visit.This includes face to face time and non-face to face time preparing to see the patient (eg, review of tests), Obtaining and/or reviewing separately obtained history, Documenting clinical information in the electronic or other health record, Independently interpreting resultsand communicating results to the patient/family/caregiver, or Care coordination.    This note was created with voice recognition software.  Grammatical, syntax and spelling errors may be inevitable.

## 2025-05-13 ENCOUNTER — PATIENT MESSAGE (OUTPATIENT)
Dept: PRIMARY CARE CLINIC | Facility: CLINIC | Age: 35
End: 2025-05-13
Payer: COMMERCIAL

## 2025-05-22 ENCOUNTER — OFFICE VISIT (OUTPATIENT)
Dept: DERMATOLOGY | Facility: CLINIC | Age: 35
End: 2025-05-22
Payer: COMMERCIAL

## 2025-05-22 DIAGNOSIS — D22.4 MELANOCYTIC NEVUS OF SCALP: Primary | ICD-10-CM

## 2025-05-22 PROCEDURE — 3044F HG A1C LEVEL LT 7.0%: CPT | Mod: CPTII,S$GLB,, | Performed by: DERMATOLOGY

## 2025-05-22 PROCEDURE — 99212 OFFICE O/P EST SF 10 MIN: CPT | Mod: S$GLB,,, | Performed by: DERMATOLOGY

## 2025-05-22 PROCEDURE — 1159F MED LIST DOCD IN RCRD: CPT | Mod: CPTII,S$GLB,, | Performed by: DERMATOLOGY

## 2025-05-22 PROCEDURE — 1160F RVW MEDS BY RX/DR IN RCRD: CPT | Mod: CPTII,S$GLB,, | Performed by: DERMATOLOGY

## 2025-05-22 NOTE — PROGRESS NOTES
Patient Information  Name: Tamara Aldana  : 1990  MRN: [id number]     Referring Physician:  No ref. provider found   Primary Care Physician:  Wang Salvador MD   Date of Visit: 25      Subjective:     History of Present lllness:    Tamara Aldana is a 35 y.o. female who presents with a chief complaint of mole.  Location: scalp  Signs/Symptoms: does not feel bigger  Current treatment: here today for recheck    Patient was last seen: 2025.  Prior notes by myself reviewed.   Clinical documentation obtained by nursing staff reviewed.    Review of Systems    Objective:   Physical Exam   Skin:   Areas Examined (abnormalities noted in diagram):   Scalp / Hair Palpated and Inspected            Diagram Legend     Erythematous scaling macule/papule c/w actinic keratosis       Vascular papule c/w angioma      Pigmented verrucoid papule/plaque c/w seborrheic keratosis      Yellow umbilicated papule c/w sebaceous hyperplasia      Irregularly shaped tan macule c/w lentigo     1-2 mm smooth white papules consistent with Milia      Movable subcutaneous cyst with punctum c/w epidermal inclusion cyst      Subcutaneous movable cyst c/w pilar cyst      Firm pink to brown papule c/w dermatofibroma      Pedunculated fleshy papule(s) c/w skin tag(s)      Evenly pigmented macule c/w junctional nevus     Mildly variegated pigmented, slightly irregular-bordered macule c/w mildly atypical nevus      Flesh colored to evenly pigmented papule c/w intradermal nevus       Pink pearly papule/plaque c/w basal cell carcinoma      Erythematous hyperkeratotic cursted plaque c/w SCC      Surgical scar with no sign of skin cancer recurrence      Open and closed comedones      Inflammatory papules and pustules      Verrucoid papule consistent consistent with wart     Erythematous eczematous patches and plaques     Dystrophic onycholytic nail with subungual debris c/w onychomycosis     Umbilicated papule     Erythematous-base heme-crusted tan verrucoid plaque consistent with inflamed seborrheic keratosis     Erythematous Silvery Scaling Plaque c/w Psoriasis     See annotation    No images are attached to the encounter or orders placed in the encounter.      [] Data reviewed  [] Prior external notes reviewed  [] Independent review of test  [] Management discussed with another provider  [] Independent historian    Assessment / Plan:        Melanocytic nevus of scalp  Benign-appearing nevus on exam today. No change from previous photo.  Counseled patient to periodically examine moles and return to clinic if any changes in size, shape, or color are noted or if it becomes symptomatic (bleeding, itching, pain, etc).         Follow up in about 9 months (around 2/22/2026) for TBSE, or sooner if any new problems or changing lesions.      Zohreh Nichole MD, FAAD  [facility]

## 2025-06-05 ENCOUNTER — TELEPHONE (OUTPATIENT)
Dept: OBSTETRICS AND GYNECOLOGY | Facility: CLINIC | Age: 35
End: 2025-06-05
Payer: COMMERCIAL

## 2025-06-05 ENCOUNTER — PATIENT MESSAGE (OUTPATIENT)
Dept: OBSTETRICS AND GYNECOLOGY | Facility: CLINIC | Age: 35
End: 2025-06-05
Payer: COMMERCIAL

## 2025-06-05 DIAGNOSIS — M62.838 MUSCLE SPASM: Primary | ICD-10-CM

## 2025-06-05 DIAGNOSIS — Z30.41 ENCOUNTER FOR SURVEILLANCE OF CONTRACEPTIVE PILLS: ICD-10-CM

## 2025-06-06 ENCOUNTER — LAB VISIT (OUTPATIENT)
Dept: LAB | Facility: HOSPITAL | Age: 35
End: 2025-06-06
Payer: COMMERCIAL

## 2025-06-06 ENCOUNTER — OFFICE VISIT (OUTPATIENT)
Dept: OBSTETRICS AND GYNECOLOGY | Facility: CLINIC | Age: 35
End: 2025-06-06
Attending: OBSTETRICS & GYNECOLOGY
Payer: COMMERCIAL

## 2025-06-06 DIAGNOSIS — Z30.41 ENCOUNTER FOR SURVEILLANCE OF CONTRACEPTIVE PILLS: Primary | ICD-10-CM

## 2025-06-06 DIAGNOSIS — M62.838 MUSCLE SPASM: ICD-10-CM

## 2025-06-06 DIAGNOSIS — Z30.41 ENCOUNTER FOR SURVEILLANCE OF CONTRACEPTIVE PILLS: ICD-10-CM

## 2025-06-06 LAB
ALBUMIN SERPL BCP-MCNC: 4.4 G/DL (ref 3.5–5.2)
ALP SERPL-CCNC: 46 UNIT/L (ref 40–150)
ALT SERPL W/O P-5'-P-CCNC: 23 UNIT/L (ref 10–44)
ANION GAP (OHS): 8 MMOL/L (ref 8–16)
AST SERPL-CCNC: 15 UNIT/L (ref 11–45)
BILIRUB SERPL-MCNC: 0.5 MG/DL (ref 0.1–1)
BUN SERPL-MCNC: 10 MG/DL (ref 6–20)
CALCIUM SERPL-MCNC: 9.1 MG/DL (ref 8.7–10.5)
CHLORIDE SERPL-SCNC: 106 MMOL/L (ref 95–110)
CO2 SERPL-SCNC: 25 MMOL/L (ref 23–29)
CREAT SERPL-MCNC: 0.8 MG/DL (ref 0.5–1.4)
GFR SERPLBLD CREATININE-BSD FMLA CKD-EPI: >60 ML/MIN/1.73/M2
GLUCOSE SERPL-MCNC: 90 MG/DL (ref 70–110)
POTASSIUM SERPL-SCNC: 3.6 MMOL/L (ref 3.5–5.1)
PROT SERPL-MCNC: 7.2 GM/DL (ref 6–8.4)
SODIUM SERPL-SCNC: 139 MMOL/L (ref 136–145)

## 2025-06-06 PROCEDURE — 36415 COLL VENOUS BLD VENIPUNCTURE: CPT

## 2025-06-06 PROCEDURE — 82374 ASSAY BLOOD CARBON DIOXIDE: CPT

## 2025-06-09 ENCOUNTER — PATIENT MESSAGE (OUTPATIENT)
Dept: OBSTETRICS AND GYNECOLOGY | Facility: CLINIC | Age: 35
End: 2025-06-09
Payer: COMMERCIAL

## 2025-06-09 DIAGNOSIS — L50.9 URTICARIA: Primary | ICD-10-CM

## 2025-06-10 ENCOUNTER — LAB VISIT (OUTPATIENT)
Dept: LAB | Facility: HOSPITAL | Age: 35
End: 2025-06-10
Attending: INTERNAL MEDICINE
Payer: COMMERCIAL

## 2025-06-10 DIAGNOSIS — L50.9 URTICARIA: ICD-10-CM

## 2025-06-10 LAB
ABSOLUTE EOSINOPHIL (OHS): 0.27 K/UL
ABSOLUTE MONOCYTE (OHS): 0.53 K/UL (ref 0.3–1)
ABSOLUTE NEUTROPHIL COUNT (OHS): 3.46 K/UL (ref 1.8–7.7)
BASOPHILS # BLD AUTO: 0.07 K/UL
BASOPHILS NFR BLD AUTO: 1 %
C4 COMPLEMENT (OHS): 46 MG/DL (ref 11–44)
ERYTHROCYTE [DISTWIDTH] IN BLOOD BY AUTOMATED COUNT: 13.3 % (ref 11.5–14.5)
HCT VFR BLD AUTO: 43.3 % (ref 37–48.5)
HGB BLD-MCNC: 14 GM/DL (ref 12–16)
IGE SERPL-ACNC: <21 IU/ML
IMM GRANULOCYTES # BLD AUTO: 0.02 K/UL (ref 0–0.04)
IMM GRANULOCYTES NFR BLD AUTO: 0.3 % (ref 0–0.5)
LYMPHOCYTES # BLD AUTO: 2.41 K/UL (ref 1–4.8)
MCH RBC QN AUTO: 31.2 PG (ref 27–31)
MCHC RBC AUTO-ENTMCNC: 32.3 G/DL (ref 32–36)
MCV RBC AUTO: 96 FL (ref 82–98)
NUCLEATED RBC (/100WBC) (OHS): 0 /100 WBC
PLATELET # BLD AUTO: 311 K/UL (ref 150–450)
PMV BLD AUTO: 11.3 FL (ref 9.2–12.9)
RBC # BLD AUTO: 4.49 M/UL (ref 4–5.4)
RELATIVE EOSINOPHIL (OHS): 4 %
RELATIVE LYMPHOCYTE (OHS): 35.7 % (ref 18–48)
RELATIVE MONOCYTE (OHS): 7.8 % (ref 4–15)
RELATIVE NEUTROPHIL (OHS): 51.2 % (ref 38–73)
THYROPEROXIDASE IGG SERPL-ACNC: <6 IU/ML
TSH SERPL-ACNC: 1.32 UIU/ML (ref 0.4–4)
WBC # BLD AUTO: 6.76 K/UL (ref 3.9–12.7)

## 2025-06-10 PROCEDURE — 82785 ASSAY OF IGE: CPT

## 2025-06-10 PROCEDURE — 86038 ANTINUCLEAR ANTIBODIES: CPT

## 2025-06-10 PROCEDURE — 86160 COMPLEMENT ANTIGEN: CPT

## 2025-06-10 PROCEDURE — 86332 IMMUNE COMPLEX ASSAY: CPT

## 2025-06-10 PROCEDURE — 36415 COLL VENOUS BLD VENIPUNCTURE: CPT | Mod: PN

## 2025-06-10 PROCEDURE — 86376 MICROSOMAL ANTIBODY EACH: CPT

## 2025-06-10 PROCEDURE — 84443 ASSAY THYROID STIM HORMONE: CPT

## 2025-06-10 PROCEDURE — 85025 COMPLETE CBC W/AUTO DIFF WBC: CPT

## 2025-06-10 PROCEDURE — 83520 IMMUNOASSAY QUANT NOS NONAB: CPT

## 2025-06-10 PROCEDURE — 86162 COMPLEMENT TOTAL (CH50): CPT

## 2025-06-11 ENCOUNTER — HOSPITAL ENCOUNTER (EMERGENCY)
Facility: OTHER | Age: 35
Discharge: HOME | End: 2025-06-11
Payer: COMMERCIAL

## 2025-06-11 ENCOUNTER — NURSE TRIAGE (OUTPATIENT)
Dept: ADMINISTRATIVE | Facility: CLINIC | Age: 35
End: 2025-06-11
Payer: COMMERCIAL

## 2025-06-11 VITALS
BODY MASS INDEX: 25.86 KG/M2 | HEART RATE: 94 BPM | RESPIRATION RATE: 16 BRPM | HEIGHT: 61 IN | DIASTOLIC BLOOD PRESSURE: 87 MMHG | TEMPERATURE: 99 F | SYSTOLIC BLOOD PRESSURE: 135 MMHG | WEIGHT: 137 LBS | OXYGEN SATURATION: 98 %

## 2025-06-11 DIAGNOSIS — R06.02 SOB (SHORTNESS OF BREATH): Primary | ICD-10-CM

## 2025-06-11 DIAGNOSIS — R06.00 DYSPNEA: ICD-10-CM

## 2025-06-11 LAB
ABSOLUTE EOSINOPHIL (OHS): 0.18 K/UL
ABSOLUTE MONOCYTE (OHS): 0.53 K/UL (ref 0.3–1)
ABSOLUTE NEUTROPHIL COUNT (OHS): 3.66 K/UL (ref 1.8–7.7)
ALBUMIN SERPL BCP-MCNC: 4.2 G/DL (ref 3.5–5.2)
ALP SERPL-CCNC: 47 UNIT/L (ref 40–150)
ALT SERPL W/O P-5'-P-CCNC: 21 UNIT/L (ref 10–44)
ANA (OHS): NORMAL
ANION GAP (OHS): 12 MMOL/L (ref 8–16)
AST SERPL-CCNC: 16 UNIT/L (ref 11–45)
BASOPHILS # BLD AUTO: 0.06 K/UL
BASOPHILS NFR BLD AUTO: 0.9 %
BILIRUB SERPL-MCNC: 0.4 MG/DL (ref 0.1–1)
BNP SERPL-MCNC: 15 PG/ML (ref 0–99)
BUN SERPL-MCNC: 8 MG/DL (ref 6–20)
CALCIUM SERPL-MCNC: 9.4 MG/DL (ref 8.7–10.5)
CHLORIDE SERPL-SCNC: 105 MMOL/L (ref 95–110)
CO2 SERPL-SCNC: 20 MMOL/L (ref 23–29)
CREAT SERPL-MCNC: 0.8 MG/DL (ref 0.5–1.4)
D DIMER PPP IA.FEU-MCNC: <0.19 MG/L FEU
ERYTHROCYTE [DISTWIDTH] IN BLOOD BY AUTOMATED COUNT: 13 % (ref 11.5–14.5)
GFR SERPLBLD CREATININE-BSD FMLA CKD-EPI: >60 ML/MIN/1.73/M2
GLUCOSE SERPL-MCNC: 97 MG/DL (ref 70–110)
HCT VFR BLD AUTO: 43.1 % (ref 37–48.5)
HGB BLD-MCNC: 14.2 GM/DL (ref 12–16)
IMM GRANULOCYTES # BLD AUTO: 0.01 K/UL (ref 0–0.04)
IMM GRANULOCYTES NFR BLD AUTO: 0.1 % (ref 0–0.5)
LIPASE SERPL-CCNC: 25 U/L (ref 4–60)
LYMPHOCYTES # BLD AUTO: 2.6 K/UL (ref 1–4.8)
MAGNESIUM SERPL-MCNC: 2.1 MG/DL (ref 1.6–2.6)
MCH RBC QN AUTO: 31 PG (ref 27–31)
MCHC RBC AUTO-ENTMCNC: 32.9 G/DL (ref 32–36)
MCV RBC AUTO: 94 FL (ref 82–98)
NUCLEATED RBC (/100WBC) (OHS): 0 /100 WBC
PLATELET # BLD AUTO: 291 K/UL (ref 150–450)
PMV BLD AUTO: 10.1 FL (ref 9.2–12.9)
POTASSIUM SERPL-SCNC: 3.4 MMOL/L (ref 3.5–5.1)
PROT SERPL-MCNC: 7.5 GM/DL (ref 6–8.4)
RBC # BLD AUTO: 4.58 M/UL (ref 4–5.4)
RELATIVE EOSINOPHIL (OHS): 2.6 %
RELATIVE LYMPHOCYTE (OHS): 36.9 % (ref 18–48)
RELATIVE MONOCYTE (OHS): 7.5 % (ref 4–15)
RELATIVE NEUTROPHIL (OHS): 52 % (ref 38–73)
SODIUM SERPL-SCNC: 137 MMOL/L (ref 136–145)
TROPONIN I SERPL DL<=0.01 NG/ML-MCNC: <0.006 NG/ML
WBC # BLD AUTO: 7.04 K/UL (ref 3.9–12.7)

## 2025-06-11 PROCEDURE — 99284 EMERGENCY DEPT VISIT MOD MDM: CPT | Mod: 25

## 2025-06-11 PROCEDURE — 80053 COMPREHEN METABOLIC PANEL: CPT | Performed by: EMERGENCY MEDICINE

## 2025-06-11 PROCEDURE — 83735 ASSAY OF MAGNESIUM: CPT | Performed by: EMERGENCY MEDICINE

## 2025-06-11 PROCEDURE — 85379 FIBRIN DEGRADATION QUANT: CPT | Performed by: EMERGENCY MEDICINE

## 2025-06-11 PROCEDURE — 93005 ELECTROCARDIOGRAM TRACING: CPT

## 2025-06-11 PROCEDURE — 83690 ASSAY OF LIPASE: CPT | Performed by: EMERGENCY MEDICINE

## 2025-06-11 PROCEDURE — 93010 ELECTROCARDIOGRAM REPORT: CPT | Mod: ,,, | Performed by: INTERNAL MEDICINE

## 2025-06-11 PROCEDURE — 85025 COMPLETE CBC W/AUTO DIFF WBC: CPT | Performed by: EMERGENCY MEDICINE

## 2025-06-11 PROCEDURE — 83880 ASSAY OF NATRIURETIC PEPTIDE: CPT | Performed by: EMERGENCY MEDICINE

## 2025-06-11 PROCEDURE — 84484 ASSAY OF TROPONIN QUANT: CPT | Performed by: EMERGENCY MEDICINE

## 2025-06-11 NOTE — TELEPHONE ENCOUNTER
"Patient states her oral contraceptive, KENYON, was recently discontinued due to c/o SOB. Patient states her symptoms resolved and now SOB has returned. Patient states SOB exists when she is at rest and she is unable to take a "full deep breath".Patient states her OB/Gyn advised to Go to ED for return of SOB.     Care Advice given per Breathing Difficulty - Adult Guideline. Patient advised to Go to ED Now and to have another adult drive her to facility. Patient also advised to contact the [facility] for any worsening symptoms. Patient states understanding of care advice.     Reason for Disposition   [1] MODERATE difficulty breathing (e.g., speaks in phrases, SOB even at rest, pulse 100-120) AND [2] NEW-onset or WORSE than normal    Additional Information   Negative: SEVERE difficulty breathing (e.g., struggling for each breath, speaks in single words)   Negative: [1] Breathing stopped AND [2] hasn't returned   Negative: Choking on something   Negative: Bluish (or gray) lips or face now   Negative: Difficult to awaken or acting confused (e.g., disoriented, slurred speech)   Negative: Passed out (e.g., fainted, lost consciousness, blacked out and was not responding)   Negative: Wheezing started suddenly after medicine, an allergic food or bee sting   Negative: Stridor (harsh sound while breathing in)   Negative: Slow, shallow and weak breathing   Negative: Sounds like a life-threatening emergency to the triager   Negative: Chest pain   Negative: [1] Wheezing (high pitched whistling sound) AND [2] previous asthma attacks or use of asthma medicines   Negative: [1] Breathing difficulty AND [2] COVID-19 is widespread in the community   Negative: [1] Breathing difficulty AND [2] within 14 days of EXPOSURE (close contact) with someone diagnosed with COVID-19 (e.g., COVID test positive)   Negative: Breathing difficulty in a cancer patient who is currently (or recently) receiving chemotherapy or radiation therapy, or " cancer patient who has metastatic or end-stage cancer and is receiving palliative care   Negative: [1] Difficulty breathing AND [2] only present when coughing   Negative: [1] Difficulty breathing AND [2] only from stuffy or runny nose    Protocols used: Breathing Difficulty-A-AH

## 2025-06-11 NOTE — Clinical Note
"Tamara Snyderee" Katya was seen and treated in our emergency department on 6/11/2025.  She may return to work on 06/12/2025.       If you have any questions or concerns, please don't hesitate to call.      Mariana Hui MD"

## 2025-06-12 ENCOUNTER — TELEPHONE (OUTPATIENT)
Dept: OBSTETRICS AND GYNECOLOGY | Facility: CLINIC | Age: 35
End: 2025-06-12
Payer: COMMERCIAL

## 2025-06-12 LAB
OHS QRS DURATION: 86 MS
OHS QTC CALCULATION: 450 MS
TRYPTASE SERPL-MCNC: 4.2 NG/ML

## 2025-06-12 NOTE — ED TRIAGE NOTES
"Pt presents to ED today c/o SOB "gasping"onset x 4 days after starting Stacey OCP   Denies CP referred to ED by OB     "

## 2025-06-12 NOTE — ED NOTES
Tamara Aldana, a 35 y.o. female presents to the ED with c/o chest pain and dyspnea which resolved by the time pt came to ED. Pmh anxiety, pt took home dose of ativan pta. Pt states she was advised to come to ED by on-call nurse.     Pt resting comfortably. Call light within reach. Safety measures in place. Denies further needs. Plan of care ongoing.      Chief Complaint   Patient presents with    Shortness of Breath     SOB x several days. Recently started new birth control, which she has stopped taking due to symptoms. Pt also reporting bilateral cramping. Denies chest pain     Review of patient's allergies indicates:   Allergen Reactions    Bactrim [sulfamethoxazole-trimethoprim] Itching     Past Medical History:   Diagnosis Date    ADD (attention deficit disorder)     Anxiety     History of panic attacks      Past Surgical History:   Procedure Laterality Date     SECTION N/A 2021    Procedure:  SECTION;  Surgeon: Julie R. Jeansonne, MD;  Location: [facility];  Service: OB/GYN;  Laterality: N/A;     SECTION N/A 2024    Procedure:  SECTION;  Surgeon: Megan Coronado MD;  Location: [facility];  Service: OB/GYN;  Laterality: N/A;    COLONOSCOPY N/A 2023    Procedure: COLONOSCOPY;  Surgeon: Brayan Ledezma MD;  Location: [facility];  Service: Endoscopy;  Laterality: N/A;    ESOPHAGOGASTRODUODENOSCOPY N/A 2023    Procedure: EGD (ESOPHAGOGASTRODUODENOSCOPY);  Surgeon: Brayan Ledezma MD;  Location: [facility];  Service: Endoscopy;  Laterality: N/A;    LASIK Bilateral     TONSILLECTOMY      WISDOM TOOTH EXTRACTION

## 2025-06-12 NOTE — ED PROVIDER NOTES
Encounter Date: 2025       History     Chief Complaint   Patient presents with    Shortness of Breath     SOB x several days. Recently started new birth control, which she has stopped taking due to symptoms. Pt also reporting bilateral cramping. Denies chest pain     35-year-old female with a history of anxiety and panic attacks is presenting to the emergency department complaining of difficulty taking a deep breath for the past several days.  Also recently started and then discontinued Stacey birth control recently.  She denies any chest pain, fevers, cough, congestion.  States that she took an Ativan which she takes as needed in states that taking this prior to arrival has provided some relief.  Explained that 2 months ago she stopped taking SSRI due to side effects.  She does follow up with the psychiatrist, was doing [facility] for talk therapy but discontinued this.  She has had some baseline stress including mother of 2 young children.  But no significant or traumatic event happened recently.  She denies any SI or HI.    The history is provided by the patient.     Review of patient's allergies indicates:   Allergen Reactions    Bactrim [sulfamethoxazole-trimethoprim] Itching     Past Medical History:   Diagnosis Date    ADD (attention deficit disorder)     Anxiety     History of panic attacks      Past Surgical History:   Procedure Laterality Date     SECTION N/A 2021    Procedure:  SECTION;  Surgeon: Julie R. Jeansonne, MD;  Location: [facility];  Service: OB/GYN;  Laterality: N/A;     SECTION N/A 2024    Procedure:  SECTION;  Surgeon: Megan Coronado MD;  Location: [facility];  Service: OB/GYN;  Laterality: N/A;    COLONOSCOPY N/A 2023    Procedure: COLONOSCOPY;  Surgeon: Brayan Ledezma MD;  Location: [facility];  Service: Endoscopy;  Laterality: N/A;    ESOPHAGOGASTRODUODENOSCOPY N/A 2023    Procedure: EGD (ESOPHAGOGASTRODUODENOSCOPY);  Surgeon:  Brayan Ledezma MD;  Location: [facility];  Service: Endoscopy;  Laterality: N/A;    LASIK Bilateral 2012    TONSILLECTOMY  2014    WISDOM TOOTH EXTRACTION  2011     Family History   Problem Relation Name Age of Onset    Hyperlipidemia Mother      Schizophrenia Mother      Skin cancer Mother          SCC    Colon polyps Father      Colon cancer Paternal Grandfather      Cancer Paternal Grandfather      Breast cancer Neg Hx      Ovarian cancer Neg Hx      Melanoma Neg Hx      Uterine cancer Neg Hx      Cervical cancer Neg Hx       Social History[1]  Review of Systems    Physical Exam     Initial Vitals [06/11/25 1826]   BP Pulse Resp Temp SpO2   (!) 153/91 (!) 112 19 98 °F (36.7 °C) 100 %      MAP       --         Physical Exam    Nursing note and vitals reviewed.  Constitutional: She is not diaphoretic. No distress.   HENT:   Head: Normocephalic and atraumatic.   Eyes: Conjunctivae are normal.   Cardiovascular:  Normal rate and regular rhythm.           Pulmonary/Chest: No respiratory distress. She has no wheezes. She has no rhonchi. She has no rales.   Abdominal: Abdomen is soft. She exhibits no distension. There is no abdominal tenderness. There is no rebound and no guarding.   Musculoskeletal:         General: No edema. Normal range of motion.     Neurological: She is alert.   Skin: Skin is warm and dry.   Psychiatric: She has a normal mood and affect. Thought content normal.         ED Course   Procedures  Labs Reviewed   COMPREHENSIVE METABOLIC PANEL - Abnormal       Result Value    Sodium 137      Potassium 3.4 (*)     Chloride 105      CO2 20 (*)     Glucose 97      BUN 8      Creatinine 0.8      Calcium 9.4      Protein Total 7.5      Albumin 4.2      Bilirubin Total 0.4      ALP 47      AST 16      ALT 21      Anion Gap 12      eGFR >60     D DIMER, QUANTITATIVE - Normal    D-Dimer <0.19     MAGNESIUM - Normal    Magnesium  2.1     LIPASE - Normal    Lipase Level 25     TROPONIN I - Normal     Troponin-I <0.006     B-TYPE NATRIURETIC PEPTIDE - Normal    BNP 15     CBC WITH DIFFERENTIAL - Normal    WBC 7.04      RBC 4.58      HGB 14.2      HCT 43.1      MCV 94      MCH 31.0      MCHC 32.9      RDW 13.0      Platelet Count 291      MPV 10.1      Nucleated RBC 0      Neut % 52.0      Lymph % 36.9      Mono % 7.5      Eos % 2.6      Basophil % 0.9      Imm Grans % 0.1      Neut # 3.66      Lymph # 2.60      Mono # 0.53      Eos # 0.18      Baso # 0.06      Imm Grans # 0.01     CBC W/ AUTO DIFFERENTIAL    Narrative:     The following orders were created for panel order CBC auto differential.  Procedure                               Abnormality         Status                     ---------                               -----------         ------                     CBC with Differential[[id number]]       Normal              Final result                 Please view results for these tests on the individual orders.   POCT URINE PREGNANCY     EKG Readings: (Independently Interpreted)   Initial Reading: No STEMI. Previous EKG: Compared with most recent EKG Rhythm: Normal Sinus Rhythm. Heart Rate: 93. ST Segments: Normal ST Segments. T Waves: Normal.     ECG Results              EKG 12-lead (Preliminary result)  Result time 06/11/25 19:39:10      Wet Read by Ubaldo Johnson MD (06/11/25 19:39:10, [facility], Emergency Medicine)    My EKG interpretation, sinus rhythm, 93 beats per minute, slight right axis deviation, no ST segment changes, when compared to previous EKG April 21, 2025 relatively unchanged                                  Imaging Results    None          Medications - No data to display  Medical Decision Making  35-year-old female with a history of anxiety and panic attacks is presenting to the emergency department complaining of difficulty taking a deep breath for the past several days.  Also recently started and then discontinued Stacey birth control recently.  She denies any chest  pain, fevers, cough, congestion.  States that she took an Ativan which she takes as needed in states that taking this prior to arrival has provided some relief.  Explained that 2 months ago she stopped taking SSRI due to side effects.  She does follow up with the psychiatrist, was doing [facility] for talk therapy but discontinued this.     Cardiac workup including a D-dimer to evaluate for possible PE was ordered at triage.  Workup reassuring.  Troponin and BNP within normal limits.  No ischemic changes on EKG.  Do suspect that shortness of breath sensation is related to anxiety given her recent discontinuation of maintenance medications.  Only has OR Ativan as needed.  She is having infectious symptoms.  Afebrile and nontoxic appearing.  Satting normally on room air and normal breath sounds bilaterally.  Initially she was tachycardic and mildly hypertensive, this is improved by the time of discharge.  Do suspect that these vital signs were reflective of stress/anxiety upon arrival.    Amount and/or Complexity of Data Reviewed  Labs:  Decision-making details documented in ED Course.               ED Course as of 06/11/25 2104 Wed Jun 11, 2025 2043 Pulse: 94  Heart rate and blood pressure improved by the time of discharge. [KL]   2043 BP: 135/87 [KL]   2101 Lipase: 25  Lipase not 3x upper limit of normal to rasie concern for pancreatitis.  [KL]   2102 Troponin I: <0.006  Troponin within normal limits. No ischemic changes on EKG. Low concern for ACS.  [KL]   2102 Brain natriuretic peptide  BNP normal. No evidence of fluid overload on exam.   [KL]   2102 CBC auto differential  CBC is grossly unremarkable.  No leukocytosis. No anemia.  [KL]   2102 Creatinine: 0.8 [KL]   2102 BUN: 8  Normal kidney function [KL]   2102 D-Dimer: <0.19  D-dimer within normal limits.  Very low suspicion for PE. [KL]   2102 All results were discussed with patient. Strict ED precautions and return instructions were discussed. All  questions were answered. Instructed to follow up with primary care doctor, psychiatrist for re-evaluation. Stable for discharge and outpatient follow up.   [KL]      ED Course User Index  [KL] Mariana Hui MD                           Clinical Impression:  Final diagnoses:  [R06.00] Dyspnea  [R06.02] SOB (shortness of breath) (Primary)          ED Disposition Condition    Discharge Stable          ED Prescriptions    None       Follow-up Information       Follow up With Specialties Details Why Contact Info    [facility] - Emergency Dept Emergency Medicine Go to  As [facility] [id number] [facility] [facility] [id number] [phone]    Wang Salvador MD Internal Medicine Schedule an appointment as soon as possible for a visit in 2 days  [address] [place] [address]  [address]  [place] [address]  [phone]            Launch MDCalc MDM  MDCalc MDM Module  Jun 11 2025 9:03 PM [Mariana Hui]  Data:  - Independent interpretation: I independently reviewed the EKG 12-lead. See MDM section and/or ED Course for my interpretation. [Mariana Hui]  - Test/documents/historian: 3+ tests ordered  1 test reviewed  1 external note reviewed  Problems:  PE, ACS (high)  Additional encounter diagnoses: Dyspnea, SOB (shortness of breath)             [1]   Social History  Tobacco Use    Smoking status: Never     Passive exposure: Never    Smokeless tobacco: Never   Vaping Use    Vaping status: Never Used   Substance Use Topics    Alcohol use: Yes     Comment: rare    Drug use: Never        Mariana Hui MD  06/11/25 1314

## 2025-06-12 NOTE — DISCHARGE INSTRUCTIONS
Diagnosis:  Shortness of breath    Your evaluation today was reassuring that the shortness of breath is unlikely due to something dangerous going on your heart or lungs.  As discussed, may have to do with being taken off of your SSRI recently.  I placed a referral for psychotherapy to see if there is some availability.  Also please follow up with your psychiatrist for further recommendations.     Tests today showed:   Labs Reviewed   COMPREHENSIVE METABOLIC PANEL - Abnormal       Result Value    Sodium 137      Potassium 3.4 (*)     Chloride 105      CO2 20 (*)     Glucose 97      BUN 8      Creatinine 0.8      Calcium 9.4      Protein Total 7.5      Albumin 4.2      Bilirubin Total 0.4      ALP 47      AST 16      ALT 21      Anion Gap 12      eGFR >60     D DIMER, QUANTITATIVE - Normal    D-Dimer <0.19     MAGNESIUM - Normal    Magnesium  2.1     LIPASE - Normal    Lipase Level 25     TROPONIN I - Normal    Troponin-I <0.006     B-TYPE NATRIURETIC PEPTIDE - Normal    BNP 15     CBC WITH DIFFERENTIAL - Normal    WBC 7.04      RBC 4.58      HGB 14.2      HCT 43.1      MCV 94      MCH 31.0      MCHC 32.9      RDW 13.0      Platelet Count 291      MPV 10.1      Nucleated RBC 0      Neut % 52.0      Lymph % 36.9      Mono % 7.5      Eos % 2.6      Basophil % 0.9      Imm Grans % 0.1      Neut # 3.66      Lymph # 2.60      Mono # 0.53      Eos # 0.18      Baso # 0.06      Imm Grans # 0.01     CBC W/ AUTO DIFFERENTIAL    Narrative:     The following orders were created for panel order CBC auto differential.  Procedure                               Abnormality         Status                     ---------                               -----------         ------                     CBC with Differential[[id number]]       Normal              Final result                 Please view results for these tests on the individual orders.   POCT URINE PREGNANCY     No orders to display       Treatments you had today:   Medications -  No data to display    Follow-Up Plan:  - Follow-up with primary care doctor within 3 - 5 days  - Additional testing and/or evaluation as directed by your primary doctor    Return to the Emergency Department for symptoms including but not limited to: worsening symptoms, shortness of breath or chest pain, vomiting with inability to hold down fluids, fevers greater than 100.4°F, passing out/fainting/unconsciousness, or other concerning symptoms.

## 2025-06-13 ENCOUNTER — HOSPITAL ENCOUNTER (OUTPATIENT)
Dept: RADIOLOGY | Facility: OTHER | Age: 35
Discharge: HOME | End: 2025-06-13
Attending: OBSTETRICS & GYNECOLOGY
Payer: COMMERCIAL

## 2025-06-13 DIAGNOSIS — N83.201 RIGHT OVARIAN CYST: ICD-10-CM

## 2025-06-13 LAB — W COMPLEMENT, TOTAL (CH50): 76 U/ML

## 2025-06-13 PROCEDURE — 76856 US EXAM PELVIC COMPLETE: CPT | Mod: 26,,, | Performed by: RADIOLOGY

## 2025-06-13 PROCEDURE — 76856 US EXAM PELVIC COMPLETE: CPT | Mod: TC

## 2025-06-13 PROCEDURE — 76830 TRANSVAGINAL US NON-OB: CPT | Mod: 26,,, | Performed by: RADIOLOGY

## 2025-06-13 NOTE — TELEPHONE ENCOUNTER
Called patient    Evaluation in ER was not consistent with thrombosis.  SOB attributed to anxiety.  She is no longer on Zoloft and plans to follow-up with her psychiatrist.  She plans to stay off of Stacey at this time.  Pelvic sono scheduled for later today.

## 2025-06-14 ENCOUNTER — PATIENT MESSAGE (OUTPATIENT)
Dept: OBSTETRICS AND GYNECOLOGY | Facility: CLINIC | Age: 35
End: 2025-06-14
Payer: COMMERCIAL

## 2025-06-18 LAB — IC SERPL C1Q BIND-ACNC: 1.3 UG EQ/ML (ref 0–3.9)

## 2025-07-15 ENCOUNTER — TELEPHONE (OUTPATIENT)
Dept: OPTOMETRY | Facility: CLINIC | Age: 35
End: 2025-07-15
Payer: COMMERCIAL

## 2025-07-28 ENCOUNTER — OCCUPATIONAL HEALTH (OUTPATIENT)
Dept: URGENT CARE | Facility: CLINIC | Age: 35
End: 2025-07-28

## 2025-07-28 VITALS — HEIGHT: 60 IN | WEIGHT: 134 LBS | BODY MASS INDEX: 26.31 KG/M2

## 2025-07-28 DIAGNOSIS — Z13.9 ENCOUNTER FOR SCREENING: Primary | ICD-10-CM

## 2025-07-28 PROCEDURE — 94010 BREATHING CAPACITY TEST: CPT | Mod: S$GLB,,, | Performed by: PHYSICIAN ASSISTANT

## 2025-07-28 PROCEDURE — 99080 SPECIAL REPORTS OR FORMS: CPT | Mod: S$GLB,,, | Performed by: PHYSICIAN ASSISTANT

## 2025-07-28 PROCEDURE — 99002 DEVICE HANDLING PHYS/QHP: CPT | Mod: S$GLB,,, | Performed by: PHYSICIAN ASSISTANT

## 2025-08-04 PROBLEM — O34.219 PREVIOUS CESAREAN DELIVERY AFFECTING PREGNANCY, ANTEPARTUM: Status: RESOLVED | Noted: 2021-08-03 | Resolved: 2025-08-04

## 2025-08-04 PROBLEM — O09.299 HISTORY OF PRE-ECLAMPSIA IN PRIOR PREGNANCY, CURRENTLY PREGNANT: Status: RESOLVED | Noted: 2024-05-10 | Resolved: 2025-08-04

## 2025-08-20 ENCOUNTER — OFFICE VISIT (OUTPATIENT)
Dept: OPTOMETRY | Facility: CLINIC | Age: 35
End: 2025-08-20
Payer: COMMERCIAL

## 2025-08-20 DIAGNOSIS — H52.223 REGULAR ASTIGMATISM OF BOTH EYES: ICD-10-CM

## 2025-08-20 DIAGNOSIS — Z01.00 EYE EXAM, ROUTINE: Primary | ICD-10-CM

## 2025-08-20 DIAGNOSIS — H16.223 KERATOCONJUNCTIVITIS SICCA, NOT SPECIFIED AS SJÖGREN'S, BILATERAL: ICD-10-CM

## 2025-08-20 PROCEDURE — 99999 PR PBB SHADOW E&M-EST. PATIENT-LVL III: CPT | Mod: PBBFAC,,, | Performed by: OPTOMETRIST

## 2025-08-20 PROCEDURE — 92004 COMPRE OPH EXAM NEW PT 1/>: CPT | Mod: S$GLB,,, | Performed by: OPTOMETRIST

## (undated) DEVICE — HEMOSTAT SURGICEL PWD 3G